# Patient Record
Sex: MALE | Race: WHITE | Employment: OTHER | URBAN - METROPOLITAN AREA
[De-identification: names, ages, dates, MRNs, and addresses within clinical notes are randomized per-mention and may not be internally consistent; named-entity substitution may affect disease eponyms.]

---

## 2017-05-12 PROCEDURE — 84153 ASSAY OF PSA TOTAL: CPT | Performed by: INTERNAL MEDICINE

## 2018-05-15 PROCEDURE — 84153 ASSAY OF PSA TOTAL: CPT | Performed by: INTERNAL MEDICINE

## 2019-10-06 ENCOUNTER — APPOINTMENT (OUTPATIENT)
Dept: GENERAL RADIOLOGY | Facility: HOSPITAL | Age: 84
DRG: 948 | End: 2019-10-06
Attending: EMERGENCY MEDICINE
Payer: MEDICARE

## 2019-10-06 ENCOUNTER — HOSPITAL ENCOUNTER (INPATIENT)
Facility: HOSPITAL | Age: 84
LOS: 3 days | Discharge: NURSING, REHAB OR LONG-TERM CARE | DRG: 948 | End: 2019-10-10
Attending: EMERGENCY MEDICINE | Admitting: INTERNAL MEDICINE
Payer: MEDICARE

## 2019-10-06 DIAGNOSIS — J18.9 COMMUNITY ACQUIRED PNEUMONIA OF LEFT LOWER LOBE OF LUNG: Primary | ICD-10-CM

## 2019-10-06 PROCEDURE — 80053 COMPREHEN METABOLIC PANEL: CPT | Performed by: EMERGENCY MEDICINE

## 2019-10-06 PROCEDURE — 81001 URINALYSIS AUTO W/SCOPE: CPT | Performed by: EMERGENCY MEDICINE

## 2019-10-06 PROCEDURE — 99285 EMERGENCY DEPT VISIT HI MDM: CPT

## 2019-10-06 PROCEDURE — 71045 X-RAY EXAM CHEST 1 VIEW: CPT | Performed by: EMERGENCY MEDICINE

## 2019-10-06 PROCEDURE — 87486 CHLMYD PNEUM DNA AMP PROBE: CPT | Performed by: EMERGENCY MEDICINE

## 2019-10-06 PROCEDURE — 84145 PROCALCITONIN (PCT): CPT | Performed by: HOSPITALIST

## 2019-10-06 PROCEDURE — 87633 RESP VIRUS 12-25 TARGETS: CPT | Performed by: EMERGENCY MEDICINE

## 2019-10-06 PROCEDURE — 36415 COLL VENOUS BLD VENIPUNCTURE: CPT

## 2019-10-06 PROCEDURE — 83605 ASSAY OF LACTIC ACID: CPT | Performed by: EMERGENCY MEDICINE

## 2019-10-06 PROCEDURE — 96361 HYDRATE IV INFUSION ADD-ON: CPT

## 2019-10-06 PROCEDURE — 93010 ELECTROCARDIOGRAM REPORT: CPT

## 2019-10-06 PROCEDURE — 85025 COMPLETE CBC W/AUTO DIFF WBC: CPT | Performed by: EMERGENCY MEDICINE

## 2019-10-06 PROCEDURE — 84450 TRANSFERASE (AST) (SGOT): CPT | Performed by: EMERGENCY MEDICINE

## 2019-10-06 PROCEDURE — 87581 M.PNEUMON DNA AMP PROBE: CPT | Performed by: EMERGENCY MEDICINE

## 2019-10-06 PROCEDURE — 87999 UNLISTED MICROBIOLOGY PX: CPT

## 2019-10-06 PROCEDURE — 93005 ELECTROCARDIOGRAM TRACING: CPT

## 2019-10-06 PROCEDURE — 84132 ASSAY OF SERUM POTASSIUM: CPT | Performed by: EMERGENCY MEDICINE

## 2019-10-06 PROCEDURE — 87798 DETECT AGENT NOS DNA AMP: CPT | Performed by: EMERGENCY MEDICINE

## 2019-10-06 PROCEDURE — 82962 GLUCOSE BLOOD TEST: CPT

## 2019-10-06 PROCEDURE — 96365 THER/PROPH/DIAG IV INF INIT: CPT

## 2019-10-06 PROCEDURE — 87040 BLOOD CULTURE FOR BACTERIA: CPT | Performed by: EMERGENCY MEDICINE

## 2019-10-06 RX ORDER — AZITHROMYCIN 250 MG/1
500 TABLET, FILM COATED ORAL ONCE
Status: COMPLETED | OUTPATIENT
Start: 2019-10-06 | End: 2019-10-07

## 2019-10-06 RX ORDER — ACETAMINOPHEN 500 MG
1000 TABLET ORAL ONCE
Status: COMPLETED | OUTPATIENT
Start: 2019-10-06 | End: 2019-10-06

## 2019-10-06 RX ORDER — CLONIDINE HYDROCHLORIDE 0.1 MG/1
0.1 TABLET ORAL ONCE
Status: COMPLETED | OUTPATIENT
Start: 2019-10-06 | End: 2019-10-06

## 2019-10-06 RX ORDER — SODIUM CHLORIDE 9 MG/ML
INJECTION, SOLUTION INTRAVENOUS CONTINUOUS
Status: DISCONTINUED | OUTPATIENT
Start: 2019-10-06 | End: 2019-10-08

## 2019-10-07 ENCOUNTER — APPOINTMENT (OUTPATIENT)
Dept: GENERAL RADIOLOGY | Facility: HOSPITAL | Age: 84
DRG: 948 | End: 2019-10-07
Attending: HOSPITALIST
Payer: MEDICARE

## 2019-10-07 PROCEDURE — 87081 CULTURE SCREEN ONLY: CPT | Performed by: HOSPITALIST

## 2019-10-07 PROCEDURE — 87430 STREP A AG IA: CPT | Performed by: HOSPITALIST

## 2019-10-07 PROCEDURE — 85025 COMPLETE CBC W/AUTO DIFF WBC: CPT | Performed by: INTERNAL MEDICINE

## 2019-10-07 PROCEDURE — 82607 VITAMIN B-12: CPT | Performed by: HOSPITALIST

## 2019-10-07 PROCEDURE — 71046 X-RAY EXAM CHEST 2 VIEWS: CPT | Performed by: HOSPITALIST

## 2019-10-07 PROCEDURE — 80048 BASIC METABOLIC PNL TOTAL CA: CPT | Performed by: INTERNAL MEDICINE

## 2019-10-07 PROCEDURE — 84145 PROCALCITONIN (PCT): CPT | Performed by: HOSPITALIST

## 2019-10-07 PROCEDURE — 84443 ASSAY THYROID STIM HORMONE: CPT | Performed by: HOSPITALIST

## 2019-10-07 PROCEDURE — S0028 INJECTION, FAMOTIDINE, 20 MG: HCPCS | Performed by: HOSPITALIST

## 2019-10-07 PROCEDURE — 84439 ASSAY OF FREE THYROXINE: CPT | Performed by: HOSPITALIST

## 2019-10-07 RX ORDER — ENOXAPARIN SODIUM 100 MG/ML
40 INJECTION SUBCUTANEOUS DAILY
Status: DISCONTINUED | OUTPATIENT
Start: 2019-10-07 | End: 2019-10-10

## 2019-10-07 RX ORDER — ACETAMINOPHEN 325 MG/1
650 TABLET ORAL EVERY 6 HOURS PRN
Status: DISCONTINUED | OUTPATIENT
Start: 2019-10-07 | End: 2019-10-10

## 2019-10-07 RX ORDER — ASPIRIN 81 MG/1
81 TABLET, CHEWABLE ORAL
Status: DISCONTINUED | OUTPATIENT
Start: 2019-10-07 | End: 2019-10-10

## 2019-10-07 RX ORDER — BENZONATATE 100 MG/1
100 CAPSULE ORAL 3 TIMES DAILY PRN
Status: DISCONTINUED | OUTPATIENT
Start: 2019-10-07 | End: 2019-10-10

## 2019-10-07 RX ORDER — MONTELUKAST SODIUM 10 MG/1
10 TABLET ORAL NIGHTLY
Status: DISCONTINUED | OUTPATIENT
Start: 2019-10-07 | End: 2019-10-10

## 2019-10-07 RX ORDER — SODIUM PHOSPHATE, DIBASIC AND SODIUM PHOSPHATE, MONOBASIC 7; 19 G/133ML; G/133ML
1 ENEMA RECTAL ONCE AS NEEDED
Status: DISCONTINUED | OUTPATIENT
Start: 2019-10-07 | End: 2019-10-10

## 2019-10-07 RX ORDER — SODIUM CHLORIDE 9 MG/ML
INJECTION, SOLUTION INTRAVENOUS CONTINUOUS
Status: DISCONTINUED | OUTPATIENT
Start: 2019-10-07 | End: 2019-10-08

## 2019-10-07 RX ORDER — DOCUSATE SODIUM 100 MG/1
100 CAPSULE, LIQUID FILLED ORAL 2 TIMES DAILY
Status: DISCONTINUED | OUTPATIENT
Start: 2019-10-07 | End: 2019-10-10

## 2019-10-07 RX ORDER — METHYLPREDNISOLONE SODIUM SUCCINATE 40 MG/ML
40 INJECTION, POWDER, LYOPHILIZED, FOR SOLUTION INTRAMUSCULAR; INTRAVENOUS ONCE
Status: COMPLETED | OUTPATIENT
Start: 2019-10-07 | End: 2019-10-07

## 2019-10-07 RX ORDER — TERAZOSIN 1 MG/1
1 CAPSULE ORAL NIGHTLY
Status: DISCONTINUED | OUTPATIENT
Start: 2019-10-07 | End: 2019-10-10

## 2019-10-07 RX ORDER — ONDANSETRON 2 MG/ML
4 INJECTION INTRAMUSCULAR; INTRAVENOUS EVERY 6 HOURS PRN
Status: DISCONTINUED | OUTPATIENT
Start: 2019-10-07 | End: 2019-10-10

## 2019-10-07 RX ORDER — HYDROCODONE BITARTRATE AND ACETAMINOPHEN 5; 325 MG/1; MG/1
1 TABLET ORAL EVERY 6 HOURS PRN
Status: DISCONTINUED | OUTPATIENT
Start: 2019-10-07 | End: 2019-10-10

## 2019-10-07 RX ORDER — FAMOTIDINE 10 MG/ML
20 INJECTION, SOLUTION INTRAVENOUS 2 TIMES DAILY
Status: DISCONTINUED | OUTPATIENT
Start: 2019-10-08 | End: 2019-10-10

## 2019-10-07 RX ORDER — BISACODYL 10 MG
10 SUPPOSITORY, RECTAL RECTAL
Status: DISCONTINUED | OUTPATIENT
Start: 2019-10-07 | End: 2019-10-10

## 2019-10-07 RX ORDER — METOCLOPRAMIDE HYDROCHLORIDE 5 MG/ML
10 INJECTION INTRAMUSCULAR; INTRAVENOUS EVERY 8 HOURS PRN
Status: DISCONTINUED | OUTPATIENT
Start: 2019-10-07 | End: 2019-10-10

## 2019-10-07 RX ORDER — FAMOTIDINE 10 MG/ML
20 INJECTION, SOLUTION INTRAVENOUS ONCE
Status: COMPLETED | OUTPATIENT
Start: 2019-10-07 | End: 2019-10-07

## 2019-10-07 RX ORDER — HYDRALAZINE HYDROCHLORIDE 20 MG/ML
10 INJECTION INTRAMUSCULAR; INTRAVENOUS EVERY 6 HOURS PRN
Status: DISCONTINUED | OUTPATIENT
Start: 2019-10-07 | End: 2019-10-10

## 2019-10-07 RX ORDER — POLYETHYLENE GLYCOL 3350 17 G/17G
17 POWDER, FOR SOLUTION ORAL DAILY PRN
Status: DISCONTINUED | OUTPATIENT
Start: 2019-10-07 | End: 2019-10-10

## 2019-10-07 RX ORDER — METHYLPREDNISOLONE SODIUM SUCCINATE 125 MG/2ML
60 INJECTION, POWDER, LYOPHILIZED, FOR SOLUTION INTRAMUSCULAR; INTRAVENOUS EVERY 8 HOURS
Status: COMPLETED | OUTPATIENT
Start: 2019-10-07 | End: 2019-10-08

## 2019-10-07 RX ORDER — CETIRIZINE HYDROCHLORIDE 10 MG/1
10 TABLET ORAL DAILY
Status: DISCONTINUED | OUTPATIENT
Start: 2019-10-07 | End: 2019-10-10

## 2019-10-07 NOTE — PLAN OF CARE
Pt alert & oriented to questioning & conversation, but per family a \"little off\"  Unsteady with ambulation. Hands are shaking, which is new per family. Pt has increasing cough throughout the day. Swollen uvula is improving slightly.  Will continue steroid

## 2019-10-07 NOTE — PROGRESS NOTES
NURSING ADMISSION NOTE      Patient admitted via Cart  Oriented to room. Safety precautions initiated. Bed in low position. Call light in reach. Pt arrived to room 417 from ED. Pt alert and oriented x3. Hypertensive and afebrile.  Denies pain or S

## 2019-10-07 NOTE — H&P
Addendum 2)  CXR PA/Lat with no evidence of consolidation. Small L pleural effusion. WBC unremarkable and no left shift. procalcitonin unremarkable. Rapid strep negative.  D/c abx for now    Reassess in AM      Addendum 1)  Called by RN that pt's janie wi HERNIA SURGERY     • OTHER SURGICAL HISTORY  7/1/11    placement of tumor markers-Dr. Kim Hernandez   • VASECTOMY          ALL:    Contrast Dye [Gadol*         Home Medications:    Outpatient Medications Marked as Taking for the 10/6/19 encounter [facility] non-tender. Bowel sounds normal. . Non distended, no overt hernias   Extremities: Extremities normal, atraumatic, no cyanosis or edema.    Skin: Skin color, texture, turgor normal. No visible rashes     Neurologic:  Psychiatric: No facial droop or dysarthri dilutional on top of AOCD, monitor. HDS    **prostate cancer, allergic rhinitis-stable, no acute issue, f/u outpt    **PPx  -scds, iain    D/w JADEN De Los Santos      previous hospital records reviewed.      Further recommendations pending patient's clinical co

## 2019-10-07 NOTE — CM/SW NOTE
MSW acknowledged order for [place] [id number] evaluation. MSW paged [facility]  P:[phone]  S:[phone] with referral.  They will meet with the patient and family at bedside to explain services, provide choice, and financial disclosure.     Vika Naylor

## 2019-10-07 NOTE — HOME CARE LIAISON
Received referral from [facility]. Met with pt at the bedside. Pt is agreeable to [facility] services at d/c. Brochure and contact info provided. Any pt questions addressed. Will follow.

## 2019-10-07 NOTE — PROGRESS NOTES
10/07/19 1505   Clinical Encounter Type   Visited With Patient and family together  (Patient and family created health care power of  document.)   Routine Visit Introduction   Continue Visiting No   Patient Spiritual Encounters   Spiritual Asses

## 2019-10-07 NOTE — ED PROVIDER NOTES
Patient Seen in: [facility] Emergency Department      History   Patient presents with:  Altered Mental Status (neurologic)    Stated Complaint: per daughter: lethargy, cough, unsteadiness, forgetfulness    HPI    49-year-old male here for cough the [10/06/19 1801]   BP (!) 189/94   Pulse 85   Resp 18   Temp (!) 101.3 °F (38.5 °C)   Temp src Temporal   SpO2 93 %   O2 Device None (Room air)       Current:/62 (BP Location: Right arm)   Pulse 75   Temp 98.7 °F (37.1 °C) (Oral)   Resp 18   Wt 75.1 k the following components:    RBC 3.50 (*)     HGB 11.4 (*)     HCT 34.4 (*)     .0 (*)     All other components within normal limits   LACTIC ACID, PLASMA - Normal   POTASSIUM - Normal   AST (SGOT) - Normal   PROCALCITONIN - Normal    Narrative: Chest Ap Portable  (cpt=71045)    Result Date: 10/6/2019  CONCLUSION:  No acute intrathoracic process is identified. Minimal pleural fluid versus pleural scarring at the left lung base laterally. Otherwise no acute process.     Dictated by: Dianelys Connor,

## 2019-10-08 PROCEDURE — 85014 HEMATOCRIT: CPT | Performed by: HOSPITALIST

## 2019-10-08 PROCEDURE — 97162 PT EVAL MOD COMPLEX 30 MIN: CPT

## 2019-10-08 PROCEDURE — S0028 INJECTION, FAMOTIDINE, 20 MG: HCPCS | Performed by: HOSPITALIST

## 2019-10-08 PROCEDURE — 82747 ASSAY OF FOLIC ACID RBC: CPT | Performed by: HOSPITALIST

## 2019-10-08 PROCEDURE — 97530 THERAPEUTIC ACTIVITIES: CPT

## 2019-10-08 RX ORDER — AMLODIPINE BESYLATE 5 MG/1
5 TABLET ORAL DAILY
Status: DISCONTINUED | OUTPATIENT
Start: 2019-10-08 | End: 2019-10-09

## 2019-10-08 RX ORDER — FLUTICASONE PROPIONATE 50 MCG
1 SPRAY, SUSPENSION (ML) NASAL DAILY
Status: DISCONTINUED | OUTPATIENT
Start: 2019-10-08 | End: 2019-10-10

## 2019-10-08 RX ORDER — PREDNISONE 20 MG/1
20 TABLET ORAL
Status: DISCONTINUED | OUTPATIENT
Start: 2019-10-09 | End: 2019-10-10

## 2019-10-08 NOTE — CM/SW NOTE
PT recommending TOMI. Met w/pt and pt's daughter regarding this. Daughter agreeable but pt still deciding on TOMI. Gave daughter TOMI list to look over. She stated she will tour facilities tomorrow.

## 2019-10-08 NOTE — PROGRESS NOTES
[facility] Hospitalist Progress Note     [facility]      SUBJECTIVE:  No CP, SOB, or N/V.   Strength and confusion improving    OBJECTIVE:  Temp:  [97.5 °F (36.4 °C)-98.7 °F (37.1 °C)] 97.5 °F (36.4 °C)  Pulse:  [75-76] 76  Resp:  [18] 18  BP: (118-170)/(48 cardiac silhouette. MEDIASTINUM:  Normal. PLEURA:  Slight elevation of the left hemidiaphragm especially laterally could represent a small amount of pleural fluid. BONES:  Moderate degenerative changes of the spine.       CONCLUSION:  Small left pleural eff infusion  Intravenous Continuous   Enoxaparin Sodium (LOVENOX) 40 MG/0.4ML injection 40 mg 40 mg Subcutaneous Daily   ondansetron HCl (ZOFRAN) injection 4 mg 4 mg Intravenous Q6H PRN   Metoclopramide HCl (REGLAN) injection 10 mg 10 mg Intravenous Q8H PRN PT/OT    Dispo: admitted for confusion/weakness, plans for TOMI. Discussed with daughter at bedside, will likely be medically ready for d/c tomorrow. Questions/concerns were discussed with patient and/or family by bedside.     [facility]  Internal Med

## 2019-10-08 NOTE — PHYSICAL THERAPY NOTE
PHYSICAL THERAPY EVALUATION - INPATIENT     Room Number: 417/417-A  Evaluation Date: 10/8/2019  Type of Evaluation: Initial  Physician Order: PT Eval and Treat    Presenting Problem: pneumonia  Reason for Therapy: Mobility Dysfunction and Discharge P PAIN ASSESSMENT  Ratin          COGNITION  · Memory:  impaired working memory  · Following Commands:  follows one step commands with repetition  · Safety Judgement:  decreased awareness of need for assistance and decreased awareness of aware of deficits. Delgado balance performed as below. Pt gait trained with RW x50ft with CGA and improved ivette and safety. Pt in agreement with using at this time. Increased time spent discussing dc recs with pt's dtr after session.       Modified Luna Renee comorbidities manifest themselves as functional limitations in independent bed mobility, transfers, and gait.   The patient is below baseline and would benefit from skilled inpatient PT to address the above deficits to assist patient in returning to prior t

## 2019-10-08 NOTE — PLAN OF CARE
Pt A/O x4. VSS. Afebrile. No c/o pain. IVF stopped. Able to wean oxygen to room air, sating at 94%. Ambulatory in halls with PT. PT recommending TOMI. Norvasc started for high BP. Flonase started for post nasal drip. Daughter at bedside.  Pt and family upda

## 2019-10-08 NOTE — PLAN OF CARE
A&O times 3, forgetful at times. Denies any pain. Family at bedside. 0.9 NS infusing. IV steroids continued.   Up with assist.      Problem: PAIN - ADULT  Goal: Verbalizes/displays adequate comfort level or patient's stated pain goal  Description  INTER rate  - No straws  - Encourage small bites of food and small sips of liquid  - Offer pills one at a time, crush or deliver with applesauce as needed  - Discontinue feeding and notify MD (or speech pathologist) if coughing or persistent throat clearing or w

## 2019-10-09 PROCEDURE — 80048 BASIC METABOLIC PNL TOTAL CA: CPT | Performed by: INTERNAL MEDICINE

## 2019-10-09 PROCEDURE — 97535 SELF CARE MNGMENT TRAINING: CPT

## 2019-10-09 PROCEDURE — S0028 INJECTION, FAMOTIDINE, 20 MG: HCPCS | Performed by: HOSPITALIST

## 2019-10-09 PROCEDURE — 97165 OT EVAL LOW COMPLEX 30 MIN: CPT

## 2019-10-09 RX ORDER — AMLODIPINE BESYLATE 5 MG/1
10 TABLET ORAL DAILY
Status: DISCONTINUED | OUTPATIENT
Start: 2019-10-09 | End: 2019-10-10

## 2019-10-09 NOTE — OCCUPATIONAL THERAPY NOTE
OCCUPATIONAL THERAPY QUICK EVALUATION - INPATIENT    Room Number: 417/417-A  Evaluation Date: 10/9/2019     Type of Evaluation: Quick Eval  Presenting Problem: Community acquired Pneumonia    Physician Order: IP Consult to Occupational Therapy  Reason for stronger    OBJECTIVE  Precautions: Bed/chair alarm  Fall Risk: High fall risk    WEIGHT BEARING RESTRICTION  Weight Bearing Restriction: None                PAIN ASSESSMENT  Ratin  Location: n/a       COGNITION  WFL    RANGE OF MOTION AND STRENGTH ASS instructed in LB dressing nino/doffing socks at MOD I and pants simulated MOD Iwhile seated and standing. Pt returned to semi-reclined position in arm bed with all needs met, call light within reach, RN aware of session.      Patient End of Session: In bed; level

## 2019-10-09 NOTE — PROGRESS NOTES
Alert,oriented. No complaint of pain. Assisted to ambulate to bathroom. Resting comfortably at this time.

## 2019-10-09 NOTE — PROGRESS NOTES
[facility] Hospitalist Progress Note     [facility]      SUBJECTIVE:  Feeling better  No acute events    OBJECTIVE:  Temp:  [98.2 °F (36.8 °C)-98.7 °F (37.1 °C)] 98.7 °F (37.1 °C)  Pulse:  [65-78] 78  Resp:  [18] 18  BP: (119-177)/(58-77) 119/58    Intake/ CARDIAC:  Normal size cardiac silhouette. MEDIASTINUM:  Normal. PLEURA:  Slight elevation of the left hemidiaphragm especially laterally could represent a small amount of pleural fluid. BONES:  Moderate degenerative changes of the spine.       CONCLUSION: hydroxide (MILK OF MAGNESIA) 400 MG/5ML suspension 30 mL 30 mL Oral Daily PRN   bisacodyl (DULCOLAX) rectal suppository 10 mg 10 mg Rectal Daily PRN   FLEET ENEMA (FLEET) 7-19 GM/118ML enema 133 mL 1 enema Rectal Once PRN   Enoxaparin Sodium (LOVENOX) 40 M lovenox  Deconditioning prevention: PT/OT    Dispo: d/c to [facility] when bed available    Questions/concerns were discussed with patient and/or family by bedside.     Josephine Roldan  Internal Medicine  [facility]

## 2019-10-09 NOTE — PLAN OF CARE
Pt a/o x4. VSS. No complaints of pain. PO steroids started today. Norvasc dose increased d/t high BP. Family chose [facility] in [place]. Possibility of d/c tomorrow. Family at bedside. Pt and family updated on POC. Will continue to monitor.

## 2019-10-10 ENCOUNTER — EXTERNAL FACILITY (OUTPATIENT)
Dept: FAMILY MEDICINE CLINIC | Facility: CLINIC | Age: 84
End: 2019-10-10

## 2019-10-10 VITALS
BODY MASS INDEX: 33 KG/M2 | RESPIRATION RATE: 18 BRPM | SYSTOLIC BLOOD PRESSURE: 144 MMHG | WEIGHT: 165.63 LBS | DIASTOLIC BLOOD PRESSURE: 74 MMHG | HEART RATE: 59 BPM | OXYGEN SATURATION: 98 % | TEMPERATURE: 98 F

## 2019-10-10 DIAGNOSIS — Z85.46 HISTORY OF PROSTATE CANCER: ICD-10-CM

## 2019-10-10 DIAGNOSIS — R53.1 GENERALIZED WEAKNESS: ICD-10-CM

## 2019-10-10 DIAGNOSIS — I10 ESSENTIAL HYPERTENSION: ICD-10-CM

## 2019-10-10 DIAGNOSIS — R22.1 SWOLLEN UVULA: ICD-10-CM

## 2019-10-10 DIAGNOSIS — D63.8 ANEMIA, CHRONIC DISEASE: ICD-10-CM

## 2019-10-10 DIAGNOSIS — J18.9 COMMUNITY ACQUIRED PNEUMONIA OF LEFT LOWER LOBE OF LUNG: Primary | ICD-10-CM

## 2019-10-10 PROCEDURE — 90471 IMMUNIZATION ADMIN: CPT

## 2019-10-10 PROCEDURE — S0028 INJECTION, FAMOTIDINE, 20 MG: HCPCS | Performed by: HOSPITALIST

## 2019-10-10 PROCEDURE — 80048 BASIC METABOLIC PNL TOTAL CA: CPT | Performed by: INTERNAL MEDICINE

## 2019-10-10 PROCEDURE — 99306 1ST NF CARE HIGH MDM 50: CPT | Performed by: FAMILY MEDICINE

## 2019-10-10 RX ORDER — HYDROCODONE BITARTRATE AND ACETAMINOPHEN 5; 325 MG/1; MG/1
1 TABLET ORAL EVERY 6 HOURS PRN
Qty: 20 TABLET | Refills: 0 | Status: SHIPPED | OUTPATIENT
Start: 2019-10-10 | End: 2020-01-28

## 2019-10-10 RX ORDER — CETIRIZINE HYDROCHLORIDE 10 MG/1
10 TABLET ORAL DAILY
Qty: 30 TABLET | Refills: 0 | Status: SHIPPED | OUTPATIENT
Start: 2019-10-11 | End: 2019-10-25

## 2019-10-10 RX ORDER — FAMOTIDINE 20 MG/1
20 TABLET ORAL 2 TIMES DAILY
Status: DISCONTINUED | OUTPATIENT
Start: 2019-10-10 | End: 2019-10-10

## 2019-10-10 RX ORDER — PREDNISONE 20 MG/1
20 TABLET ORAL
Qty: 2 TABLET | Refills: 0 | Status: SHIPPED | OUTPATIENT
Start: 2019-10-11 | End: 2019-10-13

## 2019-10-10 RX ORDER — AMLODIPINE BESYLATE 10 MG/1
10 TABLET ORAL DAILY
Qty: 30 TABLET | Refills: 0 | Status: SHIPPED | OUTPATIENT
Start: 2019-10-11 | End: 2019-10-25

## 2019-10-10 RX ORDER — FLUTICASONE PROPIONATE 50 MCG
1 SPRAY, SUSPENSION (ML) NASAL DAILY
Qty: 1 BOTTLE | Refills: 0 | Status: SHIPPED | OUTPATIENT
Start: 2019-10-11 | End: 2019-10-25 | Stop reason: ALTCHOICE

## 2019-10-10 NOTE — DISCHARGE SUMMARY
General Medicine Discharge Summary     Patient ID:  Emperatriz Salas  80year old  12/30/1931    Admit date: 10/6/2019    Discharge date and time 10/10/19  Attending Physician: Cornelius Mcqueen DO CONSULT TO SOCIAL WORK  IP CONSULT TO RESPIRATORY CARE  IP CONSULT TO SPIRITUAL CARE  IP CONSULT TO PHYSICAL THERAPY  IP CONSULT TO SOCIAL WORK  IP CONSULT TO OCCUPATIONAL THERAPY    Operative Procedures:        Patient instructions:      Current Discharge

## 2019-10-10 NOTE — PLAN OF CARE
Alert and oriented, follows command with no confusion. Denies any pain. States he is getting stronger. Ambulated to the bathroom several times today with one assist, activity was tolerated well.  Has good appetite, had a BM today, denies any pain with urina Activities of Daily Living  Goal: Achieve highest/safest level of independence in self care  Description  Interventions:  - Assess ability and encourage patient to participate in ADLs to maximize function  - Promote sitting position while performing ADLs s

## 2019-10-10 NOTE — PROGRESS NOTES
Patient was restless this shift,unable to sleep. Assisted to the bathroom several times,Norco 5 mg x1 given,able to sleep. Resting comfortably at this time.

## 2019-10-10 NOTE — PROGRESS NOTES
[facility] Pharmacy Note: Route Optimization for Famotidine (PEPCID)    Patient is currently on Famotidine (PEPCID) 20 mg IV every 12 hours.    The patient meets the criteria to convert to the oral equivalent as established by the IV to Oral conversion protocol ap

## 2019-10-10 NOTE — CM/SW NOTE
10/10/19 1300   Discharge disposition   Expected discharge disposition Skilled Nurs   Name of Facillity/Home Care/Hospice [facility]   Discharge transportation Private car   [place] aware of dc. RN also aware.  Family to transport the pt to [facility].

## 2019-10-13 NOTE — PROGRESS NOTES
Kristina Diver Author: Sondra Zapata MD     1931 MRN [id number]   [facility]  Admission 10/6/19      [facility]        CC --admitted to [facility] History    Tobacco Use      Smoking status: Former Smoker        Types: Pipe        Quit date: 1964        Years since quittin.8      Smokeless tobacco: Never Used    Alcohol use: No    Drug use: No      ALLERGIES:    Contrast Dye [Gadol*        C lesions  HEENT: atraumatic, normocephalic,  Oral cavity, mild swelling of the uvula  EYES: PERRLA, EOMI, conjunctiva are clear  NECK: supple, no adenopathy, no bruits  CHEST: no chest tenderness  LUNGS: clear to auscultation  CARDIO: RRR without murmur  GI

## 2019-10-16 ENCOUNTER — INITIAL APN SNF VISIT (OUTPATIENT)
Dept: INTERNAL MEDICINE CLINIC | Age: 84
End: 2019-10-16

## 2019-10-16 DIAGNOSIS — R52 PAIN: ICD-10-CM

## 2019-10-16 DIAGNOSIS — I10 ESSENTIAL HYPERTENSION: Primary | ICD-10-CM

## 2019-10-16 DIAGNOSIS — D64.9 ACUTE ON CHRONIC ANEMIA: ICD-10-CM

## 2019-10-16 DIAGNOSIS — H61.23 BILATERAL IMPACTED CERUMEN: ICD-10-CM

## 2019-10-16 DIAGNOSIS — R53.1 GENERALIZED WEAKNESS: ICD-10-CM

## 2019-10-16 DIAGNOSIS — C61 PROSTATE CANCER (HCC): ICD-10-CM

## 2019-10-16 DIAGNOSIS — J18.9 COMMUNITY ACQUIRED PNEUMONIA OF LEFT LOWER LOBE OF LUNG: ICD-10-CM

## 2019-10-16 PROCEDURE — 99304 1ST NF CARE SF/LOW MDM 25: CPT | Performed by: NURSE PRACTITIONER

## 2019-10-17 VITALS
TEMPERATURE: 98 F | DIASTOLIC BLOOD PRESSURE: 65 MMHG | RESPIRATION RATE: 17 BRPM | SYSTOLIC BLOOD PRESSURE: 114 MMHG | HEART RATE: 88 BPM

## 2019-10-17 RX ORDER — ACETAMINOPHEN 325 MG/1
325 TABLET ORAL EVERY 6 HOURS PRN
COMMUNITY
End: 2019-10-25

## 2019-10-18 ENCOUNTER — SNF VISIT (OUTPATIENT)
Dept: INTERNAL MEDICINE CLINIC | Age: 84
End: 2019-10-18

## 2019-10-18 DIAGNOSIS — A04.72 C. DIFFICILE DIARRHEA: ICD-10-CM

## 2019-10-18 DIAGNOSIS — J18.9 COMMUNITY ACQUIRED PNEUMONIA OF LEFT LOWER LOBE OF LUNG: ICD-10-CM

## 2019-10-18 DIAGNOSIS — R53.1 GENERALIZED WEAKNESS: ICD-10-CM

## 2019-10-18 DIAGNOSIS — H61.23 BILATERAL IMPACTED CERUMEN: ICD-10-CM

## 2019-10-18 DIAGNOSIS — R05.9 COUGH: Primary | ICD-10-CM

## 2019-10-18 PROCEDURE — 99309 SBSQ NF CARE MODERATE MDM 30: CPT | Performed by: NURSE PRACTITIONER

## 2019-10-18 NOTE — PROGRESS NOTES
Lady Barfield  : 1931  Age 80year old  male patient is admitted to Facility: [facility] [place] [facility].     [address] [place] [address] [address] date:  10/6/19  Discharge date to [facility]:  10/10/19  ELOS:  12-14 day Types: Pipe        Quit date: 1964        Years since quittin.8      Smokeless tobacco: Never Used    Alcohol use: No    Drug use: No      ALLERGIES:    Contrast Dye [Gadol*        CODE STATUS:  Full Code    ADVANCED CARE PLANNING TEAM: None rectal bleeding; no heartburn  :no dysuria, urgency or frequency; no epididymal or testicular pain; no penile discharge; no hernias; no nocturia: no hematuria  MUSCULOSKELETAL:---generalized weakness  NEURO:no sensory or motor complaint, denies seizures, w/Diff:10/15/19  WBC=6.77  RBC=3.67  Hgb=11.8  Hct=36.3  MCV=99.7  MCH=32.4  MCHC=32.5  RDW-CV=13.3  Platelet UU=492  KGW=23.8    Vitamin D=29.3    Magnesium=2.0    CMP:10/15/19  Glucose=82  BUN=17  Cr=0.83  Bilirubin=0.47  Protein=5.5  Albumin=3.1  Sodium

## 2019-10-18 NOTE — CDS QUERY
Uncertain Diagnosis  CLINICAL DOCUMENTATION CLARIFICATION FORM  Dear Doctor Fe Aguilar,   Clinical information (provided below) indicates an unknown status of a documented diagnosis.  For accurate ICD-10-CM code assignment to reflect severity of illness and

## 2019-10-19 VITALS
HEART RATE: 68 BPM | TEMPERATURE: 98 F | SYSTOLIC BLOOD PRESSURE: 135 MMHG | DIASTOLIC BLOOD PRESSURE: 69 MMHG | RESPIRATION RATE: 18 BRPM

## 2019-10-19 RX ORDER — GUAIFENESIN 100 MG/5ML
5 SOLUTION ORAL EVERY 4 HOURS PRN
COMMUNITY
End: 2019-10-25 | Stop reason: ALTCHOICE

## 2019-10-19 RX ORDER — ERGOCALCIFEROL (VITAMIN D2) 1250 MCG
50000 CAPSULE ORAL WEEKLY
COMMUNITY
End: 2019-10-21

## 2019-10-19 NOTE — PROGRESS NOTES
[facility], 12/30/1931, 80year old, male    Chief Complaint:  Patient presents with:   Follow - Up: Positive for C Diff, Cough       Subjective:  79 y/o male [place] with PMHx of HTN, Prostate Cancer, allergic rhinitis was found to have confusion and weakness.   Weakness R/T recent hospitalization/diagnoses/sequelae; will undergo therapies to rehab and improve strength, endurance and independence w/ ADLs  EXTREMITIES/VASCULAR:no cyanosis, clubbing or edema, radial pulses 2+ and dorsalis pedal pulses 2+

## 2019-10-21 ENCOUNTER — SNF DISCHARGE (OUTPATIENT)
Dept: INTERNAL MEDICINE CLINIC | Age: 84
End: 2019-10-21

## 2019-10-21 VITALS
OXYGEN SATURATION: 95 % | RESPIRATION RATE: 16 BRPM | TEMPERATURE: 99 F | SYSTOLIC BLOOD PRESSURE: 161 MMHG | HEART RATE: 87 BPM | DIASTOLIC BLOOD PRESSURE: 74 MMHG

## 2019-10-21 DIAGNOSIS — R05.9 COUGH: Primary | ICD-10-CM

## 2019-10-21 DIAGNOSIS — R53.1 GENERALIZED WEAKNESS: ICD-10-CM

## 2019-10-21 DIAGNOSIS — J18.9 COMMUNITY ACQUIRED PNEUMONIA OF LEFT LOWER LOBE OF LUNG: ICD-10-CM

## 2019-10-21 DIAGNOSIS — A04.72 C. DIFFICILE DIARRHEA: ICD-10-CM

## 2019-10-21 PROCEDURE — 99316 NF DSCHRG MGMT 30 MIN+: CPT | Performed by: NURSE PRACTITIONER

## 2019-10-21 NOTE — PROGRESS NOTES
[facility] Floor, 12/30/1931, 80year old, male is being discharged from Facility: [address]    Date of Admission:10/10/19     Date of Anticipated Discharge:  10/23/19                             Admitting Diagn bruits  BREAST: ---deferred  RESPIRATORY:clear to percussion and auscultation;+wet cough, no fevers, no chills.   CARDIOVASCULAR: S1, S2 normal, RRR; no S3, no S4; , no click, no murmur  ABDOMEN:  normal active BS+, soft, nondistended; no organomegaly, no m reflexes normal, cranial nerves intact II-XII, follows commands  PSYCHIATRIC: alert and oriented x 3; affect appropriate      Skilled Nursing Facility Course:    · Progressing well with PT/OT. · Developed a cough, CXR=negative, no fevers, no chills.   Will qd     Vitamin D Deficiency  -Vitamin D 1000 units qd     Labs  -CXR=Negative  -Per PCP     Follow Up Appointments  -Dr. Corine Clayton.  Nacho Kidney, PCP in 7 to 10 days after discharge from [facility].     Medication Reconciliation Completed:  Yes    Greater than 30 min

## 2019-10-21 NOTE — ADDENDUM NOTE
Addended by: Saqib Philippe on: 10/21/2019 04:08 PM     Modules accepted: Orders Patient presents to ER complaining of abdominal pain X2 days, reports nausea and vomiting, not able to eat, denies urinary symptoms,  no diarrhea, resp even/unlabored, lungs CTAB.

## 2020-01-04 ENCOUNTER — HOSPITAL ENCOUNTER (OUTPATIENT)
Age: 85
Discharge: HOME | End: 2020-01-04
Payer: MEDICARE

## 2020-01-04 VITALS
RESPIRATION RATE: 20 BRPM | OXYGEN SATURATION: 95 % | TEMPERATURE: 98 F | SYSTOLIC BLOOD PRESSURE: 155 MMHG | DIASTOLIC BLOOD PRESSURE: 87 MMHG | HEART RATE: 83 BPM

## 2020-01-04 DIAGNOSIS — H10.33 ACUTE BACTERIAL CONJUNCTIVITIS OF BOTH EYES: Primary | ICD-10-CM

## 2020-01-04 PROCEDURE — 99213 OFFICE O/P EST LOW 20 MIN: CPT

## 2020-01-04 RX ORDER — CIPROFLOXACIN HYDROCHLORIDE 3.5 MG/ML
2 SOLUTION/ DROPS TOPICAL
Qty: 1 BOTTLE | Refills: 1 | Status: SHIPPED | OUTPATIENT
Start: 2020-01-04 | End: 2020-01-06

## 2020-01-04 NOTE — ED PROVIDER NOTES
Patient Seen in: [facility]      History   Patient presents with:  Eye Problem    Stated Complaint: Eye Irritation     HPI    12-year-old male who comes in today with his son complaining of bilateral eye discharge and drainage t Temp 97.8 °F (36.6 °C)   Temp src Temporal   SpO2 95 %   O2 Device None (Room air)       Current:/87   Pulse 83   Temp 97.8 °F (36.6 °C) (Temporal)   Resp 20   SpO2 95%     Right Eye Chart Acuity: 20/50, Corrected  Left Eye Chart Acuity: 20/50, Cor [id number]  CAnca Brooks, [place] [address]  [phone]    Schedule an appointment as soon as possible for a visit in 3 days  If symptoms worsen or have not improved        Medications Prescribed:  Disch

## 2020-02-07 ENCOUNTER — HOSPITAL ENCOUNTER (OUTPATIENT)
Facility: HOSPITAL | Age: 85
Setting detail: OBSERVATION
LOS: 1 days | Discharge: HOME | End: 2020-02-08
Attending: EMERGENCY MEDICINE | Admitting: INTERNAL MEDICINE
Payer: MEDICARE

## 2020-02-07 ENCOUNTER — APPOINTMENT (OUTPATIENT)
Dept: MRI IMAGING | Facility: HOSPITAL | Age: 85
End: 2020-02-07
Attending: HOSPITALIST
Payer: MEDICARE

## 2020-02-07 ENCOUNTER — APPOINTMENT (OUTPATIENT)
Dept: CT IMAGING | Facility: HOSPITAL | Age: 85
End: 2020-02-07
Attending: EMERGENCY MEDICINE
Payer: MEDICARE

## 2020-02-07 DIAGNOSIS — M54.42 ACUTE LEFT-SIDED LOW BACK PAIN WITH BILATERAL SCIATICA: Primary | ICD-10-CM

## 2020-02-07 DIAGNOSIS — M54.41 ACUTE LEFT-SIDED LOW BACK PAIN WITH BILATERAL SCIATICA: Primary | ICD-10-CM

## 2020-02-07 LAB
ANION GAP SERPL CALC-SCNC: 5 MMOL/L (ref 0–18)
BASOPHILS # BLD AUTO: 0.02 X10(3) UL (ref 0–0.2)
BASOPHILS NFR BLD AUTO: 0.3 %
BILIRUB UR QL STRIP.AUTO: NEGATIVE
BUN BLD-MCNC: 34 MG/DL (ref 7–18)
BUN/CREAT SERPL: 45.9 (ref 10–20)
CALCIUM BLD-MCNC: 8.4 MG/DL (ref 8.5–10.1)
CHLORIDE SERPL-SCNC: 110 MMOL/L (ref 98–112)
CLARITY UR REFRACT.AUTO: CLEAR
CO2 SERPL-SCNC: 28 MMOL/L (ref 21–32)
COLOR UR AUTO: YELLOW
CREAT BLD-MCNC: 0.74 MG/DL (ref 0.7–1.3)
DEPRECATED RDW RBC AUTO: 47.9 FL (ref 35.1–46.3)
EOSINOPHIL # BLD AUTO: 0.09 X10(3) UL (ref 0–0.7)
EOSINOPHIL NFR BLD AUTO: 1.3 %
ERYTHROCYTE [DISTWIDTH] IN BLOOD BY AUTOMATED COUNT: 13 % (ref 11–15)
GLUCOSE BLD-MCNC: 107 MG/DL (ref 70–99)
GLUCOSE UR STRIP.AUTO-MCNC: NEGATIVE MG/DL
HCT VFR BLD AUTO: 36.7 % (ref 39–53)
HGB BLD-MCNC: 12 G/DL (ref 13–17.5)
IMM GRANULOCYTES # BLD AUTO: 0.02 X10(3) UL (ref 0–1)
IMM GRANULOCYTES NFR BLD: 0.3 %
KETONES UR STRIP.AUTO-MCNC: NEGATIVE MG/DL
LEUKOCYTE ESTERASE UR QL STRIP.AUTO: NEGATIVE
LYMPHOCYTES # BLD AUTO: 1.44 X10(3) UL (ref 1–4)
LYMPHOCYTES NFR BLD AUTO: 20.4 %
MCH RBC QN AUTO: 32.8 PG (ref 26–34)
MCHC RBC AUTO-ENTMCNC: 32.7 G/DL (ref 31–37)
MCV RBC AUTO: 100.3 FL (ref 80–100)
MONOCYTES # BLD AUTO: 0.8 X10(3) UL (ref 0.1–1)
MONOCYTES NFR BLD AUTO: 11.3 %
NEUTROPHILS # BLD AUTO: 4.69 X10 (3) UL (ref 1.5–7.7)
NEUTROPHILS # BLD AUTO: 4.69 X10(3) UL (ref 1.5–7.7)
NEUTROPHILS NFR BLD AUTO: 66.4 %
NITRITE UR QL STRIP.AUTO: NEGATIVE
OSMOLALITY SERPL CALC.SUM OF ELEC: 304 MOSM/KG (ref 275–295)
PH UR STRIP.AUTO: 6 [PH] (ref 4.5–8)
PLATELET # BLD AUTO: 155 10(3)UL (ref 150–450)
POTASSIUM SERPL-SCNC: 4 MMOL/L (ref 3.5–5.1)
PROT UR STRIP.AUTO-MCNC: NEGATIVE MG/DL
RBC # BLD AUTO: 3.66 X10(6)UL (ref 3.8–5.8)
RBC UR QL AUTO: NEGATIVE
SODIUM SERPL-SCNC: 143 MMOL/L (ref 136–145)
SP GR UR STRIP.AUTO: 1.02 (ref 1–1.03)
UROBILINOGEN UR STRIP.AUTO-MCNC: <2 MG/DL
WBC # BLD AUTO: 7.1 X10(3) UL (ref 4–11)

## 2020-02-07 PROCEDURE — 72131 CT LUMBAR SPINE W/O DYE: CPT | Performed by: EMERGENCY MEDICINE

## 2020-02-07 PROCEDURE — 99285 EMERGENCY DEPT VISIT HI MDM: CPT

## 2020-02-07 PROCEDURE — 85025 COMPLETE CBC W/AUTO DIFF WBC: CPT | Performed by: EMERGENCY MEDICINE

## 2020-02-07 PROCEDURE — 80048 BASIC METABOLIC PNL TOTAL CA: CPT | Performed by: EMERGENCY MEDICINE

## 2020-02-07 PROCEDURE — 81001 URINALYSIS AUTO W/SCOPE: CPT | Performed by: EMERGENCY MEDICINE

## 2020-02-07 PROCEDURE — 96374 THER/PROPH/DIAG INJ IV PUSH: CPT

## 2020-02-07 PROCEDURE — 96375 TX/PRO/DX INJ NEW DRUG ADDON: CPT

## 2020-02-07 PROCEDURE — 72148 MRI LUMBAR SPINE W/O DYE: CPT | Performed by: HOSPITALIST

## 2020-02-07 PROCEDURE — 96372 THER/PROPH/DIAG INJ SC/IM: CPT

## 2020-02-07 RX ORDER — METHYLPREDNISOLONE 4 MG/1
4 TABLET ORAL
Status: DISCONTINUED | OUTPATIENT
Start: 2020-02-08 | End: 2020-02-08

## 2020-02-07 RX ORDER — HYDROCODONE BITARTRATE AND ACETAMINOPHEN 5; 325 MG/1; MG/1
1 TABLET ORAL EVERY 4 HOURS PRN
Status: DISCONTINUED | OUTPATIENT
Start: 2020-02-07 | End: 2020-02-08

## 2020-02-07 RX ORDER — METHYLPREDNISOLONE 4 MG/1
4 TABLET ORAL
Status: COMPLETED | OUTPATIENT
Start: 2020-02-07 | End: 2020-02-07

## 2020-02-07 RX ORDER — ACETAMINOPHEN 325 MG/1
650 TABLET ORAL EVERY 4 HOURS PRN
Status: DISCONTINUED | OUTPATIENT
Start: 2020-02-07 | End: 2020-02-08

## 2020-02-07 RX ORDER — MONTELUKAST SODIUM 10 MG/1
10 TABLET ORAL NIGHTLY
Status: DISCONTINUED | OUTPATIENT
Start: 2020-02-07 | End: 2020-02-08

## 2020-02-07 RX ORDER — LORATADINE 10 MG/1
10 TABLET ORAL DAILY
COMMUNITY

## 2020-02-07 RX ORDER — MORPHINE SULFATE 4 MG/ML
1 INJECTION, SOLUTION INTRAMUSCULAR; INTRAVENOUS EVERY 2 HOUR PRN
Status: DISCONTINUED | OUTPATIENT
Start: 2020-02-07 | End: 2020-02-08

## 2020-02-07 RX ORDER — TERAZOSIN 10 MG/1
10 CAPSULE ORAL NIGHTLY
Status: DISCONTINUED | OUTPATIENT
Start: 2020-02-07 | End: 2020-02-08

## 2020-02-07 RX ORDER — ACETAMINOPHEN 325 MG/1
650 TABLET ORAL EVERY 6 HOURS PRN
Status: DISCONTINUED | OUTPATIENT
Start: 2020-02-07 | End: 2020-02-08

## 2020-02-07 RX ORDER — METHYLPREDNISOLONE 4 MG/1
8 TABLET ORAL
Status: DISCONTINUED | OUTPATIENT
Start: 2020-02-08 | End: 2020-02-08

## 2020-02-07 RX ORDER — MORPHINE SULFATE 4 MG/ML
2 INJECTION, SOLUTION INTRAMUSCULAR; INTRAVENOUS ONCE
Status: COMPLETED | OUTPATIENT
Start: 2020-02-07 | End: 2020-02-07

## 2020-02-07 RX ORDER — DOCUSATE SODIUM 100 MG/1
100 CAPSULE, LIQUID FILLED ORAL 2 TIMES DAILY
Status: DISCONTINUED | OUTPATIENT
Start: 2020-02-07 | End: 2020-02-08

## 2020-02-07 RX ORDER — METHYLPREDNISOLONE 4 MG/1
8 TABLET ORAL
Status: COMPLETED | OUTPATIENT
Start: 2020-02-07 | End: 2020-02-07

## 2020-02-07 RX ORDER — CETIRIZINE HYDROCHLORIDE 10 MG/1
10 TABLET ORAL DAILY
Status: DISCONTINUED | OUTPATIENT
Start: 2020-02-07 | End: 2020-02-08

## 2020-02-07 RX ORDER — ASPIRIN 81 MG/1
81 TABLET ORAL DAILY
COMMUNITY

## 2020-02-07 RX ORDER — ASPIRIN 81 MG/1
81 TABLET ORAL DAILY
Status: DISCONTINUED | OUTPATIENT
Start: 2020-02-07 | End: 2020-02-08

## 2020-02-07 RX ORDER — MORPHINE SULFATE 4 MG/ML
2 INJECTION, SOLUTION INTRAMUSCULAR; INTRAVENOUS EVERY 2 HOUR PRN
Status: DISCONTINUED | OUTPATIENT
Start: 2020-02-07 | End: 2020-02-08

## 2020-02-07 RX ORDER — METHYLPREDNISOLONE 4 MG/1
4 TABLET ORAL 2 TIMES DAILY
Status: DISCONTINUED | OUTPATIENT
Start: 2020-02-11 | End: 2020-02-08

## 2020-02-07 RX ORDER — ONDANSETRON 2 MG/ML
4 INJECTION INTRAMUSCULAR; INTRAVENOUS ONCE
Status: COMPLETED | OUTPATIENT
Start: 2020-02-07 | End: 2020-02-07

## 2020-02-07 RX ORDER — DOXAZOSIN 8 MG/1
8 TABLET ORAL DAILY
COMMUNITY
End: 2021-05-25

## 2020-02-07 RX ORDER — METHYLPREDNISOLONE 4 MG/1
4 TABLET ORAL
Status: DISCONTINUED | OUTPATIENT
Start: 2020-02-10 | End: 2020-02-08

## 2020-02-07 RX ORDER — POLYETHYLENE GLYCOL 3350 17 G/17G
17 POWDER, FOR SOLUTION ORAL DAILY PRN
Status: DISCONTINUED | OUTPATIENT
Start: 2020-02-07 | End: 2020-02-08

## 2020-02-07 RX ORDER — METHYLPREDNISOLONE 4 MG/1
4 TABLET ORAL
Status: DISCONTINUED | OUTPATIENT
Start: 2020-02-12 | End: 2020-02-08

## 2020-02-07 RX ORDER — METHYLPREDNISOLONE 4 MG/1
4 TABLET ORAL
Status: DISCONTINUED | OUTPATIENT
Start: 2020-02-07 | End: 2020-02-08

## 2020-02-07 RX ORDER — MONTELUKAST SODIUM 10 MG/1
10 TABLET ORAL NIGHTLY
COMMUNITY
End: 2021-02-22

## 2020-02-07 RX ORDER — SODIUM PHOSPHATE, DIBASIC AND SODIUM PHOSPHATE, MONOBASIC 7; 19 G/133ML; G/133ML
1 ENEMA RECTAL ONCE AS NEEDED
Status: DISCONTINUED | OUTPATIENT
Start: 2020-02-07 | End: 2020-02-08

## 2020-02-07 RX ORDER — ENOXAPARIN SODIUM 100 MG/ML
40 INJECTION SUBCUTANEOUS DAILY
Status: DISCONTINUED | OUTPATIENT
Start: 2020-02-07 | End: 2020-02-08

## 2020-02-07 RX ORDER — MORPHINE SULFATE 4 MG/ML
4 INJECTION, SOLUTION INTRAMUSCULAR; INTRAVENOUS EVERY 2 HOUR PRN
Status: DISCONTINUED | OUTPATIENT
Start: 2020-02-07 | End: 2020-02-08

## 2020-02-07 RX ORDER — HYDROCODONE BITARTRATE AND ACETAMINOPHEN 5; 325 MG/1; MG/1
2 TABLET ORAL EVERY 4 HOURS PRN
Status: DISCONTINUED | OUTPATIENT
Start: 2020-02-07 | End: 2020-02-08

## 2020-02-07 RX ORDER — METHYLPREDNISOLONE 4 MG/1
4 TABLET ORAL
Status: DISCONTINUED | OUTPATIENT
Start: 2020-02-09 | End: 2020-02-08

## 2020-02-07 RX ORDER — HYDROCODONE BITARTRATE AND ACETAMINOPHEN 5; 325 MG/1; MG/1
1 TABLET ORAL EVERY 6 HOURS PRN
Status: DISCONTINUED | OUTPATIENT
Start: 2020-02-07 | End: 2020-02-07

## 2020-02-07 RX ORDER — BISACODYL 10 MG
10 SUPPOSITORY, RECTAL RECTAL
Status: DISCONTINUED | OUTPATIENT
Start: 2020-02-07 | End: 2020-02-08

## 2020-02-07 RX ORDER — HYDROCODONE BITARTRATE AND ACETAMINOPHEN 5; 325 MG/1; MG/1
2 TABLET ORAL ONCE
Status: COMPLETED | OUTPATIENT
Start: 2020-02-07 | End: 2020-02-07

## 2020-02-07 NOTE — PROGRESS NOTES
Pt arrived to unit. Denies pain at this time, norco last given in ER 0600. Stand-pivot transfer to bed with staff. Per family and patient no falls at home, however patient has been weak on his feet and had a near miss prior to admission.  4/5 strength to BL

## 2020-02-07 NOTE — ED PROVIDER NOTES
Patient Seen in: [facility] Emergency Department      History   Patient presents with:  Back Pain    Stated Complaint: back pain/general weakness    HPI   79 yo male pmh of chronic back pain and arthritis now presents ED with complaints of low back Abdominal:      General: Bowel sounds are normal.      Palpations: Abdomen is soft.    Musculoskeletal:      Comments: No midline CTL spine tenderness palpation step-offs deformities left ischial spine tenderness palpation no deformities no ecchymosis   S 10 image 25. This suggest the possibility of acute or subacute osteophyte fracture. Thus fat infiltration could also be seen in the setting of early inflammatory arthropathy which could be infectious or noninfectious.   Can consider MRI for further assess diagnosis)    Disposition:  Admit  2/7/2020  3:51 am    Follow-up:  No follow-up provider specified.       Medications Prescribed:  Current Discharge Medication List                   Present on Admission  Date Reviewed: 1/28/2020          ICD-10-CM Noted P

## 2020-02-07 NOTE — PLAN OF CARE
Pain controlled with PO norco, continue to assess for adequate pain control throughout shift. PT/OT ordered to evaluate, will most likely see pt tomorrow am. Per MD, it is ok for PT/OT to see patient prior to MRI.   MRI ordered to evaluate spine, will be co

## 2020-02-07 NOTE — CM/SW NOTE
Patient failed inpatient criteria. Second level of review completed and supports observation. UR committee in agreement. Discussed with Dr. Dora Sosa  who approves observation status. MOON given to the patient and order written.

## 2020-02-07 NOTE — H&P
.  CC: Patient presents with:  Back Pain       PCP: Katie Aguirre MD    History of Present Illness: Patient is a 80year old male with PMH sig for h/o chronic back pain with h/o laminectomy in 1968, takes 1 norco a day but moves around independently daily., Disp: , Rfl:   Wound Dressings ([employer] HYDROCOLLOID 2\"X2\" EX), Apply topically every other day., Disp: , Rfl:   aspirin 81 MG Oral Tab EC, Take 81 mg by mouth daily. , Disp: , Rfl:   Doxazosin Mesylate 8 MG Oral Tab, Take 8 mg by mouth daily.  30 Jenny 02/07/20  0328   WBC 7.1   HGB 12.0*   .3*   .0       Recent Labs   Lab 02/07/20 0328      K 4.0      CO2 28.0   BUN 34*   CREATSERUM 0.74   *   CA 8.4*       No results for input(s): ALT, AST, ALB, AMYLASE, LIPASE, LDH in bowel/bladder incontinence, no other neuro sx  - unsure of significance of possible right L1 osteophyte fracture (unclear age)- pt was having discomfort extending towards R hip.  Otherwise does have significant degenerative changes and spinal stenosis diffu

## 2020-02-07 NOTE — CM/SW NOTE
COND 44: Patient failed Inpatient criteria. Second level of review completed and supports Observation. UR committee in agreement. Discussed with Dr Camila Dale approves.

## 2020-02-07 NOTE — PROGRESS NOTES
Patient returned to room for MRI. Results available in Epic, Dr Niyah Nixon updated to review MRI results.

## 2020-02-08 VITALS
OXYGEN SATURATION: 96 % | HEART RATE: 68 BPM | RESPIRATION RATE: 18 BRPM | BODY MASS INDEX: 33 KG/M2 | DIASTOLIC BLOOD PRESSURE: 64 MMHG | SYSTOLIC BLOOD PRESSURE: 140 MMHG | HEIGHT: 60 IN | TEMPERATURE: 98 F

## 2020-02-08 LAB
ANION GAP SERPL CALC-SCNC: 6 MMOL/L (ref 0–18)
BASOPHILS # BLD AUTO: 0.02 X10(3) UL (ref 0–0.2)
BASOPHILS NFR BLD AUTO: 0.3 %
BUN BLD-MCNC: 23 MG/DL (ref 7–18)
BUN/CREAT SERPL: 31.5 (ref 10–20)
CALCIUM BLD-MCNC: 8.1 MG/DL (ref 8.5–10.1)
CHLORIDE SERPL-SCNC: 108 MMOL/L (ref 98–112)
CO2 SERPL-SCNC: 27 MMOL/L (ref 21–32)
CREAT BLD-MCNC: 0.73 MG/DL (ref 0.7–1.3)
DEPRECATED RDW RBC AUTO: 48.4 FL (ref 35.1–46.3)
EOSINOPHIL # BLD AUTO: 0.01 X10(3) UL (ref 0–0.7)
EOSINOPHIL NFR BLD AUTO: 0.1 %
ERYTHROCYTE [DISTWIDTH] IN BLOOD BY AUTOMATED COUNT: 12.9 % (ref 11–15)
GLUCOSE BLD-MCNC: 111 MG/DL (ref 70–99)
HCT VFR BLD AUTO: 36.8 % (ref 39–53)
HGB BLD-MCNC: 11.9 G/DL (ref 13–17.5)
IMM GRANULOCYTES # BLD AUTO: 0.03 X10(3) UL (ref 0–1)
IMM GRANULOCYTES NFR BLD: 0.4 %
LYMPHOCYTES # BLD AUTO: 0.96 X10(3) UL (ref 1–4)
LYMPHOCYTES NFR BLD AUTO: 12 %
MCH RBC QN AUTO: 32.3 PG (ref 26–34)
MCHC RBC AUTO-ENTMCNC: 32.3 G/DL (ref 31–37)
MCV RBC AUTO: 100 FL (ref 80–100)
MONOCYTES # BLD AUTO: 0.57 X10(3) UL (ref 0.1–1)
MONOCYTES NFR BLD AUTO: 7.1 %
NEUTROPHILS # BLD AUTO: 6.4 X10 (3) UL (ref 1.5–7.7)
NEUTROPHILS # BLD AUTO: 6.4 X10(3) UL (ref 1.5–7.7)
NEUTROPHILS NFR BLD AUTO: 80.1 %
OSMOLALITY SERPL CALC.SUM OF ELEC: 296 MOSM/KG (ref 275–295)
PLATELET # BLD AUTO: 171 10(3)UL (ref 150–450)
POTASSIUM SERPL-SCNC: 4.2 MMOL/L (ref 3.5–5.1)
RBC # BLD AUTO: 3.68 X10(6)UL (ref 3.8–5.8)
SODIUM SERPL-SCNC: 141 MMOL/L (ref 136–145)
WBC # BLD AUTO: 8 X10(3) UL (ref 4–11)

## 2020-02-08 PROCEDURE — 97535 SELF CARE MNGMENT TRAINING: CPT

## 2020-02-08 PROCEDURE — 97161 PT EVAL LOW COMPLEX 20 MIN: CPT

## 2020-02-08 PROCEDURE — 96372 THER/PROPH/DIAG INJ SC/IM: CPT

## 2020-02-08 PROCEDURE — 96376 TX/PRO/DX INJ SAME DRUG ADON: CPT

## 2020-02-08 PROCEDURE — 85025 COMPLETE CBC W/AUTO DIFF WBC: CPT | Performed by: INTERNAL MEDICINE

## 2020-02-08 PROCEDURE — 80048 BASIC METABOLIC PNL TOTAL CA: CPT | Performed by: INTERNAL MEDICINE

## 2020-02-08 RX ORDER — METHYLPREDNISOLONE 4 MG/1
TABLET ORAL
Qty: 21 TABLET | Refills: 0 | Status: SHIPPED | OUTPATIENT
Start: 2020-02-08 | End: 2021-04-29 | Stop reason: ALTCHOICE

## 2020-02-08 NOTE — PLAN OF CARE
Discharge paperwork reviewed in detail with son in law and patient, both verbalized understanding. Patient also instructed on how to schedule outpatient physical therapy. Patient awaiting transport.

## 2020-02-08 NOTE — PHYSICAL THERAPY NOTE
PHYSICAL THERAPY QUICK EVALUATION - INPATIENT    Room Number: 1111/1744-P  Evaluation Date: 2/8/2020  Presenting Problem: back pain  Physician Order: PT Eval and Treat    Problem List  Principal Problem:    Acute left-sided low back pain with bilateral s ASSESSMENT  Ratin  Location: (back R hip at end of session)  Management Techniques: Activity promotion; Body mechanics;Breathing techniques;Relaxation;Repositioning   RANGE OF MOTION AND STRENGTH ASSESSMENT  Upper extremity ROM and strength are within f son-in-law verbalized understanding    Patient End of Session: Up in chair;Needs met;Call light within reach;RN aware of session/findings; All patient questions and concerns addressed; Family present    ASSESSMENT   Patient is a 80year old male admitted on

## 2020-02-08 NOTE — PROGRESS NOTES
[facility] Hospitalist Progress Note      Reap Patient Status:  Observation    1931 MRN [id number]   [facility] 0SW-A Attending David Simmons MD   [facility] Day # 1 PCP Nolvia Paz MD     CC: follow up    SUBJECTIVE: 2/10/2020] methylPREDNISolone  4 mg Oral Daily with breakfast   • [START ON 2/10/2020] methylPREDNISolone  4 mg Oral Daily with lunch   • [START ON 2/10/2020] methylPREDNISolone  4 mg Oral nightly   • [START ON 2/11/2020] methylPREDNISolone  4 mg Oral BID care, and/or d/w staff.      Jaki Hanks MD   [facility] IM Hospitalist  Pager: [phone]

## 2021-04-29 PROBLEM — M54.42 ACUTE LEFT-SIDED LOW BACK PAIN WITH BILATERAL SCIATICA: Status: RESOLVED | Noted: 2020-02-07 | Resolved: 2021-04-29

## 2021-04-29 PROBLEM — K13.79 OTHER LESIONS OF ORAL MUCOSA: Status: RESOLVED | Noted: 2019-10-10 | Resolved: 2021-04-29

## 2021-04-29 PROBLEM — K57.90 DIVERTICULOSIS OF INTESTINE, PART UNSPECIFIED, WITHOUT PERFORATION OR ABSCESS WITHOUT BLEEDING: Status: ACTIVE | Noted: 2019-10-10

## 2021-04-29 PROBLEM — M54.41 ACUTE LEFT-SIDED LOW BACK PAIN WITH BILATERAL SCIATICA: Status: RESOLVED | Noted: 2020-02-07 | Resolved: 2021-04-29

## 2021-04-29 PROBLEM — I34.9 NONRHEUMATIC MITRAL VALVE DISORDER, UNSPECIFIED: Status: ACTIVE | Noted: 2019-10-10

## 2021-04-29 PROBLEM — M75.82 OTHER SHOULDER LESIONS, LEFT SHOULDER: Status: ACTIVE | Noted: 2019-10-10

## 2021-04-29 PROBLEM — R48.9 UNSPECIFIED SYMBOLIC DYSFUNCTIONS: Status: RESOLVED | Noted: 2019-10-11 | Resolved: 2021-04-29

## 2021-04-29 PROBLEM — M62.58 MUSCLE WASTING AND ATROPHY, NOT ELSEWHERE CLASSIFIED, OTHER SITE: Status: ACTIVE | Noted: 2019-10-11

## 2021-04-29 PROBLEM — K13.79 OTHER LESIONS OF ORAL MUCOSA: Status: ACTIVE | Noted: 2019-10-10

## 2021-04-29 PROBLEM — R13.11 DYSPHAGIA, ORAL PHASE: Status: ACTIVE | Noted: 2019-10-11

## 2021-04-29 PROBLEM — A04.72 ENTEROCOLITIS DUE TO CLOSTRIDIUM DIFFICILE, NOT SPECIFIED AS RECURRENT: Status: ACTIVE | Noted: 2019-10-17

## 2021-04-29 PROBLEM — J18.9 COMMUNITY ACQUIRED PNEUMONIA OF LEFT LOWER LOBE OF LUNG: Status: RESOLVED | Noted: 2019-10-06 | Resolved: 2021-04-29

## 2021-04-29 PROBLEM — R26.9 UNSPECIFIED ABNORMALITIES OF GAIT AND MOBILITY: Status: ACTIVE | Noted: 2019-10-11

## 2021-04-29 PROBLEM — R48.9 UNSPECIFIED SYMBOLIC DYSFUNCTIONS: Status: ACTIVE | Noted: 2019-10-11

## 2021-04-29 PROBLEM — E66.9 OBESITY, UNSPECIFIED: Status: ACTIVE | Noted: 2019-10-10

## 2021-04-29 PROBLEM — E66.9 OBESITY, UNSPECIFIED: Status: RESOLVED | Noted: 2019-10-10 | Resolved: 2021-04-29

## 2021-09-01 NOTE — ED NOTES
RN report given by Margarette Pierre, patient is sleeping, appears calm, even bilateral non-labored breathing. Additional Notes: Patient consent was obtained to proceed with the visit and recommended plan of care after discussion of all risks and benefits, including the risks of COVID-19 exposure. Detail Level: Simple

## 2021-11-07 ENCOUNTER — HOSPITAL ENCOUNTER (EMERGENCY)
Facility: HOSPITAL | Age: 86
Discharge: HOME | End: 2021-11-07
Attending: EMERGENCY MEDICINE
Payer: MEDICARE

## 2021-11-07 ENCOUNTER — APPOINTMENT (OUTPATIENT)
Dept: GENERAL RADIOLOGY | Facility: HOSPITAL | Age: 86
End: 2021-11-07
Attending: EMERGENCY MEDICINE
Payer: MEDICARE

## 2021-11-07 ENCOUNTER — APPOINTMENT (OUTPATIENT)
Dept: CT IMAGING | Facility: HOSPITAL | Age: 86
End: 2021-11-07
Attending: EMERGENCY MEDICINE
Payer: MEDICARE

## 2021-11-07 VITALS
SYSTOLIC BLOOD PRESSURE: 150 MMHG | TEMPERATURE: 99 F | HEIGHT: 62 IN | BODY MASS INDEX: 29.81 KG/M2 | WEIGHT: 162 LBS | RESPIRATION RATE: 19 BRPM | DIASTOLIC BLOOD PRESSURE: 81 MMHG | OXYGEN SATURATION: 100 % | HEART RATE: 70 BPM

## 2021-11-07 DIAGNOSIS — J18.9 PNEUMONIA OF LEFT LOWER LOBE DUE TO INFECTIOUS ORGANISM: Primary | ICD-10-CM

## 2021-11-07 PROCEDURE — 99285 EMERGENCY DEPT VISIT HI MDM: CPT | Performed by: EMERGENCY MEDICINE

## 2021-11-07 PROCEDURE — 96360 HYDRATION IV INFUSION INIT: CPT | Performed by: EMERGENCY MEDICINE

## 2021-11-07 PROCEDURE — 96361 HYDRATE IV INFUSION ADD-ON: CPT | Performed by: EMERGENCY MEDICINE

## 2021-11-07 PROCEDURE — 71045 X-RAY EXAM CHEST 1 VIEW: CPT | Performed by: EMERGENCY MEDICINE

## 2021-11-07 PROCEDURE — 70450 CT HEAD/BRAIN W/O DYE: CPT | Performed by: EMERGENCY MEDICINE

## 2021-11-07 PROCEDURE — 71275 CT ANGIOGRAPHY CHEST: CPT | Performed by: EMERGENCY MEDICINE

## 2021-11-07 PROCEDURE — 85025 COMPLETE CBC W/AUTO DIFF WBC: CPT | Performed by: EMERGENCY MEDICINE

## 2021-11-07 PROCEDURE — 80053 COMPREHEN METABOLIC PANEL: CPT | Performed by: EMERGENCY MEDICINE

## 2021-11-07 RX ORDER — AZITHROMYCIN 250 MG/1
TABLET, FILM COATED ORAL
Qty: 6 TABLET | Refills: 0 | Status: SHIPPED | OUTPATIENT
Start: 2021-11-07 | End: 2021-11-12

## 2021-11-07 RX ORDER — SODIUM CHLORIDE 9 MG/ML
INJECTION, SOLUTION INTRAVENOUS CONTINUOUS
Status: DISCONTINUED | OUTPATIENT
Start: 2021-11-07 | End: 2021-11-07

## 2021-11-07 RX ORDER — AMOXICILLIN AND CLAVULANATE POTASSIUM 875; 125 MG/1; MG/1
1 TABLET, FILM COATED ORAL 2 TIMES DAILY
Qty: 20 TABLET | Refills: 0 | Status: SHIPPED | OUTPATIENT
Start: 2021-11-07 | End: 2021-11-17

## 2021-11-07 NOTE — ED INITIAL ASSESSMENT (HPI)
Patient presents from EMS for evaluation of several episodes of being difficult to wake up. EMS states daughter called when she tried to wake him up and she was not able to wake him up and reported shallow breathing.  Patient states he fell asleep in the re

## 2021-11-07 NOTE — ED PROVIDER NOTES
Patient Seen in: [facility] Emergency Department      History   Patient presents with:  Altered Mental Status    Stated Complaint: ams, now resolved    Subjective:   HPI    75-year-old male with a history of hypertension, chronic neck/back pain who VASECTOMY                  Social History    Tobacco Use      Smoking status: Former Smoker        Packs/day: 0.00        Types: Pipe        Quit date: 1964        Years since quittin.8      Smokeless tobacco: Never Used    Vaping Use      Vaping Narrative: The following orders were created for panel order CBC With Differential With Platelet.   Procedure                               Abnormality         Status                     ---------                               -----------         ------ 11/07/2021 at 4:54 PM       CT ANGIOGRAPHY, CHEST (CPT=71275)    Result Date: 11/7/2021  PROCEDURE:  CT ANGIOGRAPHY, CHEST (CPT=71275)  COMPARISON:  MARC[facility] CT, CHEST W+W/O CONTRAST, 12/12/2006, 10:02 AM.  INDICATIONS:  ams, now resolved  TECHNIQUE: obtained.   COMPARISON:  [facility] , XR, XR CHEST PA + LAT CHEST (CPT=71046), 10/07/2019, 12:50 PM.  INDICATIONS:  ams, now resolved  PATIENT STATED HISTORY: (As transcribed by Technologist)  Patient offered no additional history at this time    FINDINGS:  Sma days., Normal, Disp-20 tablet, R-0    azithromycin (ZITHROMAX Z-NANCY) 250 MG Oral Tab  500 mg once followed by 250 mg daily x 4 days, Normal, Disp-6 tablet, R-0

## 2022-01-10 ENCOUNTER — HOSPITAL ENCOUNTER (INPATIENT)
Facility: HOSPITAL | Age: 87
LOS: 2 days | Discharge: HOME | DRG: 177 | End: 2022-01-12
Attending: EMERGENCY MEDICINE | Admitting: INTERNAL MEDICINE
Payer: MEDICARE

## 2022-01-10 ENCOUNTER — APPOINTMENT (OUTPATIENT)
Dept: GENERAL RADIOLOGY | Facility: HOSPITAL | Age: 87
DRG: 177 | End: 2022-01-10
Payer: MEDICARE

## 2022-01-10 DIAGNOSIS — U07.1 PNEUMONIA DUE TO COVID-19 VIRUS: Primary | ICD-10-CM

## 2022-01-10 DIAGNOSIS — J12.82 PNEUMONIA DUE TO COVID-19 VIRUS: Primary | ICD-10-CM

## 2022-01-10 PROBLEM — D64.9 ANEMIA: Status: ACTIVE | Noted: 2022-01-10

## 2022-01-10 PROBLEM — R79.89 AZOTEMIA: Status: ACTIVE | Noted: 2022-01-10

## 2022-01-10 PROBLEM — D69.6 THROMBOCYTOPENIA (HCC): Status: ACTIVE | Noted: 2022-01-10

## 2022-01-10 LAB
ALBUMIN SERPL-MCNC: 2.8 G/DL (ref 3.4–5)
ALBUMIN/GLOB SERPL: 0.7 {RATIO} (ref 1–2)
ALP LIVER SERPL-CCNC: 66 U/L
ALT SERPL-CCNC: 15 U/L
ANION GAP SERPL CALC-SCNC: 5 MMOL/L (ref 0–18)
AST SERPL-CCNC: 21 U/L (ref 15–37)
BASOPHILS # BLD AUTO: 0 X10(3) UL (ref 0–0.2)
BASOPHILS NFR BLD AUTO: 0 %
BILIRUB SERPL-MCNC: 0.4 MG/DL (ref 0.1–2)
BUN BLD-MCNC: 24 MG/DL (ref 7–18)
CALCIUM BLD-MCNC: 7.9 MG/DL (ref 8.5–10.1)
CHLORIDE SERPL-SCNC: 109 MMOL/L (ref 98–112)
CK SERPL-CCNC: 199 U/L
CO2 SERPL-SCNC: 27 MMOL/L (ref 21–32)
CREAT BLD-MCNC: 0.88 MG/DL
CRP SERPL-MCNC: 8.54 MG/DL (ref ?–0.3)
D DIMER PPP FEU-MCNC: <0.27 UG/ML FEU (ref ?–0.9)
DEPRECATED HBV CORE AB SER IA-ACNC: 254 NG/ML
EOSINOPHIL # BLD AUTO: 0 X10(3) UL (ref 0–0.7)
EOSINOPHIL NFR BLD AUTO: 0 %
ERYTHROCYTE [DISTWIDTH] IN BLOOD BY AUTOMATED COUNT: 12.8 %
GLOBULIN PLAS-MCNC: 3.8 G/DL (ref 2.8–4.4)
GLUCOSE BLD-MCNC: 98 MG/DL (ref 70–99)
HCT VFR BLD AUTO: 33.8 %
HGB BLD-MCNC: 11.4 G/DL
IMM GRANULOCYTES # BLD AUTO: 0.02 X10(3) UL (ref 0–1)
IMM GRANULOCYTES NFR BLD: 0.3 %
LDH SERPL L TO P-CCNC: 240 U/L
LYMPHOCYTES # BLD AUTO: 0.62 X10(3) UL (ref 1–4)
LYMPHOCYTES NFR BLD AUTO: 10.2 %
MCH RBC QN AUTO: 33.3 PG (ref 26–34)
MCHC RBC AUTO-ENTMCNC: 33.7 G/DL (ref 31–37)
MCV RBC AUTO: 98.8 FL
MONOCYTES # BLD AUTO: 0.55 X10(3) UL (ref 0.1–1)
MONOCYTES NFR BLD AUTO: 9.1 %
NEUTROPHILS # BLD AUTO: 4.87 X10 (3) UL (ref 1.5–7.7)
NEUTROPHILS # BLD AUTO: 4.87 X10(3) UL (ref 1.5–7.7)
NEUTROPHILS NFR BLD AUTO: 80.4 %
NT-PROBNP SERPL-MCNC: 539 PG/ML (ref ?–450)
OSMOLALITY SERPL CALC.SUM OF ELEC: 296 MOSM/KG (ref 275–295)
PLATELET # BLD AUTO: 123 10(3)UL (ref 150–450)
POTASSIUM SERPL-SCNC: 3.6 MMOL/L (ref 3.5–5.1)
PROCALCITONIN SERPL-MCNC: 0.05 NG/ML (ref ?–0.16)
PROT SERPL-MCNC: 6.6 G/DL (ref 6.4–8.2)
RBC # BLD AUTO: 3.42 X10(6)UL
SODIUM SERPL-SCNC: 141 MMOL/L (ref 136–145)
TROPONIN I HIGH SENSITIVITY: 34 NG/L
TROPONIN I HIGH SENSITIVITY: 36 NG/L
TROPONIN I HIGH SENSITIVITY: 37 NG/L
WBC # BLD AUTO: 6.1 X10(3) UL (ref 4–11)

## 2022-01-10 PROCEDURE — 85025 COMPLETE CBC W/AUTO DIFF WBC: CPT | Performed by: EMERGENCY MEDICINE

## 2022-01-10 PROCEDURE — 82728 ASSAY OF FERRITIN: CPT | Performed by: EMERGENCY MEDICINE

## 2022-01-10 PROCEDURE — 87040 BLOOD CULTURE FOR BACTERIA: CPT | Performed by: EMERGENCY MEDICINE

## 2022-01-10 PROCEDURE — 71045 X-RAY EXAM CHEST 1 VIEW: CPT

## 2022-01-10 PROCEDURE — XW033E5 INTRODUCTION OF REMDESIVIR ANTI-INFECTIVE INTO PERIPHERAL VEIN, PERCUTANEOUS APPROACH, NEW TECHNOLOGY GROUP 5: ICD-10-PCS | Performed by: EMERGENCY MEDICINE

## 2022-01-10 PROCEDURE — 36415 COLL VENOUS BLD VENIPUNCTURE: CPT

## 2022-01-10 PROCEDURE — 84484 ASSAY OF TROPONIN QUANT: CPT | Performed by: EMERGENCY MEDICINE

## 2022-01-10 PROCEDURE — 84145 PROCALCITONIN (PCT): CPT | Performed by: EMERGENCY MEDICINE

## 2022-01-10 PROCEDURE — XW033F5 INTRODUCTION OF OTHER NEW TECHNOLOGY THERAPEUTIC SUBSTANCE INTO PERIPHERAL VEIN, PERCUTANEOUS APPROACH, NEW TECHNOLOGY GROUP 5: ICD-10-PCS | Performed by: EMERGENCY MEDICINE

## 2022-01-10 PROCEDURE — 96365 THER/PROPH/DIAG IV INF INIT: CPT

## 2022-01-10 PROCEDURE — 96366 THER/PROPH/DIAG IV INF ADDON: CPT

## 2022-01-10 PROCEDURE — 99285 EMERGENCY DEPT VISIT HI MDM: CPT

## 2022-01-10 PROCEDURE — 83615 LACTATE (LD) (LDH) ENZYME: CPT | Performed by: EMERGENCY MEDICINE

## 2022-01-10 PROCEDURE — 94640 AIRWAY INHALATION TREATMENT: CPT

## 2022-01-10 PROCEDURE — 80053 COMPREHEN METABOLIC PANEL: CPT | Performed by: EMERGENCY MEDICINE

## 2022-01-10 PROCEDURE — 83880 ASSAY OF NATRIURETIC PEPTIDE: CPT | Performed by: EMERGENCY MEDICINE

## 2022-01-10 PROCEDURE — 96375 TX/PRO/DX INJ NEW DRUG ADDON: CPT

## 2022-01-10 PROCEDURE — 86140 C-REACTIVE PROTEIN: CPT | Performed by: EMERGENCY MEDICINE

## 2022-01-10 PROCEDURE — 82550 ASSAY OF CK (CPK): CPT | Performed by: EMERGENCY MEDICINE

## 2022-01-10 PROCEDURE — 85379 FIBRIN DEGRADATION QUANT: CPT | Performed by: EMERGENCY MEDICINE

## 2022-01-10 RX ORDER — TERAZOSIN 10 MG/1
10 CAPSULE ORAL NIGHTLY
Status: DISCONTINUED | OUTPATIENT
Start: 2022-01-10 | End: 2022-01-12

## 2022-01-10 RX ORDER — GUAIFENESIN 600 MG
600 TABLET, EXTENDED RELEASE 12 HR ORAL 2 TIMES DAILY
Status: DISCONTINUED | OUTPATIENT
Start: 2022-01-10 | End: 2022-01-12

## 2022-01-10 RX ORDER — ALBUTEROL SULFATE 90 UG/1
8 AEROSOL, METERED RESPIRATORY (INHALATION) 4 TIMES DAILY
Status: DISCONTINUED | OUTPATIENT
Start: 2022-01-10 | End: 2022-01-12

## 2022-01-10 RX ORDER — ALBUTEROL SULFATE 90 UG/1
4 AEROSOL, METERED RESPIRATORY (INHALATION) EVERY 4 HOURS PRN
Status: DISCONTINUED | OUTPATIENT
Start: 2022-01-10 | End: 2022-01-12

## 2022-01-10 RX ORDER — LISINOPRIL 20 MG/1
20 TABLET ORAL DAILY
Status: DISCONTINUED | OUTPATIENT
Start: 2022-01-10 | End: 2022-01-12

## 2022-01-10 RX ORDER — METOPROLOL SUCCINATE 25 MG/1
25 TABLET, EXTENDED RELEASE ORAL
Status: DISCONTINUED | OUTPATIENT
Start: 2022-01-11 | End: 2022-01-12

## 2022-01-10 RX ORDER — DEXAMETHASONE SODIUM PHOSPHATE 10 MG/ML
6 INJECTION, SOLUTION INTRAMUSCULAR; INTRAVENOUS DAILY
Status: DISCONTINUED | OUTPATIENT
Start: 2022-01-11 | End: 2022-01-10

## 2022-01-10 RX ORDER — ASPIRIN 81 MG/1
81 TABLET ORAL DAILY
Status: DISCONTINUED | OUTPATIENT
Start: 2022-01-10 | End: 2022-01-12

## 2022-01-10 RX ORDER — BENZONATATE 200 MG/1
200 CAPSULE ORAL 3 TIMES DAILY PRN
Status: DISCONTINUED | OUTPATIENT
Start: 2022-01-10 | End: 2022-01-12

## 2022-01-10 RX ORDER — DEXAMETHASONE SODIUM PHOSPHATE 4 MG/ML
6 VIAL (ML) INJECTION EVERY 24 HOURS
Status: DISCONTINUED | OUTPATIENT
Start: 2022-01-11 | End: 2022-01-12

## 2022-01-10 RX ORDER — ACETAMINOPHEN 325 MG/1
650 TABLET ORAL EVERY 6 HOURS PRN
Status: DISCONTINUED | OUTPATIENT
Start: 2022-01-10 | End: 2022-01-12

## 2022-01-10 RX ORDER — HYDROCODONE BITARTRATE AND ACETAMINOPHEN 5; 325 MG/1; MG/1
1 TABLET ORAL EVERY 6 HOURS PRN
Status: DISCONTINUED | OUTPATIENT
Start: 2022-01-10 | End: 2022-01-12

## 2022-01-10 RX ORDER — MONTELUKAST SODIUM 10 MG/1
10 TABLET ORAL EVERY EVENING
Status: DISCONTINUED | OUTPATIENT
Start: 2022-01-10 | End: 2022-01-12

## 2022-01-10 RX ORDER — CHOLECALCIFEROL (VITAMIN D3) 125 MCG
2000 CAPSULE ORAL DAILY
Status: DISCONTINUED | OUTPATIENT
Start: 2022-01-10 | End: 2022-01-12

## 2022-01-10 RX ORDER — DEXAMETHASONE SODIUM PHOSPHATE 10 MG/ML
10 INJECTION, SOLUTION INTRAMUSCULAR; INTRAVENOUS ONCE
Status: COMPLETED | OUTPATIENT
Start: 2022-01-10 | End: 2022-01-10

## 2022-01-10 NOTE — ED QUICK NOTES
Pt 90% on room air at rest. MD aware. Plan now for admission. IV established and labs obtained.  Will update pt's daughter at pt request.

## 2022-01-10 NOTE — H&P
General Medicine H&P     Patient presents with:  Cough/URI  Covid       PCP: Nereida Macdonald MD    History of Present Illness: Patient is a 80year old male with PMH including but not limited to HTN, prostate ca, CAD who p/t 1404 Located within [facility] ED c cough and covid. No       Fam Hx  Family History   Problem Relation Age of Onset   • Cancer Father    • Other ([other]) Mother    • Heart Attack Brother        Review of Systems  Comprehensive ROS reviewed and negative except for what's stated above.  \    OBJECTIVE:  BP 13 INDICATIONS:  cough, congetion for about a week; no other symptoms  PATIENT STATED HISTORY: (As transcribed by Technologist)  Patient offered no additional history at this time. CONCLUSION:  Mild cardiac enlargement.   Pulmonary vascularity is

## 2022-01-10 NOTE — ED QUICK NOTES
Orders for admission, patient is aware of plan and ready to go upstairs. Any questions, please call ED RN gui at extension [phone].      Patient Covid vaccination status: Fully vaccinated     COVID Test Ordered in ED: None    COVID Suspicion at Admission: N

## 2022-01-11 LAB
ALBUMIN SERPL-MCNC: 2.7 G/DL (ref 3.4–5)
ALBUMIN/GLOB SERPL: 0.9 {RATIO} (ref 1–2)
ALP LIVER SERPL-CCNC: 64 U/L
ALT SERPL-CCNC: 15 U/L
ANION GAP SERPL CALC-SCNC: 6 MMOL/L (ref 0–18)
AST SERPL-CCNC: 19 U/L (ref 15–37)
BASOPHILS # BLD AUTO: 0 X10(3) UL (ref 0–0.2)
BASOPHILS NFR BLD AUTO: 0 %
BILIRUB SERPL-MCNC: 0.3 MG/DL (ref 0.1–2)
BUN BLD-MCNC: 28 MG/DL (ref 7–18)
CALCIUM BLD-MCNC: 8 MG/DL (ref 8.5–10.1)
CHLORIDE SERPL-SCNC: 110 MMOL/L (ref 98–112)
CO2 SERPL-SCNC: 27 MMOL/L (ref 21–32)
CREAT BLD-MCNC: 0.88 MG/DL
CRP SERPL-MCNC: 10.4 MG/DL (ref ?–0.3)
D DIMER PPP FEU-MCNC: 0.31 UG/ML FEU (ref ?–0.9)
DEPRECATED HBV CORE AB SER IA-ACNC: 326 NG/ML
EOSINOPHIL # BLD AUTO: 0 X10(3) UL (ref 0–0.7)
EOSINOPHIL NFR BLD AUTO: 0 %
ERYTHROCYTE [DISTWIDTH] IN BLOOD BY AUTOMATED COUNT: 12.7 %
GLOBULIN PLAS-MCNC: 3.1 G/DL (ref 2.8–4.4)
GLUCOSE BLD-MCNC: 127 MG/DL (ref 70–99)
HCT VFR BLD AUTO: 35.9 %
HGB BLD-MCNC: 11.1 G/DL
IMM GRANULOCYTES # BLD AUTO: 0.01 X10(3) UL (ref 0–1)
IMM GRANULOCYTES NFR BLD: 0.3 %
LDH SERPL L TO P-CCNC: 236 U/L
LYMPHOCYTES # BLD AUTO: 0.27 X10(3) UL (ref 1–4)
LYMPHOCYTES NFR BLD AUTO: 8.4 %
MCH RBC QN AUTO: 31.4 PG (ref 26–34)
MCHC RBC AUTO-ENTMCNC: 30.9 G/DL (ref 31–37)
MCV RBC AUTO: 101.7 FL
MONOCYTES # BLD AUTO: 0.14 X10(3) UL (ref 0.1–1)
MONOCYTES NFR BLD AUTO: 4.4 %
NEUTROPHILS # BLD AUTO: 2.78 X10 (3) UL (ref 1.5–7.7)
NEUTROPHILS # BLD AUTO: 2.78 X10(3) UL (ref 1.5–7.7)
NEUTROPHILS NFR BLD AUTO: 86.9 %
OSMOLALITY SERPL CALC.SUM OF ELEC: 303 MOSM/KG (ref 275–295)
PLATELET # BLD AUTO: 139 10(3)UL (ref 150–450)
POTASSIUM SERPL-SCNC: 4.2 MMOL/L (ref 3.5–5.1)
PROT SERPL-MCNC: 5.8 G/DL (ref 6.4–8.2)
RBC # BLD AUTO: 3.53 X10(6)UL
SODIUM SERPL-SCNC: 143 MMOL/L (ref 136–145)
WBC # BLD AUTO: 3.2 X10(3) UL (ref 4–11)

## 2022-01-11 PROCEDURE — 83615 LACTATE (LD) (LDH) ENZYME: CPT | Performed by: INTERNAL MEDICINE

## 2022-01-11 PROCEDURE — 85025 COMPLETE CBC W/AUTO DIFF WBC: CPT | Performed by: INTERNAL MEDICINE

## 2022-01-11 PROCEDURE — 86140 C-REACTIVE PROTEIN: CPT | Performed by: INTERNAL MEDICINE

## 2022-01-11 PROCEDURE — 80053 COMPREHEN METABOLIC PANEL: CPT | Performed by: INTERNAL MEDICINE

## 2022-01-11 PROCEDURE — 85379 FIBRIN DEGRADATION QUANT: CPT | Performed by: INTERNAL MEDICINE

## 2022-01-11 PROCEDURE — 82728 ASSAY OF FERRITIN: CPT | Performed by: INTERNAL MEDICINE

## 2022-01-11 NOTE — PLAN OF CARE
Problem: Patient/Family Goals  Goal: Patient/Family Long Term Goal  Description: Patient's Long Term Goal: discharge    Interventions:  - remdesevir and decadron  - wean O2  - PT/ OT consult  - See additional Care Plan goals for specific interventions  Out other facility with appropriate resources  Description: INTERVENTIONS:  - Identify barriers to discharge w/pt and caregiver  - Include patient/family/discharge partner in discharge planning  - Arrange for needed discharge resources and transportation as ap

## 2022-01-11 NOTE — PROGRESS NOTES
INFECTIOUS DISEASE TELEMEDICINE PROGRESS NOTE    Ganesh Christian Patient Status:  Emergency    1931 MRN [id number]   Location [address] [facility] [address] Attending No att. providers found   Hosp Day # 1 PCP Andrey Renee MD     Dec Systems:  Completed. See pertinent positives and negatives in the the HPI. Physical Exam:  Vital signs: Blood pressure (!) 173/85, pulse 60, temperature 98.2 °F (36.8 °C), temperature source Oral, resp.  rate 16, height 5' (1.524 m), weight 152 lb 1.9 oz INDICATIONS:  cough, congetion for about a week; no other symptoms  PATIENT STATED HISTORY: (As transcribed by Technologist)  Patient offered no additional history at this time. CONCLUSION:  Mild cardiac enlargement.   Pulmonary vascularity is

## 2022-01-11 NOTE — ED PROVIDER NOTES
Patient Seen in: [facility] 3nw-a      History   Patient presents with:  Cough/URI  Covid    Stated Complaint: cough, congetion for about a week; no other symptoms    Subjective:   HPI    51-year-old male who is vaccinated and known COVID-positive p Smokeless tobacco: Never Used    Vaping Use      Vaping Use: Never used    Alcohol use: No    Drug use: No             Review of Systems   All other systems reviewed and are negative.       Positive for stated complaint: cough, congetion for about a week; n Abnormal; Notable for the following components:       Result Value    Pro-Beta Natriuretic Peptide 539 (*)     All other components within normal limits   COMP METABOLIC PANEL (14) - Abnormal; Notable for the following components:    BUN 24 (*)     Calcium DIFFERENTIAL WITH PLATELET    Narrative: The following orders were created for panel order CBC With Differential With Platelet.   Procedure                               Abnormality         Status                     --------- obtained. We obtain the COVID-19 blood work. He had a chest x-ray that was consistent with COVID-pneumonia as well.   The case was discussed with the [facility] hospitalist as well as with infectious disease and medications were initiated for his COVID-19 infect

## 2022-01-11 NOTE — CONSULTS
INFECTIOUS DISEASE CONSULT NOTE    Tristen Avelar Patient Status:  Emergency    1931 MRN [id number]   Location [facility] [facility] Attending No att. providers found   Hosp Day # 0 PCP Leana Smith MD       Reason for Cons pipe. He smoked 0.00 packs per day. He has never used smokeless tobacco. He reports that he does not drink alcohol and does not use drugs.     Allergies:    Contrast Dye [Gadol*      No Known Allergies      UNKNOWN    Medications:    Current Facility-Admini MCV 98.8   MCH 33.3   MCHC 33.7   RDW 12.8   NEPRELIM 4.87   WBC 6.1   .0*     Recent Labs   Lab 01/10/22  1508   GLU 98   BUN 24*   CREATSERUM 0.88   GFRAA 87   GFRNAA 76   CA 7.9*   ALB 2.8*      K 3.6      CO2 27.0   ALKPHO 66   AST Toni Solis MD on 1/10/2022 at 1:43 PM     Finalized by (CST): Toni Solis MD on 1/10/2022 at 1:44 PM          ASSESSMENT/ PLAN:    1. Covid 19 infection with pna  - vaccinated.  No booster yet. symptoms for about 5 days  - doubt superimposed bacteria

## 2022-01-11 NOTE — PROGRESS NOTES
A&O x4, forgetful at times. Denies any CP, MILLY, or calf pain at present. Lungs clear and diminished bilaterally. Saturating well on 1L O2, will attempt to wean if able. NSR on tele. Abdomen soft, nontender, nondistended.  Bowel sounds active, pt denies any

## 2022-01-11 NOTE — PROGRESS NOTES
COVID-19 Daily Discharge Readiness-Nursing    O2 Sat at Rest:   95  %   O2 Sat with Exertion:    % on  2  liters   Temperature max from last 24 hrs: Temp (24hrs), Av.3 °F (37.4 °C), Min:98.9 °F (37.2 °C), Max:99.6 °F (37.6 °C)    Inflammatory Markers:

## 2022-01-11 NOTE — PROGRESS NOTES
[facility] Hospitalist Progress Note     PCP: Silva Cordoba MD    Chief Complaint: follow-up    Overnight/Interim Events:      SUBJECTIVE:  Pt laying in bed, feeling ok, cough is main sx. Just placed on RA.       OBJECTIVE:  Temp:  [98 °F (36.7 °C)-98.9 °F last 168 hours.       Meds:     • albuterol  8 puff Inhalation QID   • remdesivir  100 mg Intravenous Q24H   • aspirin  81 mg Oral Daily   • terazosin  10 mg Oral Nightly   • apixaban  2.5 mg Oral BID   • lisinopril  20 mg Oral Daily   • metoprolol succinat

## 2022-01-12 VITALS
HEIGHT: 60 IN | WEIGHT: 152.13 LBS | BODY MASS INDEX: 29.86 KG/M2 | TEMPERATURE: 98 F | OXYGEN SATURATION: 93 % | RESPIRATION RATE: 18 BRPM | HEART RATE: 82 BPM | DIASTOLIC BLOOD PRESSURE: 95 MMHG | SYSTOLIC BLOOD PRESSURE: 131 MMHG

## 2022-01-12 LAB
ALBUMIN SERPL-MCNC: 2.7 G/DL (ref 3.4–5)
ALBUMIN/GLOB SERPL: 0.9 {RATIO} (ref 1–2)
ALP LIVER SERPL-CCNC: 59 U/L
ALT SERPL-CCNC: 16 U/L
ANION GAP SERPL CALC-SCNC: 5 MMOL/L (ref 0–18)
AST SERPL-CCNC: 16 U/L (ref 15–37)
BASOPHILS # BLD AUTO: 0 X10(3) UL (ref 0–0.2)
BASOPHILS NFR BLD AUTO: 0 %
BILIRUB SERPL-MCNC: 0.3 MG/DL (ref 0.1–2)
BUN BLD-MCNC: 41 MG/DL (ref 7–18)
CALCIUM BLD-MCNC: 7.9 MG/DL (ref 8.5–10.1)
CHLORIDE SERPL-SCNC: 111 MMOL/L (ref 98–112)
CO2 SERPL-SCNC: 27 MMOL/L (ref 21–32)
CREAT BLD-MCNC: 0.94 MG/DL
CRP SERPL-MCNC: 6.6 MG/DL (ref ?–0.3)
D DIMER PPP FEU-MCNC: 0.31 UG/ML FEU (ref ?–0.9)
DEPRECATED HBV CORE AB SER IA-ACNC: 431.2 NG/ML
EOSINOPHIL # BLD AUTO: 0 X10(3) UL (ref 0–0.7)
EOSINOPHIL NFR BLD AUTO: 0 %
ERYTHROCYTE [DISTWIDTH] IN BLOOD BY AUTOMATED COUNT: 12.8 %
GLOBULIN PLAS-MCNC: 3.1 G/DL (ref 2.8–4.4)
GLUCOSE BLD-MCNC: 117 MG/DL (ref 70–99)
HCT VFR BLD AUTO: 32.9 %
HGB BLD-MCNC: 11.1 G/DL
IMM GRANULOCYTES # BLD AUTO: 0.05 X10(3) UL (ref 0–1)
IMM GRANULOCYTES NFR BLD: 1 %
LDH SERPL L TO P-CCNC: 222 U/L
LYMPHOCYTES # BLD AUTO: 0.39 X10(3) UL (ref 1–4)
LYMPHOCYTES NFR BLD AUTO: 7.8 %
MCH RBC QN AUTO: 33.2 PG (ref 26–34)
MCHC RBC AUTO-ENTMCNC: 33.7 G/DL (ref 31–37)
MCV RBC AUTO: 98.5 FL
MONOCYTES # BLD AUTO: 0.5 X10(3) UL (ref 0.1–1)
MONOCYTES NFR BLD AUTO: 10 %
NEUTROPHILS # BLD AUTO: 4.08 X10 (3) UL (ref 1.5–7.7)
NEUTROPHILS # BLD AUTO: 4.08 X10(3) UL (ref 1.5–7.7)
NEUTROPHILS NFR BLD AUTO: 81.2 %
OSMOLALITY SERPL CALC.SUM OF ELEC: 307 MOSM/KG (ref 275–295)
PLATELET # BLD AUTO: 153 10(3)UL (ref 150–450)
POTASSIUM SERPL-SCNC: 4 MMOL/L (ref 3.5–5.1)
PROT SERPL-MCNC: 5.8 G/DL (ref 6.4–8.2)
RBC # BLD AUTO: 3.34 X10(6)UL
SODIUM SERPL-SCNC: 143 MMOL/L (ref 136–145)
WBC # BLD AUTO: 5 X10(3) UL (ref 4–11)

## 2022-01-12 PROCEDURE — 83615 LACTATE (LD) (LDH) ENZYME: CPT | Performed by: INTERNAL MEDICINE

## 2022-01-12 PROCEDURE — 97116 GAIT TRAINING THERAPY: CPT

## 2022-01-12 PROCEDURE — 85025 COMPLETE CBC W/AUTO DIFF WBC: CPT | Performed by: INTERNAL MEDICINE

## 2022-01-12 PROCEDURE — 82728 ASSAY OF FERRITIN: CPT | Performed by: INTERNAL MEDICINE

## 2022-01-12 PROCEDURE — 85379 FIBRIN DEGRADATION QUANT: CPT | Performed by: INTERNAL MEDICINE

## 2022-01-12 PROCEDURE — 80053 COMPREHEN METABOLIC PANEL: CPT | Performed by: INTERNAL MEDICINE

## 2022-01-12 PROCEDURE — 86140 C-REACTIVE PROTEIN: CPT | Performed by: INTERNAL MEDICINE

## 2022-01-12 PROCEDURE — 97161 PT EVAL LOW COMPLEX 20 MIN: CPT

## 2022-01-12 RX ORDER — BENZONATATE 200 MG/1
200 CAPSULE ORAL 3 TIMES DAILY PRN
Qty: 20 CAPSULE | Refills: 0 | Status: SHIPPED | OUTPATIENT
Start: 2022-01-12

## 2022-01-12 NOTE — PROGRESS NOTES
NURSING DISCHARGE NOTE    Discharged Home via Wheelchair. Accompanied by Family member  Belongings Taken by patient/family. Patient discharged to [facility] via wheelchair. Son-in-law to pick patient up and take home.  Discharge teaching given to ceyc

## 2022-01-12 NOTE — PHYSICAL THERAPY NOTE
PHYSICAL THERAPY EVALUATION - INPATIENT     Room Number: 333/333-A  Evaluation Date: 1/12/2022  Type of Evaluation: Initial  Physician Order: PT Eval and Treat    Presenting Problem: COVID19  Co-Morbidities : HTN, CAD, prostate CA  Reason for Therapy alarm       WEIGHT BEARING RESTRICTION  Weight Bearing Restriction: None                PAIN ASSESSMENT  Ratin          COGNITION  · Overall Cognitive Status:  WFL - within functional limits    RANGE OF MOTION AND STRENGTH ASSESSMENT  Upper extremity R flat (slow ivette)    Skilled Therapy Provided   Pt performs bed mobility without assistance. Pt ambulating with RW and performing sit-stand transfer from bed and toilet with supervision. Pt performed ADL at sink with supervision.  Pt educated on pacing an

## 2022-01-12 NOTE — DISCHARGE SUMMARY
General Medicine Discharge Summary     Patient ID:  Awais Jean  80year old  [id number]  12/30/1931    Admit date: 1/10/2022    Discharge date and time: 1/12/22    Attending Physician: Savage Barakat, *     Primary Care Physician: Trixie Garcia obtained.   COMPARISON:  [facility] , XR, XR CHEST AP PORTABLE  (CPT=71045), 11/07/2021, 2:27 PM.  INDICATIONS:  cough, congetion for about a week; no other symptoms  PATIENT STATED HISTORY: (As transcribed by Technologist)  Patient offered no additional histor hours as needed for pain    VITAMIN D OR  Take 2,000 Units by mouth daily. STOP taking these medications    amoxicillin clavulanate 875-125 MG Oral Tab          Home Medication Changes:      Activity: as directed  Diet: as directed  Wound Care: prn  Co numbers and our commitment to keep our patients, their families, and our staff safe, we have temporarily changed our visitor policy.      All patients and care staff must wear a mask covering their mouth and nose and will be asked screening questions regard

## 2022-01-12 NOTE — PLAN OF CARE
Problem: Patient/Family Goals  Goal: Patient/Family Long Term Goal  Description: Patient's Long Term Goal: discharge    Interventions:  - remdesevir and decadron  - wean O2  - PT/ OT consult  - See additional Care Plan goals for specific interventions  O other facility with appropriate resources  Description: INTERVENTIONS:  - Identify barriers to discharge w/pt and caregiver  - Include patient/family/discharge partner in discharge planning  - Arrange for needed discharge resources and transportation as ap

## 2022-01-12 NOTE — PROGRESS NOTES
[facility] Hospitalist Progress Note     PCP: Yoel Brooke MD    Chief Complaint: follow-up    Overnight/Interim Events:      SUBJECTIVE:  Sitting up in bed, on RA. Breathing ok. +cough still.  No diarrhea    OBJECTIVE:  Temp:  [97.6 °F (36.4 °C)-98.2 ° Oral Daily   • metoprolol succinate  25 mg Oral Daily Beta Blocker   • montelukast  10 mg Oral QPM   • guaiFENesin ER  600 mg Oral BID   • cholecalciferol  2,000 Units Oral Daily   • dexamethasone Sodium Phosphate  6 mg Intravenous Q24H       HYDROcodone-a

## 2022-01-12 NOTE — PROGRESS NOTES
INFECTIOUS DISEASE TELEMEDICINE PROGRESS NOTE    Abhay Forte Patient Status:  Emergency    1931 MRN [id number]   Location [facility] [facility] Attending No att. providers found   Hosp Day # 2 PCP Nereida Macdonald MD     Dec Systems:  Completed. See pertinent positives and negatives in the the HPI. Physical Exam:  Vital signs: Blood pressure (!) 165/78, pulse 62, temperature 97.7 °F (36.5 °C), temperature source Oral, resp.  rate 18, height 5' (1.524 m), weight 152 lb 1.9 oz (CPT=71045)    Result Date: 1/10/2022  PROCEDURE:  XR CHEST AP PORTABLE  (CPT=71045)  TECHNIQUE:  AP chest radiograph was obtained.   COMPARISON:  [facility] , XR, XR CHEST AP PORTABLE  (CPT=71045), 11/07/2021, 2:27 PM.  INDICATIONS:  cough, congetion for about

## 2022-01-12 NOTE — COVID NURSING ASSESSMENT
COVID-19 Daily Discharge Readiness-Nursing    O2 Sat at Rest:     96% on 2L o2  Temperature max from last 24 hrs: Temp (24hrs), Av °F (36.7 °C), Min:97.6 °F (36.4 °C), Max:98.2 °F (36.8 °C)    Inflammatory Markers: Recent Labs   Lab 01/10/22  1508

## 2022-01-12 NOTE — PLAN OF CARE
CARE PLAN ONLY:   Problem: Patient/Family Goals  Goal: Patient/Family Long Term Goal  Description: Patient's Long Term Goal: discharge    Interventions:  - remdesevir and decadron  - wean O2  - PT/ OT consult  - See additional Care Plan goals for specific Discharge to home or other facility with appropriate resources  Description: INTERVENTIONS:  - Identify barriers to discharge w/pt and caregiver  - Include patient/family/discharge partner in discharge planning  - Arrange for needed discharge resources and

## 2022-02-17 ENCOUNTER — HOSPITAL ENCOUNTER (OUTPATIENT)
Facility: HOSPITAL | Age: 87
Setting detail: OBSERVATION
Discharge: HOME | End: 2022-02-19
Attending: EMERGENCY MEDICINE | Admitting: HOSPITALIST
Payer: MEDICARE

## 2022-02-17 ENCOUNTER — APPOINTMENT (OUTPATIENT)
Dept: GENERAL RADIOLOGY | Facility: HOSPITAL | Age: 87
End: 2022-02-17
Attending: EMERGENCY MEDICINE
Payer: MEDICARE

## 2022-02-17 DIAGNOSIS — R19.7 DIARRHEA, UNSPECIFIED TYPE: Primary | ICD-10-CM

## 2022-02-17 DIAGNOSIS — R79.89 ELEVATED BRAIN NATRIURETIC PEPTIDE (BNP) LEVEL: ICD-10-CM

## 2022-02-17 DIAGNOSIS — J90 PLEURAL EFFUSION: ICD-10-CM

## 2022-02-17 DIAGNOSIS — R55 SYNCOPE, NEAR: ICD-10-CM

## 2022-02-17 DIAGNOSIS — I95.9 HYPOTENSION, UNSPECIFIED HYPOTENSION TYPE: ICD-10-CM

## 2022-02-17 PROBLEM — R73.9 HYPERGLYCEMIA: Status: ACTIVE | Noted: 2022-02-17

## 2022-02-17 LAB
ALBUMIN SERPL-MCNC: 3 G/DL (ref 3.4–5)
ALBUMIN/GLOB SERPL: 0.9 {RATIO} (ref 1–2)
ALP LIVER SERPL-CCNC: 74 U/L
ALT SERPL-CCNC: 22 U/L
ANION GAP SERPL CALC-SCNC: 4 MMOL/L (ref 0–18)
AST SERPL-CCNC: 49 U/L (ref 15–37)
BASOPHILS # BLD AUTO: 0.01 X10(3) UL (ref 0–0.2)
BILIRUB SERPL-MCNC: 0.5 MG/DL (ref 0.1–2)
BILIRUB UR QL STRIP.AUTO: NEGATIVE
BUN BLD-MCNC: 27 MG/DL (ref 7–18)
CALCIUM BLD-MCNC: 8.6 MG/DL (ref 8.5–10.1)
CHLORIDE SERPL-SCNC: 111 MMOL/L (ref 98–112)
CO2 SERPL-SCNC: 27 MMOL/L (ref 21–32)
COLOR UR AUTO: YELLOW
CREAT BLD-MCNC: 1.2 MG/DL
EOSINOPHIL # BLD AUTO: 0.01 X10(3) UL (ref 0–0.7)
EOSINOPHIL NFR BLD AUTO: 0.3 %
ERYTHROCYTE [DISTWIDTH] IN BLOOD BY AUTOMATED COUNT: 14.1 %
GLOBULIN PLAS-MCNC: 3.4 G/DL (ref 2.8–4.4)
GLUCOSE BLD-MCNC: 123 MG/DL (ref 70–99)
GLUCOSE UR STRIP.AUTO-MCNC: NEGATIVE MG/DL
HCT VFR BLD AUTO: 33.4 %
HGB BLD-MCNC: 10.9 G/DL
HYALINE CASTS #/AREA URNS AUTO: PRESENT /LPF
IMM GRANULOCYTES # BLD AUTO: 0.01 X10(3) UL (ref 0–1)
IMM GRANULOCYTES NFR BLD: 0.3 %
LEUKOCYTE ESTERASE UR QL STRIP.AUTO: NEGATIVE
LYMPHOCYTES # BLD AUTO: 0.92 X10(3) UL (ref 1–4)
MCHC RBC AUTO-ENTMCNC: 32.6 G/DL (ref 31–37)
MCV RBC AUTO: 99.4 FL
MONOCYTES # BLD AUTO: 0.56 X10(3) UL (ref 0.1–1)
MONOCYTES NFR BLD AUTO: 14.2 %
NEUTROPHILS # BLD AUTO: 2.44 X10 (3) UL (ref 1.5–7.7)
NEUTROPHILS # BLD AUTO: 2.44 X10(3) UL (ref 1.5–7.7)
NEUTROPHILS NFR BLD AUTO: 61.6 %
NITRITE UR QL STRIP.AUTO: NEGATIVE
NT-PROBNP SERPL-MCNC: 998 PG/ML (ref ?–450)
PH UR STRIP.AUTO: 5 [PH] (ref 5–8)
PLATELET # BLD AUTO: 149 10(3)UL (ref 150–450)
POTASSIUM SERPL-SCNC: 4.3 MMOL/L (ref 3.5–5.1)
PROCALCITONIN SERPL-MCNC: <0.05 NG/ML (ref ?–0.16)
PROT SERPL-MCNC: 6.4 G/DL (ref 6.4–8.2)
PROT UR STRIP.AUTO-MCNC: NEGATIVE MG/DL
RBC # BLD AUTO: 3.36 X10(6)UL
RBC UR QL AUTO: NEGATIVE
SARS-COV-2 RNA RESP QL NAA+PROBE: NOT DETECTED
SP GR UR STRIP.AUTO: 1.02 (ref 1–1.03)
TROPONIN I HIGH SENSITIVITY: 44 NG/L
UROBILINOGEN UR STRIP.AUTO-MCNC: <2 MG/DL
WBC # BLD AUTO: 4 X10(3) UL (ref 4–11)

## 2022-02-17 PROCEDURE — 93005 ELECTROCARDIOGRAM TRACING: CPT

## 2022-02-17 PROCEDURE — 81001 URINALYSIS AUTO W/SCOPE: CPT | Performed by: EMERGENCY MEDICINE

## 2022-02-17 PROCEDURE — 99285 EMERGENCY DEPT VISIT HI MDM: CPT

## 2022-02-17 PROCEDURE — 96360 HYDRATION IV INFUSION INIT: CPT

## 2022-02-17 PROCEDURE — 96361 HYDRATE IV INFUSION ADD-ON: CPT

## 2022-02-17 PROCEDURE — 71045 X-RAY EXAM CHEST 1 VIEW: CPT | Performed by: EMERGENCY MEDICINE

## 2022-02-17 PROCEDURE — 85025 COMPLETE CBC W/AUTO DIFF WBC: CPT | Performed by: EMERGENCY MEDICINE

## 2022-02-17 PROCEDURE — 83880 ASSAY OF NATRIURETIC PEPTIDE: CPT | Performed by: EMERGENCY MEDICINE

## 2022-02-17 PROCEDURE — 84484 ASSAY OF TROPONIN QUANT: CPT | Performed by: EMERGENCY MEDICINE

## 2022-02-17 PROCEDURE — 80053 COMPREHEN METABOLIC PANEL: CPT | Performed by: EMERGENCY MEDICINE

## 2022-02-17 PROCEDURE — 84145 PROCALCITONIN (PCT): CPT | Performed by: EMERGENCY MEDICINE

## 2022-02-17 PROCEDURE — 93010 ELECTROCARDIOGRAM REPORT: CPT

## 2022-02-17 RX ORDER — ACETAMINOPHEN 325 MG/1
650 TABLET ORAL EVERY 6 HOURS PRN
Status: DISCONTINUED | OUTPATIENT
Start: 2022-02-17 | End: 2022-02-19

## 2022-02-17 RX ORDER — METOPROLOL SUCCINATE 25 MG/1
25 TABLET, EXTENDED RELEASE ORAL
Status: DISCONTINUED | OUTPATIENT
Start: 2022-02-17 | End: 2022-02-19

## 2022-02-17 RX ORDER — ASPIRIN 81 MG/1
81 TABLET ORAL DAILY
Status: DISCONTINUED | OUTPATIENT
Start: 2022-02-17 | End: 2022-02-19

## 2022-02-17 RX ORDER — ONDANSETRON 2 MG/ML
4 INJECTION INTRAMUSCULAR; INTRAVENOUS EVERY 6 HOURS PRN
Status: DISCONTINUED | OUTPATIENT
Start: 2022-02-17 | End: 2022-02-19

## 2022-02-17 RX ORDER — BENZONATATE 200 MG/1
200 CAPSULE ORAL 3 TIMES DAILY PRN
Status: DISCONTINUED | OUTPATIENT
Start: 2022-02-17 | End: 2022-02-19

## 2022-02-17 RX ORDER — METOCLOPRAMIDE HYDROCHLORIDE 5 MG/ML
5 INJECTION INTRAMUSCULAR; INTRAVENOUS EVERY 8 HOURS PRN
Status: DISCONTINUED | OUTPATIENT
Start: 2022-02-17 | End: 2022-02-19

## 2022-02-17 RX ORDER — SODIUM CHLORIDE 9 MG/ML
INJECTION, SOLUTION INTRAVENOUS ONCE
Status: COMPLETED | OUTPATIENT
Start: 2022-02-17 | End: 2022-02-17

## 2022-02-17 NOTE — ED QUICK NOTES
Orders for admission, patient is aware of plan and ready to go upstairs. Any questions, please call ED RN Froilan Valentin at extension [phone].      Patient Covid vaccination status: Fully vaccinated within the last two weeks; possibly not fully immune     COVID Test Ordered in ED: Rapid SARS-CoV-2 by PCR    COVID Suspicion at Admission: N/A    Running Infusions:  None    Mental Status/LOC at time of transport: A/Ox3    Other pertinent information:   CIWA score: N/A   NIH score:  N/A

## 2022-02-17 NOTE — PLAN OF CARE
Problem: CARDIOVASCULAR - ADULT  Goal: Maintains optimal cardiac output and hemodynamic stability  Description: INTERVENTIONS:  - Monitor vital signs, rhythm, and trends  - Monitor for bleeding, hypotension and signs of decreased cardiac output  - Evaluate effectiveness of vasoactive medications to optimize hemodynamic stability  - Monitor arterial and/or venous puncture sites for bleeding and/or hematoma  - Assess quality of pulses, skin color and temperature  - Assess for signs of decreased coronary artery perfusion - ex. Angina  - Evaluate fluid balance, assess for edema, trend weights  Outcome: Progressing  Goal: Absence of cardiac arrhythmias or at baseline  Description: INTERVENTIONS:  - Continuous cardiac monitoring, monitor vital signs, obtain 12 lead EKG if indicated  - Evaluate effectiveness of antiarrhythmic and heart rate control medications as ordered  - Initiate emergency measures for life threatening arrhythmias  - Monitor electrolytes and administer replacement therapy as ordered  Outcome: Progressing  Received from ER. Support person at bedside. Pt A&Ox4, denies any CP, SOB or dizziness at this time. Pt oriented to room and use of call light. Fall precautions initiated. Will continue to monitor. Monitor BP. Labs and meds as ordered. Fall precautions.

## 2022-02-17 NOTE — ED INITIAL ASSESSMENT (HPI)
Patient to ED via EMS. Reports the patient has been having increased diarrhea the past few days. Today felt dizzy while on the toilet, + near syncope per EMS. BP 90/60  100cc 0.9NS given enroute   Patient recently dx with afib, currently has an outpatient heart monitor on.

## 2022-02-18 LAB
ALBUMIN SERPL-MCNC: 2.8 G/DL (ref 3.4–5)
ALBUMIN/GLOB SERPL: 1 {RATIO} (ref 1–2)
ALT SERPL-CCNC: 18 U/L
ANION GAP SERPL CALC-SCNC: 3 MMOL/L (ref 0–18)
AST SERPL-CCNC: 23 U/L (ref 15–37)
ATRIAL RATE: 53 BPM
BASOPHILS # BLD AUTO: 0.02 X10(3) UL (ref 0–0.2)
BASOPHILS NFR BLD AUTO: 0.5 %
BILIRUB SERPL-MCNC: 0.4 MG/DL (ref 0.1–2)
BUN BLD-MCNC: 27 MG/DL (ref 7–18)
C DIFF TOX B STL QL: POSITIVE
CALCIUM BLD-MCNC: 8 MG/DL (ref 8.5–10.1)
CHLORIDE SERPL-SCNC: 114 MMOL/L (ref 98–112)
CREAT BLD-MCNC: 0.94 MG/DL
EOSINOPHIL # BLD AUTO: 0.04 X10(3) UL (ref 0–0.7)
EOSINOPHIL NFR BLD AUTO: 1.1 %
ERYTHROCYTE [DISTWIDTH] IN BLOOD BY AUTOMATED COUNT: 14.4 %
GLOBULIN PLAS-MCNC: 2.7 G/DL (ref 2.8–4.4)
GLUCOSE BLD-MCNC: 86 MG/DL (ref 70–99)
HCT VFR BLD AUTO: 30.9 %
HGB BLD-MCNC: 10.1 G/DL
IMM GRANULOCYTES # BLD AUTO: 0.01 X10(3) UL (ref 0–1)
IMM GRANULOCYTES NFR BLD: 0.3 %
LYMPHOCYTES # BLD AUTO: 1.17 X10(3) UL (ref 1–4)
LYMPHOCYTES NFR BLD AUTO: 31.3 %
MAGNESIUM SERPL-MCNC: 2 MG/DL (ref 1.6–2.6)
MCH RBC QN AUTO: 32.7 PG (ref 26–34)
MCHC RBC AUTO-ENTMCNC: 32.7 G/DL (ref 31–37)
MCV RBC AUTO: 100 FL
MONOCYTES # BLD AUTO: 0.45 X10(3) UL (ref 0.1–1)
MONOCYTES NFR BLD AUTO: 12 %
NEUTROPHILS # BLD AUTO: 2.05 X10 (3) UL (ref 1.5–7.7)
NEUTROPHILS # BLD AUTO: 2.05 X10(3) UL (ref 1.5–7.7)
NEUTROPHILS NFR BLD AUTO: 54.8 %
OSMOLALITY SERPL CALC.SUM OF ELEC: 302 MOSM/KG (ref 275–295)
PLATELET # BLD AUTO: 140 10(3)UL (ref 150–450)
PROT SERPL-MCNC: 5.5 G/DL (ref 6.4–8.2)
Q-T INTERVAL: 388 MS
QRS DURATION: 82 MS
QTC CALCULATION (BEZET): 421 MS
R AXIS: 1 DEGREES
RBC # BLD AUTO: 3.09 X10(6)UL
SODIUM SERPL-SCNC: 144 MMOL/L (ref 136–145)
T AXIS: 29 DEGREES
VENTRICULAR RATE: 71 BPM
WBC # BLD AUTO: 3.7 X10(3) UL (ref 4–11)

## 2022-02-18 PROCEDURE — 80053 COMPREHEN METABOLIC PANEL: CPT | Performed by: HOSPITALIST

## 2022-02-18 PROCEDURE — 83735 ASSAY OF MAGNESIUM: CPT | Performed by: HOSPITALIST

## 2022-02-18 PROCEDURE — 97535 SELF CARE MNGMENT TRAINING: CPT

## 2022-02-18 PROCEDURE — 87493 C DIFF AMPLIFIED PROBE: CPT | Performed by: EMERGENCY MEDICINE

## 2022-02-18 PROCEDURE — 97530 THERAPEUTIC ACTIVITIES: CPT

## 2022-02-18 PROCEDURE — 97165 OT EVAL LOW COMPLEX 30 MIN: CPT

## 2022-02-18 PROCEDURE — 85025 COMPLETE CBC W/AUTO DIFF WBC: CPT | Performed by: HOSPITALIST

## 2022-02-18 PROCEDURE — 97162 PT EVAL MOD COMPLEX 30 MIN: CPT

## 2022-02-18 RX ORDER — LISINOPRIL 20 MG/1
20 TABLET ORAL DAILY
Status: DISCONTINUED | OUTPATIENT
Start: 2022-02-18 | End: 2022-02-19

## 2022-02-18 RX ORDER — POTASSIUM CHLORIDE 20 MEQ/1
40 TABLET, EXTENDED RELEASE ORAL ONCE
Status: COMPLETED | OUTPATIENT
Start: 2022-02-18 | End: 2022-02-18

## 2022-02-18 RX ORDER — VANCOMYCIN HYDROCHLORIDE 125 MG/1
125 CAPSULE ORAL EVERY 6 HOURS
Status: DISCONTINUED | OUTPATIENT
Start: 2022-02-18 | End: 2022-02-19

## 2022-02-18 NOTE — PLAN OF CARE
Assumed pt care at 2330. Pt resting in bed in no apparent distress. Denies any cp,lightheadedness or dizziness. NSR on tele. BP elevated this am. Will give Toprol as scheduled. Afebrile. O2 sats > 92% on RA. Pt up w/ sba x 1. Voids per urinal at the bedside. All safety precautions in place. Will cont to monitor. Problem: CARDIOVASCULAR - ADULT  Goal: Maintains optimal cardiac output and hemodynamic stability  Description: INTERVENTIONS:  - Monitor vital signs, rhythm, and trends  - Monitor for bleeding, hypotension and signs of decreased cardiac output  - Evaluate effectiveness of vasoactive medications to optimize hemodynamic stability  - Monitor arterial and/or venous puncture sites for bleeding and/or hematoma  - Assess quality of pulses, skin color and temperature  - Assess for signs of decreased coronary artery perfusion - ex.  Angina  - Evaluate fluid balance, assess for edema, trend weights  2/18/2022 0438 by Leon Damon RN  Outcome: Progressing  2/18/2022 0438 by Leon Damon RN  Outcome: Progressing  Goal: Absence of cardiac arrhythmias or at baseline  Description: INTERVENTIONS:  - Continuous cardiac monitoring, monitor vital signs, obtain 12 lead EKG if indicated  - Evaluate effectiveness of antiarrhythmic and heart rate control medications as ordered  - Initiate emergency measures for life threatening arrhythmias  - Monitor electrolytes and administer replacement therapy as ordered  2/18/2022 0438 by Leon Damon RN  Outcome: Progressing  2/18/2022 0438 by Leon Damon RN  Outcome: Progressing     Problem: MUSCULOSKELETAL - ADULT  Goal: Return mobility to safest level of function  Description: INTERVENTIONS:  - Assess patient stability and activity tolerance for standing, transferring and ambulating w/ or w/o assistive devices  - Assist with transfers and ambulation using safe patient handling equipment as needed  - Ensure adequate protection for wounds/incisions during mobilization  - Obtain PT/OT consults as needed  - Advance activity as appropriate  - Communicate ordered activity level and limitations with patient/family  Outcome: Progressing

## 2022-02-18 NOTE — PLAN OF CARE
Patient alert and oriented x4. UP with standby assist. On RA. Continent of bowel and bladder. No complaints of pain, shortness of breath, or chest pain/discomfort. Fall precautions in place. Call light within reach. Problem: CARDIOVASCULAR - ADULT  Goal: Maintains optimal cardiac output and hemodynamic stability  Description: INTERVENTIONS:  - Monitor vital signs, rhythm, and trends  - Monitor for bleeding, hypotension and signs of decreased cardiac output  - Evaluate effectiveness of vasoactive medications to optimize hemodynamic stability  - Monitor arterial and/or venous puncture sites for bleeding and/or hematoma  - Assess quality of pulses, skin color and temperature  - Assess for signs of decreased coronary artery perfusion - ex.  Angina  - Evaluate fluid balance, assess for edema, trend weights  2/18/2022 0857 by Jane Mehta RN  Outcome: Progressing  2/18/2022 0856 by Jane Mehta RN  Outcome: Progressing  Goal: Absence of cardiac arrhythmias or at baseline  Description: INTERVENTIONS:  - Continuous cardiac monitoring, monitor vital signs, obtain 12 lead EKG if indicated  - Evaluate effectiveness of antiarrhythmic and heart rate control medications as ordered  - Initiate emergency measures for life threatening arrhythmias  - Monitor electrolytes and administer replacement therapy as ordered  2/18/2022 0857 by Jane Mehta RN  Outcome: Progressing  2/18/2022 0856 by Jane Mehta RN  Outcome: Progressing     Problem: HEMATOLOGIC - ADULT  Goal: Maintains hematologic stability  Description: INTERVENTIONS  - Assess for signs and symptoms of bleeding or hemorrhage  - Monitor labs and vital signs for trends  - Administer supportive blood products/factors, fluids and medications as ordered and appropriate  - Administer supportive blood products/factors as ordered and appropriate  2/18/2022 0857 by Jane Mehta RN  Outcome: Progressing  2/18/2022 0856 by Jane Mehta RN  Outcome: Progressing Problem: MUSCULOSKELETAL - ADULT  Goal: Return mobility to safest level of function  Description: INTERVENTIONS:  - Assess patient stability and activity tolerance for standing, transferring and ambulating w/ or w/o assistive devices  - Assist with transfers and ambulation using safe patient handling equipment as needed  - Ensure adequate protection for wounds/incisions during mobilization  - Obtain PT/OT consults as needed  - Advance activity as appropriate  - Communicate ordered activity level and limitations with patient/family  2/18/2022 0857 by Rufina Street RN  Outcome: Progressing  2/18/2022 0856 by Rufina Street RN  Outcome: Progressing  Goal: Maintain proper alignment of affected body part  Description: INTERVENTIONS:  - Support and protect limb and body alignment per provider's orders  - Instruct and reinforce with patient and family use of appropriate assistive device and precautions (e.g. spinal or hip dislocation precautions)  2/18/2022 0857 by Rufina Street RN  Outcome: Progressing  2/18/2022 0856 by Rufina Street RN  Outcome: Progressing     Problem: Patient/Family Goals  Goal: Patient/Family Long Term Goal  Description: Patient's Long Term Goal: to go home     Interventions:  - PT/OT  - See additional Care Plan goals for specific interventions  2/18/2022 0857 by Rufina Street RN  Outcome: Progressing  2/18/2022 0856 by Rufina Street RN  Outcome: Progressing  Goal: Patient/Family Short Term Goal  Description: Patient's Short Term Goal: blood pressure management     Interventions:   - cardiology consult  - medication adjustments  - See additional Care Plan goals for specific interventions  2/18/2022 0857 by Rufina Street RN  Outcome: Progressing  2/18/2022 0856 by Rufina Street RN  Outcome: Progressing

## 2022-02-19 VITALS
HEART RATE: 77 BPM | DIASTOLIC BLOOD PRESSURE: 66 MMHG | RESPIRATION RATE: 18 BRPM | SYSTOLIC BLOOD PRESSURE: 144 MMHG | HEIGHT: 65 IN | OXYGEN SATURATION: 97 % | BODY MASS INDEX: 23.32 KG/M2 | TEMPERATURE: 98 F | WEIGHT: 140 LBS

## 2022-02-19 LAB
ANION GAP SERPL CALC-SCNC: 5 MMOL/L (ref 0–18)
ATRIAL RATE: 60 BPM
BUN BLD-MCNC: 26 MG/DL (ref 7–18)
CALCIUM BLD-MCNC: 8.1 MG/DL (ref 8.5–10.1)
CHLORIDE SERPL-SCNC: 111 MMOL/L (ref 98–112)
CO2 SERPL-SCNC: 26 MMOL/L (ref 21–32)
CREAT BLD-MCNC: 0.79 MG/DL
GLUCOSE BLD-MCNC: 90 MG/DL (ref 70–99)
MAGNESIUM SERPL-MCNC: 1.9 MG/DL (ref 1.6–2.6)
OSMOLALITY SERPL CALC.SUM OF ELEC: 298 MOSM/KG (ref 275–295)
P AXIS: 63 DEGREES
P-R INTERVAL: 180 MS
POTASSIUM SERPL-SCNC: 3.9 MMOL/L (ref 3.5–5.1)
POTASSIUM SERPL-SCNC: 3.9 MMOL/L (ref 3.5–5.1)
Q-T INTERVAL: 436 MS
QRS DURATION: 90 MS
QTC CALCULATION (BEZET): 436 MS
R AXIS: 18 DEGREES
SODIUM SERPL-SCNC: 142 MMOL/L (ref 136–145)
T AXIS: 31 DEGREES
TROPONIN I HIGH SENSITIVITY: 46 NG/L
VENTRICULAR RATE: 60 BPM

## 2022-02-19 PROCEDURE — 84132 ASSAY OF SERUM POTASSIUM: CPT | Performed by: HOSPITALIST

## 2022-02-19 PROCEDURE — 96374 THER/PROPH/DIAG INJ IV PUSH: CPT

## 2022-02-19 PROCEDURE — 80048 BASIC METABOLIC PNL TOTAL CA: CPT | Performed by: INTERNAL MEDICINE

## 2022-02-19 PROCEDURE — 93010 ELECTROCARDIOGRAM REPORT: CPT | Performed by: INTERNAL MEDICINE

## 2022-02-19 PROCEDURE — 83735 ASSAY OF MAGNESIUM: CPT | Performed by: INTERNAL MEDICINE

## 2022-02-19 PROCEDURE — 93005 ELECTROCARDIOGRAM TRACING: CPT

## 2022-02-19 PROCEDURE — 84484 ASSAY OF TROPONIN QUANT: CPT | Performed by: INTERNAL MEDICINE

## 2022-02-19 RX ORDER — VANCOMYCIN HYDROCHLORIDE 125 MG/1
125 CAPSULE ORAL EVERY 6 HOURS
Qty: 40 CAPSULE | Refills: 0 | Status: SHIPPED | OUTPATIENT
Start: 2022-02-19 | End: 2022-03-01

## 2022-02-19 RX ORDER — AMLODIPINE BESYLATE 5 MG/1
5 TABLET ORAL DAILY
Status: DISCONTINUED | OUTPATIENT
Start: 2022-02-19 | End: 2022-02-19

## 2022-02-19 RX ORDER — HYDRALAZINE HYDROCHLORIDE 25 MG/1
25 TABLET, FILM COATED ORAL EVERY 6 HOURS PRN
Status: DISCONTINUED | OUTPATIENT
Start: 2022-02-19 | End: 2022-02-19

## 2022-02-19 RX ORDER — LISINOPRIL 40 MG/1
40 TABLET ORAL DAILY
Status: DISCONTINUED | OUTPATIENT
Start: 2022-02-19 | End: 2022-02-19

## 2022-02-19 RX ORDER — LISINOPRIL 40 MG/1
40 TABLET ORAL DAILY
Qty: 30 TABLET | Refills: 1 | Status: SHIPPED | OUTPATIENT
Start: 2022-02-20 | End: 2022-03-15

## 2022-02-19 RX ORDER — HYDRALAZINE HYDROCHLORIDE 20 MG/ML
10 INJECTION INTRAMUSCULAR; INTRAVENOUS EVERY 6 HOURS PRN
Status: DISCONTINUED | OUTPATIENT
Start: 2022-02-19 | End: 2022-02-19

## 2022-02-19 RX ORDER — AMLODIPINE BESYLATE 5 MG/1
5 TABLET ORAL DAILY
Qty: 30 TABLET | Refills: 1 | Status: SHIPPED | OUTPATIENT
Start: 2022-02-20 | End: 2022-03-15

## 2022-02-19 NOTE — PLAN OF CARE
Assumed care this evening at 1930. Pt is alert and oriented x 4. RA, maintaining SPO2 above 93%. Sinus Rhythm w/ multifocal PVC's on tele.  this AM - will give scheduled cardiac meds and reassess, will notify cards. Pt denies any chest pain/ pressure, shortness of breath or lightheadedness/dizziness at this time. Stool sample (+) C. Diff on 2/18; however, pt has not had BM since 3AM 2/17. Pt denies taking any home medications. BS active, abd round, nontender. Pt is resting in bed, bed alarm and call light within reach. Will continue to monitor. Notified cardiology of what appears to be changes in pt's T wave - Repeat EKG, BMP, Mag, and Trops ordered. PRN antihypertensive medication ordered. Pt denies chest pain/pressure or SOB,  at this time. Will continue to monitor. Problem: CARDIOVASCULAR - ADULT  Goal: Maintains optimal cardiac output and hemodynamic stability  Description: INTERVENTIONS:  - Monitor vital signs, rhythm, and trends  - Monitor for bleeding, hypotension and signs of decreased cardiac output  - Evaluate effectiveness of vasoactive medications to optimize hemodynamic stability  - Monitor arterial and/or venous puncture sites for bleeding and/or hematoma  - Assess quality of pulses, skin color and temperature  - Assess for signs of decreased coronary artery perfusion - ex.  Angina  - Evaluate fluid balance, assess for edema, trend weights  Outcome: Progressing  Goal: Absence of cardiac arrhythmias or at baseline  Description: INTERVENTIONS:  - Continuous cardiac monitoring, monitor vital signs, obtain 12 lead EKG if indicated  - Evaluate effectiveness of antiarrhythmic and heart rate control medications as ordered  - Initiate emergency measures for life threatening arrhythmias  - Monitor electrolytes and administer replacement therapy as ordered  Outcome: Progressing     Problem: Patient/Family Goals  Goal: Patient/Family Long Term Goal  Description: Patient's Long Term Goal: to go home Interventions:  - PT/OT  - See additional Care Plan goals for specific interventions  Outcome: Progressing  Goal: Patient/Family Short Term Goal  Description: Patient's Short Term Goal: blood pressure management     Interventions:   - cardiology consult  - medication adjustments  - See additional Care Plan goals for specific interventions  Outcome: Progressing     Problem: HEMATOLOGIC - ADULT  Goal: Maintains hematologic stability  Description: INTERVENTIONS  - Assess for signs and symptoms of bleeding or hemorrhage  - Monitor labs and vital signs for trends  - Administer supportive blood products/factors, fluids and medications as ordered and appropriate  - Administer supportive blood products/factors as ordered and appropriate  Outcome: Progressing     Problem: MUSCULOSKELETAL - ADULT  Goal: Return mobility to safest level of function  Description: INTERVENTIONS:  - Assess patient stability and activity tolerance for standing, transferring and ambulating w/ or w/o assistive devices  - Assist with transfers and ambulation using safe patient handling equipment as needed  - Ensure adequate protection for wounds/incisions during mobilization  - Obtain PT/OT consults as needed  - Advance activity as appropriate  - Communicate ordered activity level and limitations with patient/family  Outcome: Progressing  Goal: Maintain proper alignment of affected body part  Description: INTERVENTIONS:  - Support and protect limb and body alignment per provider's orders  - Instruct and reinforce with patient and family use of appropriate assistive device and precautions (e.g. spinal or hip dislocation precautions)  Outcome: Progressing

## 2022-02-19 NOTE — PROGRESS NOTES
02/19/22 0953 02/19/22 0955 02/19/22 0957   Vital Signs   Pulse 68 71 77   /61 134/69 134/77   MAP (mmHg) 83 84 95   BP Location Right arm Right arm Right arm   BP Method Automatic Automatic Automatic   Patient Position Lying Sitting Standing

## 2022-02-19 NOTE — PLAN OF CARE
Problem: CARDIOVASCULAR - ADULT  Vital signs are stable  Denies chest pain , palpitations, headache or dizziness. Goal: Maintains optimal cardiac output and hemodynamic stability  Description: INTERVENTIONS:  - Monitor vital signs, rhythm, and trends  - Monitor for bleeding, hypotension and signs of decreased cardiac output  - Evaluate effectiveness of vasoactive medications to optimize hemodynamic stability  - Monitor arterial and/or venous puncture sites for bleeding and/or hematoma  - Assess quality of pulses, skin color and temperature  - Assess for signs of decreased coronary artery perfusion - ex.  Angina  - Evaluate fluid balance, assess for edema, trend weights  Outcome: Progressing  Goal: Absence of cardiac arrhythmias or at baseline  Intermittent bigeminy  Event monitor place on patient before discharge  Description: INTERVENTIONS:  - Continuous cardiac monitoring, monitor vital signs, obtain 12 lead EKG if indicated  - Evaluate effectiveness of antiarrhythmic and heart rate control medications as ordered  - Initiate emergency measures for life threatening arrhythmias  - Monitor electrolytes and administer replacement therapy as ordered  Outcome: Progressing     Problem: HEMATOLOGIC - ADULT  Stable ,  Brown , loose stools x 3  Goal: Maintains hematologic stability  Description: INTERVENTIONS  - Assess for signs and symptoms of bleeding or hemorrhage  - Monitor labs and vital signs for trends  - Administer supportive blood products/factors, fluids and medications as ordered and appropriate  - Administer supportive blood products/factors as ordered and appropriate  Outcome: Progressing     Problem: MUSCULOSKELETAL - ADULT  Using walker to ambulate in room  Goal: Return mobility to safest level of function  Description: INTERVENTIONS:  - Assess patient stability and activity tolerance for standing, transferring and ambulating w/ or w/o assistive devices  - Assist with transfers and ambulation using safe patient handling equipment as needed  - Ensure adequate protection for wounds/incisions during mobilization  - Obtain PT/OT consults as needed  - Advance activity as appropriate  - Communicate ordered activity level and limitations with patient/family  Outcome: Progressing  Goal: Maintain proper alignment of affected body part  Description: INTERVENTIONS:  - Support and protect limb and body alignment per provider's orders  - Instruct and reinforce with patient and family use of appropriate assistive device and precautions (e.g. spinal or hip dislocation precautions)  Outcome: Progressing     Problem: Patient/Family Goals  Discharge home today with family  Discharge instructions reviewed with patient and son-n-law  Goal: Patient/Family Long Term Goal  Description: Patient's Long Term Goal: to go home     Interventions:  - PT/OT  - See additional Care Plan goals for specific interventions  Outcome: Progressing  Goal: Patient/Family Short Term Goal  Blood Pressure medication management in progress  BP elevated 188/ 186, asymptomatic  Hydralazine 10 mg IVP given  BP decrease to 071 systolic  then 279/48. Dr. Moshe Valdez notified of BP elevations and treatment  Plan to monitor as an outpatient, ok for discharge.   Description: Patient's Short Term Goal: blood pressure management     Interventions:   - cardiology consult  - medication adjustments  - See additional Care Plan goals for specific interventions  Outcome: Progressing

## 2022-09-24 ENCOUNTER — HOSPITAL ENCOUNTER (EMERGENCY)
Facility: HOSPITAL | Age: 87
Discharge: HOME | End: 2022-09-24
Attending: EMERGENCY MEDICINE

## 2022-09-24 ENCOUNTER — APPOINTMENT (OUTPATIENT)
Dept: CT IMAGING | Facility: HOSPITAL | Age: 87
End: 2022-09-24
Attending: EMERGENCY MEDICINE

## 2022-09-24 ENCOUNTER — APPOINTMENT (OUTPATIENT)
Dept: GENERAL RADIOLOGY | Facility: HOSPITAL | Age: 87
End: 2022-09-24
Attending: EMERGENCY MEDICINE

## 2022-09-24 VITALS
HEIGHT: 60 IN | RESPIRATION RATE: 21 BRPM | BODY MASS INDEX: 28.47 KG/M2 | DIASTOLIC BLOOD PRESSURE: 66 MMHG | HEART RATE: 68 BPM | WEIGHT: 145 LBS | OXYGEN SATURATION: 95 % | TEMPERATURE: 98 F | SYSTOLIC BLOOD PRESSURE: 124 MMHG

## 2022-09-24 DIAGNOSIS — R53.1 WEAKNESS GENERALIZED: Primary | ICD-10-CM

## 2022-09-24 LAB
ALBUMIN SERPL-MCNC: 3.1 G/DL (ref 3.4–5)
ALBUMIN/GLOB SERPL: 1 {RATIO} (ref 1–2)
ALP LIVER SERPL-CCNC: 80 U/L
ALT SERPL-CCNC: 19 U/L
ANION GAP SERPL CALC-SCNC: 5 MMOL/L (ref 0–18)
AST SERPL-CCNC: 21 U/L (ref 15–37)
BASOPHILS # BLD AUTO: 0.03 X10(3) UL (ref 0–0.2)
BASOPHILS NFR BLD AUTO: 0.5 %
BILIRUB SERPL-MCNC: 0.4 MG/DL (ref 0.1–2)
BILIRUB UR QL STRIP.AUTO: NEGATIVE
BUN BLD-MCNC: 28 MG/DL (ref 7–18)
CALCIUM BLD-MCNC: 8.4 MG/DL (ref 8.5–10.1)
CHLORIDE SERPL-SCNC: 109 MMOL/L (ref 98–112)
CLARITY UR REFRACT.AUTO: CLEAR
CO2 SERPL-SCNC: 27 MMOL/L (ref 21–32)
COLOR UR AUTO: YELLOW
CREAT BLD-MCNC: 1.09 MG/DL
EOSINOPHIL # BLD AUTO: 0.07 X10(3) UL (ref 0–0.7)
EOSINOPHIL NFR BLD AUTO: 1.2 %
ERYTHROCYTE [DISTWIDTH] IN BLOOD BY AUTOMATED COUNT: 13.3 %
GFR SERPLBLD BASED ON 1.73 SQ M-ARVRAT: 64 ML/MIN/1.73M2 (ref 60–?)
GLOBULIN PLAS-MCNC: 3 G/DL (ref 2.8–4.4)
GLUCOSE BLD-MCNC: 103 MG/DL (ref 70–99)
GLUCOSE UR STRIP.AUTO-MCNC: NEGATIVE MG/DL
HCT VFR BLD AUTO: 35.3 %
HGB BLD-MCNC: 11.3 G/DL
IMM GRANULOCYTES # BLD AUTO: 0.01 X10(3) UL (ref 0–1)
IMM GRANULOCYTES NFR BLD: 0.2 %
KETONES UR STRIP.AUTO-MCNC: NEGATIVE MG/DL
LACTATE SERPL-SCNC: 1.7 MMOL/L (ref 0.4–2)
LEUKOCYTE ESTERASE UR QL STRIP.AUTO: NEGATIVE
LYMPHOCYTES # BLD AUTO: 0.99 X10(3) UL (ref 1–4)
LYMPHOCYTES NFR BLD AUTO: 17.4 %
MCH RBC QN AUTO: 32.3 PG (ref 26–34)
MCHC RBC AUTO-ENTMCNC: 32 G/DL (ref 31–37)
MCV RBC AUTO: 100.9 FL
MONOCYTES # BLD AUTO: 0.7 X10(3) UL (ref 0.1–1)
MONOCYTES NFR BLD AUTO: 12.3 %
NEUTROPHILS # BLD AUTO: 3.9 X10 (3) UL (ref 1.5–7.7)
NEUTROPHILS # BLD AUTO: 3.9 X10(3) UL (ref 1.5–7.7)
NEUTROPHILS NFR BLD AUTO: 68.4 %
NITRITE UR QL STRIP.AUTO: NEGATIVE
OSMOLALITY SERPL CALC.SUM OF ELEC: 298 MOSM/KG (ref 275–295)
PH UR STRIP.AUTO: 5.5 [PH] (ref 5–8)
PLATELET # BLD AUTO: 171 10(3)UL (ref 150–450)
POTASSIUM SERPL-SCNC: 4.2 MMOL/L (ref 3.5–5.1)
PROT SERPL-MCNC: 6.1 G/DL (ref 6.4–8.2)
PROT UR STRIP.AUTO-MCNC: NEGATIVE MG/DL
RBC # BLD AUTO: 3.5 X10(6)UL
RBC UR QL AUTO: NEGATIVE
SARS-COV-2 RNA RESP QL NAA+PROBE: NOT DETECTED
SODIUM SERPL-SCNC: 141 MMOL/L (ref 136–145)
SP GR UR STRIP.AUTO: 1.01 (ref 1–1.03)
TROPONIN I HIGH SENSITIVITY: 27 NG/L
UROBILINOGEN UR STRIP.AUTO-MCNC: 0.2 MG/DL
WBC # BLD AUTO: 5.7 X10(3) UL (ref 4–11)

## 2022-09-24 PROCEDURE — 84484 ASSAY OF TROPONIN QUANT: CPT | Performed by: EMERGENCY MEDICINE

## 2022-09-24 PROCEDURE — 81003 URINALYSIS AUTO W/O SCOPE: CPT | Performed by: EMERGENCY MEDICINE

## 2022-09-24 PROCEDURE — 83605 ASSAY OF LACTIC ACID: CPT | Performed by: EMERGENCY MEDICINE

## 2022-09-24 PROCEDURE — 93005 ELECTROCARDIOGRAM TRACING: CPT

## 2022-09-24 PROCEDURE — 93010 ELECTROCARDIOGRAM REPORT: CPT

## 2022-09-24 PROCEDURE — 85025 COMPLETE CBC W/AUTO DIFF WBC: CPT | Performed by: EMERGENCY MEDICINE

## 2022-09-24 PROCEDURE — 70450 CT HEAD/BRAIN W/O DYE: CPT | Performed by: EMERGENCY MEDICINE

## 2022-09-24 PROCEDURE — 99285 EMERGENCY DEPT VISIT HI MDM: CPT

## 2022-09-24 PROCEDURE — 71045 X-RAY EXAM CHEST 1 VIEW: CPT | Performed by: EMERGENCY MEDICINE

## 2022-09-24 PROCEDURE — 36415 COLL VENOUS BLD VENIPUNCTURE: CPT

## 2022-09-24 PROCEDURE — 96360 HYDRATION IV INFUSION INIT: CPT

## 2022-09-24 PROCEDURE — 87040 BLOOD CULTURE FOR BACTERIA: CPT | Performed by: EMERGENCY MEDICINE

## 2022-09-24 PROCEDURE — 80053 COMPREHEN METABOLIC PANEL: CPT | Performed by: EMERGENCY MEDICINE

## 2022-09-24 RX ORDER — HYDROCODONE BITARTRATE AND ACETAMINOPHEN 5; 325 MG/1; MG/1
1 TABLET ORAL 2 TIMES DAILY
COMMUNITY

## 2022-09-24 NOTE — ED INITIAL ASSESSMENT (HPI)
Per pt's daughter, about 1 hour ago pt was speaking and not making sense, also was wheezing per daughter. Pt also began sliding out of the chair and came right to the hospital. Pt's symptoms have resolved at this time, pt has no complaints at this time. This has happened in the past and pt was diagnosed with dehydration/pneumonia. Family also thinks pt has been incontinent lately.

## 2022-09-25 LAB
ATRIAL RATE: 67 BPM
P AXIS: 64 DEGREES
P-R INTERVAL: 178 MS
Q-T INTERVAL: 396 MS
QRS DURATION: 84 MS
QTC CALCULATION (BEZET): 418 MS
R AXIS: 3 DEGREES
T AXIS: 17 DEGREES
VENTRICULAR RATE: 67 BPM

## 2023-01-23 DIAGNOSIS — C4A.71 MERKEL CELL CARCINOMA OF RIGHT LOWER EXTREMITY (HCC): Primary | ICD-10-CM

## 2023-01-25 ENCOUNTER — OFFICE VISIT (OUTPATIENT)
Dept: SURGERY | Facility: CLINIC | Age: 88
End: 2023-01-25
Payer: MEDICARE

## 2023-01-25 ENCOUNTER — LAB ENCOUNTER (OUTPATIENT)
Dept: LAB | Facility: HOSPITAL | Age: 88
End: 2023-01-25
Attending: SURGERY
Payer: MEDICARE

## 2023-01-25 VITALS
HEART RATE: 82 BPM | TEMPERATURE: 99 F | RESPIRATION RATE: 16 BRPM | SYSTOLIC BLOOD PRESSURE: 99 MMHG | DIASTOLIC BLOOD PRESSURE: 60 MMHG

## 2023-01-25 DIAGNOSIS — C4A.61 MERKEL CELL CARCINOMA OF RIGHT UPPER EXTREMITY (HCC): Primary | ICD-10-CM

## 2023-01-25 DIAGNOSIS — Z01.818 PRE-OP TESTING: ICD-10-CM

## 2023-01-25 DIAGNOSIS — C4A.9 MERKEL CELL CARCINOMA (HCC): Primary | ICD-10-CM

## 2023-01-25 DIAGNOSIS — C4A.61 MERKEL CELL CARCINOMA OF UPPER EXTREMITY INCLUDING SHOULDER, RIGHT (HCC): ICD-10-CM

## 2023-01-25 PROCEDURE — 99203 OFFICE O/P NEW LOW 30 MIN: CPT | Performed by: SURGERY

## 2023-01-25 PROCEDURE — 99205 OFFICE O/P NEW HI 60 MIN: CPT | Performed by: SURGERY

## 2023-01-25 NOTE — PATIENT INSTRUCTIONS
Surgeon:              Dr. Mahamed Bedolla. Sayra Prince, PhD                                          Tel:         [phone]                                  Fax:        [phone]    Surgery/Procedure: Right forearm reconstruction with local tissue rearrangement, possible integra, possible skin graft, 1.25hrs, anesthesia per Michell, joint with Salti, outpatient                               2nd stage: 3 weeks later: Right forearm reconstruction with skin graft, 1.5 hours, general anesthesia, outpatient                 Hospital:  [facility]: [address] [place] [address], [place], [address]           [phone]  [facility]: [address], [facility]               [phone]    1. Someone will need to drive you to and from the hospital if your procedure is outpatient. 2.Do not drink alcohol or smoke 24 hours prior to your procedure. 3. Bring a picture ID and your insurance card. 4. You will be contacted by the hospital the day before to confirm the procedure time and location. 5. The hospital will also contact you approximately one week before surgery to schedule your COVID test.     6. Do not take any herbal supplements or blood thinners at least one week before your procedure/surgery. This includes NSAID's (aspirin, baby aspirin, Motrin, Ibuprofen, Aleve, Advil, Naproxen, etc), Plavix, fish oil, vitamin E, turmeric, CoQ10, or green tea supplements, etc. *TYLENOL or acetaminophen is ok to take*    7. PRE-OPERATIVE TESTING: If scheduled under general anesthesia:  History and physical with medical clearance is REQUIRED within 30 days of the surgery date and is mandatory per Dr. Sayra Prince. *If this is not done, your surgery will be postponed*  [address] [place] [address]   CBC  CMP  EKG    8. Please inform us if you develop any Covid-19 like symptoms, test positive or have been exposed for Covid- 19 prior to surgery.      Consent obtained 01/25/2023  Photos taken on 01/25/2023

## 2023-01-25 NOTE — PATIENT INSTRUCTIONS
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 Use the provided Chlorhexadine surgical soap(instructions attached) to shower the night before and morning of your procedure. Do not apply powders, creams, lotions or deodorant after showering. Do not apply any kind of makeup and make sure to remove nail polish prior to your surgery. For faster recovery from anesthesia and surgery please follow the instructions below regarding your pre-op diet:  12 hours prior to your surgery time you are to drink one 10oz bottle of Ensure Pre-Surgery Drink. You are to have NO solid food or water after 11pm the night before your surgery EXCEPT one additional 10oz bottle of Ensure Pre-Surgery Drink. You need to finish drinking this 4 hours prior to surgery time. Take Tylenol 1000mg by mouth at the time of your second Ensure Pre-Surgery drink(4hrs prior to surgery time), unless instructed otherwise by your surgeon. Bowel Prep: No   If you take Insulin contact your primary care physician for specific instructions regarding dosing around your surgery. Do not drink alcohol or smoke tobacco products 24 hours prior to procedure. Bring your picture ID and insurance card with you. Wear comfortable clothing that can easily be removed. Preferably, something that zips, snaps, or buttons up the front. You will be contacted by the hospital  for pre-admission COVID-19 testing and the day prior to your surgery to confirm details and give you specific instructions about when and where to arrive the day of your procedure.    If you are taking blood thinners including: Plavix, Eliquis, Xarelto, Coumadin, full strength Aspirin you will need to contact the prescribing provider for specific instructions on holding these medications for your procedure. Inform your primary care physician of your surgery and ask if him/her will need to see you prior to surgery. If you develop symptoms of another illness prior to surgery please contact our office immediately. If this is an inpatient surgery, attending the Surgical Oncology Pre-operative Education Class is strongly encouraged. Email [email or web address] to RSVP or for more class information. Pre-Operative Testing  x CBC x CMP  BMP    PT, PTT, INR  UA x EKG    Chest X-Ray x Cardiac Clearance with anticoagulation hold  [facility] P Medical Clearance     HIPEC:     Research Consent  CEA, ,          [facility] ANNA MARIE Atkins MD, [facility]  Chief of Surgical Oncology  Co-Medical Director, Estela Granger  Professor of Surgery    Dr. Joe Stovall nurse line: [phone]   Monday through Friday 8:00am to 4:30pm.     For Dr. Joe Stovall office: [phone]/ Fax: [phone]  After hours you will reach the answering service    Pre-Admission Testing:  Letty Alcala 187 [phone]  Medical Records:   [phone]

## 2023-01-26 PROCEDURE — 88342 IMHCHEM/IMCYTCHM 1ST ANTB: CPT

## 2023-01-26 PROCEDURE — 88341 IMHCHEM/IMCYTCHM EA ADD ANTB: CPT

## 2023-01-26 PROCEDURE — 88321 CONSLTJ&REPRT SLD PREP ELSWR: CPT

## 2023-02-06 ENCOUNTER — TELEPHONE (OUTPATIENT)
Dept: SURGERY | Facility: CLINIC | Age: 88
End: 2023-02-06

## 2023-02-06 DIAGNOSIS — C4A.61 MERKEL CELL CARCINOMA OF RIGHT UPPER EXTREMITY (HCC): Primary | ICD-10-CM

## 2023-02-06 NOTE — TELEPHONE ENCOUNTER
Calling to discuss surgery scheduling for patient, Vale oMntenegro Spoke with Bryan Olguinjohnnymillie, patient's daughter, who agreed to 03/21/2023 at the [facility] with Dr. Valdemar Hoang and Dr. Queenie Lee. Bryan Dominguez inquired as to whether any additional pre-operative testing or appointments are needed, I let her know I would speak with Dr. Mora Standing nurse, and someone would reach out if anything is missing. Thanked, nothing further needed.

## 2023-03-14 ENCOUNTER — NURSE ONLY (OUTPATIENT)
Dept: LAB | Age: 88
End: 2023-03-14
Attending: SURGERY
Payer: MEDICARE

## 2023-03-14 ENCOUNTER — LABORATORY ENCOUNTER (OUTPATIENT)
Dept: LAB | Age: 88
End: 2023-03-14
Attending: SURGERY
Payer: MEDICARE

## 2023-03-14 DIAGNOSIS — Z01.818 PRE-OP TESTING: ICD-10-CM

## 2023-03-14 LAB
ALBUMIN SERPL-MCNC: 3.5 G/DL (ref 3.4–5)
ALBUMIN/GLOB SERPL: 1.1 {RATIO} (ref 1–2)
ALP LIVER SERPL-CCNC: 84 U/L
ALT SERPL-CCNC: 18 U/L
ANION GAP SERPL CALC-SCNC: 6 MMOL/L (ref 0–18)
AST SERPL-CCNC: 17 U/L (ref 15–37)
BASOPHILS # BLD AUTO: 0.02 X10(3) UL (ref 0–0.2)
BASOPHILS NFR BLD AUTO: 0.3 %
BILIRUB SERPL-MCNC: 0.4 MG/DL (ref 0.1–2)
BUN BLD-MCNC: 23 MG/DL (ref 7–18)
CALCIUM BLD-MCNC: 8.8 MG/DL (ref 8.5–10.1)
CHLORIDE SERPL-SCNC: 109 MMOL/L (ref 98–112)
CO2 SERPL-SCNC: 29 MMOL/L (ref 21–32)
CREAT BLD-MCNC: 1.12 MG/DL
EOSINOPHIL # BLD AUTO: 0.06 X10(3) UL (ref 0–0.7)
EOSINOPHIL NFR BLD AUTO: 1 %
ERYTHROCYTE [DISTWIDTH] IN BLOOD BY AUTOMATED COUNT: 13.1 %
FASTING STATUS PATIENT QL REPORTED: NO
GFR SERPLBLD BASED ON 1.73 SQ M-ARVRAT: 62 ML/MIN/1.73M2 (ref 60–?)
GLOBULIN PLAS-MCNC: 3.1 G/DL (ref 2.8–4.4)
GLUCOSE BLD-MCNC: 117 MG/DL (ref 70–99)
HCT VFR BLD AUTO: 37.1 %
HGB BLD-MCNC: 12 G/DL
IMM GRANULOCYTES # BLD AUTO: 0.02 X10(3) UL (ref 0–1)
IMM GRANULOCYTES NFR BLD: 0.3 %
LYMPHOCYTES # BLD AUTO: 1.31 X10(3) UL (ref 1–4)
LYMPHOCYTES NFR BLD AUTO: 22.5 %
MCH RBC QN AUTO: 32.2 PG (ref 26–34)
MCHC RBC AUTO-ENTMCNC: 32.3 G/DL (ref 31–37)
MCV RBC AUTO: 99.5 FL
MONOCYTES # BLD AUTO: 0.63 X10(3) UL (ref 0.1–1)
MONOCYTES NFR BLD AUTO: 10.8 %
NEUTROPHILS # BLD AUTO: 3.79 X10 (3) UL (ref 1.5–7.7)
NEUTROPHILS # BLD AUTO: 3.79 X10(3) UL (ref 1.5–7.7)
NEUTROPHILS NFR BLD AUTO: 65.1 %
OSMOLALITY SERPL CALC.SUM OF ELEC: 303 MOSM/KG (ref 275–295)
PLATELET # BLD AUTO: 188 10(3)UL (ref 150–450)
POTASSIUM SERPL-SCNC: 4 MMOL/L (ref 3.5–5.1)
PROT SERPL-MCNC: 6.6 G/DL (ref 6.4–8.2)
RBC # BLD AUTO: 3.73 X10(6)UL
SODIUM SERPL-SCNC: 144 MMOL/L (ref 136–145)
WBC # BLD AUTO: 5.8 X10(3) UL (ref 4–11)

## 2023-03-14 PROCEDURE — 36415 COLL VENOUS BLD VENIPUNCTURE: CPT

## 2023-03-14 PROCEDURE — 80053 COMPREHEN METABOLIC PANEL: CPT

## 2023-03-14 PROCEDURE — 85025 COMPLETE CBC W/AUTO DIFF WBC: CPT

## 2023-03-18 ENCOUNTER — LAB ENCOUNTER (OUTPATIENT)
Dept: LAB | Age: 88
End: 2023-03-18
Attending: SURGERY
Payer: MEDICARE

## 2023-03-18 DIAGNOSIS — Z01.818 PRE-OP TESTING: ICD-10-CM

## 2023-03-19 LAB — SARS-COV-2 RNA RESP QL NAA+PROBE: NOT DETECTED

## 2023-03-20 ENCOUNTER — ANESTHESIA EVENT (OUTPATIENT)
Dept: SURGERY | Facility: HOSPITAL | Age: 88
End: 2023-03-20
Payer: MEDICARE

## 2023-03-21 ENCOUNTER — HOSPITAL ENCOUNTER (OUTPATIENT)
Dept: NUCLEAR MEDICINE | Facility: HOSPITAL | Age: 88
Discharge: HOME | End: 2023-03-21
Attending: SURGERY
Payer: MEDICARE

## 2023-03-21 ENCOUNTER — HOSPITAL ENCOUNTER (OUTPATIENT)
Facility: HOSPITAL | Age: 88
Setting detail: HOSPITAL OUTPATIENT SURGERY
Discharge: HOME | End: 2023-03-21
Attending: SURGERY | Admitting: SURGERY
Payer: MEDICARE

## 2023-03-21 ENCOUNTER — ANESTHESIA (OUTPATIENT)
Dept: SURGERY | Facility: HOSPITAL | Age: 88
End: 2023-03-21
Payer: MEDICARE

## 2023-03-21 VITALS
TEMPERATURE: 98 F | WEIGHT: 149 LBS | RESPIRATION RATE: 16 BRPM | HEIGHT: 60 IN | BODY MASS INDEX: 29.25 KG/M2 | DIASTOLIC BLOOD PRESSURE: 85 MMHG | HEART RATE: 60 BPM | OXYGEN SATURATION: 92 % | SYSTOLIC BLOOD PRESSURE: 142 MMHG

## 2023-03-21 DIAGNOSIS — C4A.61 MERKEL CELL CARCINOMA OF RIGHT UPPER EXTREMITY (HCC): ICD-10-CM

## 2023-03-21 DIAGNOSIS — Z01.818 PRE-OP TESTING: Primary | ICD-10-CM

## 2023-03-21 DIAGNOSIS — C4A.61 MERKEL CELL CARCINOMA OF UPPER EXTREMITY INCLUDING SHOULDER, RIGHT (HCC): ICD-10-CM

## 2023-03-21 PROCEDURE — 07B50ZX EXCISION OF RIGHT AXILLARY LYMPHATIC, OPEN APPROACH, DIAGNOSTIC: ICD-10-PCS | Performed by: SURGERY

## 2023-03-21 PROCEDURE — 0JBG0ZX EXCISION OF RIGHT LOWER ARM SUBCUTANEOUS TISSUE AND FASCIA, OPEN APPROACH, DIAGNOSTIC: ICD-10-PCS | Performed by: SURGERY

## 2023-03-21 PROCEDURE — 88307 TISSUE EXAM BY PATHOLOGIST: CPT | Performed by: SURGERY

## 2023-03-21 PROCEDURE — 0JQG0ZZ REPAIR RIGHT LOWER ARM SUBCUTANEOUS TISSUE AND FASCIA, OPEN APPROACH: ICD-10-PCS | Performed by: SURGERY

## 2023-03-21 PROCEDURE — 0JBG0ZZ EXCISION OF RIGHT LOWER ARM SUBCUTANEOUS TISSUE AND FASCIA, OPEN APPROACH: ICD-10-PCS | Performed by: SURGERY

## 2023-03-21 PROCEDURE — 0JBD0ZZ EXCISION OF RIGHT UPPER ARM SUBCUTANEOUS TISSUE AND FASCIA, OPEN APPROACH: ICD-10-PCS | Performed by: SURGERY

## 2023-03-21 PROCEDURE — 78195 LYMPH SYSTEM IMAGING: CPT | Performed by: SURGERY

## 2023-03-21 PROCEDURE — 88342 IMHCHEM/IMCYTCHM 1ST ANTB: CPT | Performed by: SURGERY

## 2023-03-21 PROCEDURE — 0JQD0ZZ REPAIR RIGHT UPPER ARM SUBCUTANEOUS TISSUE AND FASCIA, OPEN APPROACH: ICD-10-PCS | Performed by: SURGERY

## 2023-03-21 PROCEDURE — 88305 TISSUE EXAM BY PATHOLOGIST: CPT | Performed by: SURGERY

## 2023-03-21 PROCEDURE — 0JRG07Z REPLACEMENT OF RIGHT LOWER ARM SUBCUTANEOUS TISSUE AND FASCIA WITH AUTOLOGOUS TISSUE SUBSTITUTE, OPEN APPROACH: ICD-10-PCS | Performed by: SURGERY

## 2023-03-21 PROCEDURE — 88304 TISSUE EXAM BY PATHOLOGIST: CPT | Performed by: SURGERY

## 2023-03-21 RX ORDER — BUPIVACAINE HYDROCHLORIDE 2.5 MG/ML
INJECTION, SOLUTION EPIDURAL; INFILTRATION; INTRACAUDAL AS NEEDED
Status: DISCONTINUED | OUTPATIENT
Start: 2023-03-21 | End: 2023-03-21 | Stop reason: HOSPADM

## 2023-03-21 RX ORDER — PHENYLEPHRINE HCL 10 MG/ML
VIAL (ML) INJECTION AS NEEDED
Status: DISCONTINUED | OUTPATIENT
Start: 2023-03-21 | End: 2023-03-21 | Stop reason: SURG

## 2023-03-21 RX ORDER — ACETAMINOPHEN 500 MG
1000 TABLET ORAL ONCE
Status: DISCONTINUED | OUTPATIENT
Start: 2023-03-21 | End: 2023-03-21 | Stop reason: HOSPADM

## 2023-03-21 RX ORDER — LIDOCAINE AND PRILOCAINE 25; 25 MG/G; MG/G
CREAM TOPICAL ONCE
Status: COMPLETED | OUTPATIENT
Start: 2023-03-21 | End: 2023-03-21

## 2023-03-21 RX ORDER — CEFAZOLIN SODIUM/WATER 2 G/20 ML
2 SYRINGE (ML) INTRAVENOUS ONCE
Status: COMPLETED | OUTPATIENT
Start: 2023-03-21 | End: 2023-03-21

## 2023-03-21 RX ORDER — SODIUM CHLORIDE, SODIUM LACTATE, POTASSIUM CHLORIDE, CALCIUM CHLORIDE 600; 310; 30; 20 MG/100ML; MG/100ML; MG/100ML; MG/100ML
INJECTION, SOLUTION INTRAVENOUS CONTINUOUS
Status: DISCONTINUED | OUTPATIENT
Start: 2023-03-21 | End: 2023-03-21

## 2023-03-21 RX ORDER — TRAMADOL HYDROCHLORIDE 50 MG/1
50 TABLET ORAL EVERY 6 HOURS PRN
Qty: 15 TABLET | Refills: 0 | Status: SHIPPED | OUTPATIENT
Start: 2023-03-21

## 2023-03-21 RX ORDER — ACETAMINOPHEN 500 MG
1000 TABLET ORAL ONCE AS NEEDED
Status: DISCONTINUED | OUTPATIENT
Start: 2023-03-21 | End: 2023-03-21

## 2023-03-21 RX ORDER — LIDOCAINE HYDROCHLORIDE 10 MG/ML
INJECTION, SOLUTION EPIDURAL; INFILTRATION; INTRACAUDAL; PERINEURAL AS NEEDED
Status: DISCONTINUED | OUTPATIENT
Start: 2023-03-21 | End: 2023-03-21 | Stop reason: SURG

## 2023-03-21 RX ORDER — ONDANSETRON 2 MG/ML
4 INJECTION INTRAMUSCULAR; INTRAVENOUS EVERY 6 HOURS PRN
Status: DISCONTINUED | OUTPATIENT
Start: 2023-03-21 | End: 2023-03-21

## 2023-03-21 RX ORDER — DIAZEPAM 5 MG/1
5 TABLET ORAL AS NEEDED
Status: DISCONTINUED | OUTPATIENT
Start: 2023-03-21 | End: 2023-03-21 | Stop reason: HOSPADM

## 2023-03-21 RX ORDER — HYDROMORPHONE HYDROCHLORIDE 1 MG/ML
0.4 INJECTION, SOLUTION INTRAMUSCULAR; INTRAVENOUS; SUBCUTANEOUS EVERY 5 MIN PRN
Status: DISCONTINUED | OUTPATIENT
Start: 2023-03-21 | End: 2023-03-21

## 2023-03-21 RX ORDER — EPHEDRINE SULFATE 50 MG/ML
INJECTION INTRAVENOUS AS NEEDED
Status: DISCONTINUED | OUTPATIENT
Start: 2023-03-21 | End: 2023-03-21 | Stop reason: SURG

## 2023-03-21 RX ORDER — NALOXONE HYDROCHLORIDE 0.4 MG/ML
80 INJECTION, SOLUTION INTRAMUSCULAR; INTRAVENOUS; SUBCUTANEOUS AS NEEDED
Status: DISCONTINUED | OUTPATIENT
Start: 2023-03-21 | End: 2023-03-21

## 2023-03-21 RX ORDER — HYDROMORPHONE HYDROCHLORIDE 1 MG/ML
0.2 INJECTION, SOLUTION INTRAMUSCULAR; INTRAVENOUS; SUBCUTANEOUS EVERY 5 MIN PRN
Status: DISCONTINUED | OUTPATIENT
Start: 2023-03-21 | End: 2023-03-21

## 2023-03-21 RX ADMIN — EPHEDRINE SULFATE 15 MG: 50 INJECTION INTRAVENOUS at 11:39:00

## 2023-03-21 RX ADMIN — SODIUM CHLORIDE, SODIUM LACTATE, POTASSIUM CHLORIDE, CALCIUM CHLORIDE: 600; 310; 30; 20 INJECTION, SOLUTION INTRAVENOUS at 11:17:00

## 2023-03-21 RX ADMIN — EPHEDRINE SULFATE 15 MG: 50 INJECTION INTRAVENOUS at 11:32:00

## 2023-03-21 RX ADMIN — PHENYLEPHRINE HCL 100 MCG: 10 MG/ML VIAL (ML) INJECTION at 11:49:00

## 2023-03-21 RX ADMIN — SODIUM CHLORIDE, SODIUM LACTATE, POTASSIUM CHLORIDE, CALCIUM CHLORIDE: 600; 310; 30; 20 INJECTION, SOLUTION INTRAVENOUS at 13:14:00

## 2023-03-21 RX ADMIN — LIDOCAINE HYDROCHLORIDE 50 MG: 10 INJECTION, SOLUTION EPIDURAL; INFILTRATION; INTRACAUDAL; PERINEURAL at 11:22:00

## 2023-03-21 RX ADMIN — EPHEDRINE SULFATE 10 MG: 50 INJECTION INTRAVENOUS at 11:49:00

## 2023-03-21 RX ADMIN — CEFAZOLIN SODIUM/WATER 2 G: 2 G/20 ML SYRINGE (ML) INTRAVENOUS at 11:28:00

## 2023-03-21 NOTE — BRIEF OP NOTE
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

## 2023-03-21 NOTE — BRIEF OP NOTE
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

## 2023-03-21 NOTE — ANESTHESIA PROCEDURE NOTES
Airway  Date/Time: 3/21/2023 11:24 AM  Urgency: elective      General Information and Staff    Patient location during procedure: OR  Anesthesiologist: Jose Villar MD  Resident/CRNA: Bill Gleason CRNA  Performed: CRNA   Performed by: Bill Gleason CRNA  Authorized by: Jose Villar MD      Indications and Patient Condition  Indications for airway management: anesthesia  Sedation level: deep  Preoxygenated: yes  Patient position: sniffing  Mask difficulty assessment: 1 - vent by mask    Final Airway Details  Final airway type: supraglottic airway      Successful airway: classic  Size 4       Number of attempts at approach: 1

## 2023-03-21 NOTE — ANESTHESIA POSTPROCEDURE EVALUATION
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 Josh Hamilton) Right forearm reconstruction with local tissue rearrangement, possible integra, possible skin graft (Dr. 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 Josh Hamilton) Right forearm reconstruction with local tissue rearrangement, possible integra, possible skin graft (Dr. Mel Avelar) (Right)      . 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 Vitals Value Taken Time   /59 03/21/23 1335   Temp 98.2 03/21/23 1335   Pulse 76 03/21/23 1335   Resp 20 03/21/23 1335   SpO2 95% 03/21/23 1335       Patient Location: PACU    Anesthesia Type: general    Airway Patency: patent    Postop Pain Control: adequate    Mental Status: mildly sedated but able to meaningfully participate in the post-anesthesia evaluation    Nausea/Vomiting: none    Cardiopulmonary/Hydration status: stable euvolemic    Complications: no apparent anesthesia related complications    Postop vital signs: stable    Dental Exam: Unchanged from Preop    Patient to be discharged from PACU when criteria met. check mouth frequently for oral residue/pocketing/small sips/bites/crush medication (when feasible)/maintain upright posture during/after eating for 30 mins small sips/bites/check mouth frequently for oral residue/pocketing/crush medication (when feasible)/maintain upright posture during/after eating for 30 mins

## 2023-03-21 NOTE — DISCHARGE INSTRUCTIONS
Plastic Surgery Home Care Instructions      We hope you were pleased with your care at [facility].  We wish you the best outcome and overall experience with your operation. These instructions will help to minimize pain, optimize healing, and improve the likelihood of a successful result. What To Expect  There may be some spotting of the incision lines for the next few days. Mild bruising, mild swelling and discomfort in the surgical area is typical.   Temporary areas of numbness are typical in the early weeks following your procedure. Normalization of sensation can typically take up to several months following the operation. Bandages (Dressing)  Keep dressings clean and dry  Wear ace wrap at all times. You can adjust this as needed. Leave steri-strips on armpit in place. If these fall off on their own, apply antibiotic ointment daily (neosporin, bacitracin etc)    Bathing/Showers  DO NOT get surgical areas wet  No baths, swimming, or hot tubs until you receive medical permission    Pain Medication: Tramadol  See instructions on prescription  Do not take narcotics, if you do not have pain. Keep stools soft. Take a stool softener (Metamucil, Milk of Magnesia, Colace). Eating fruit will also help to prevent constipation    Over-The-Counter Medication  You can take Tylenol after surgery for pain relief as needed  Take as directed on the bottle    Home Medication  Resume your home medications as instructed  Do not resume herbal medications for two weeks  Ok to restart your Eliquis at your next dose    Diet  Resume your normal diet    Activity  No strenuous activity   No lifting with right arm  Elevate arm above heart level as much as possible  You cannot return to physical exercise, sports, or gym workouts until you are allowed to participate in strenuous activity. Driving  Do not drive, if you are taking pain medication.     Return to Work or [employer] can return to work when you are not taking pain medication, if your work does not involve strenuous activity. Contact the office, if you need a medical note. Follow-up Appointment with Bear York PA-C on 3/27/2023 and with Dr. Clovis Harris on 4/5/2023  Our office will call you to set up a time    Questions or Concerns  Call Dr. Candido Mccarty office if you experience severe pain not controlled by pain medication, swelling, numbness, tingling, bleeding, fever, or other concerns. If she is not available, another physician will be able to address your concerns. Matt Padilla   Thank you for coming to [facility] for your operation. The nurses and the anesthesiologist try very hard to make sure you receive the best care possible. Your trust in them is greatly appreciated.     Thanks so much,  Dr. Nayla Steward PA-C

## 2023-03-22 ENCOUNTER — TELEPHONE (OUTPATIENT)
Dept: SURGERY | Facility: CLINIC | Age: 88
End: 2023-03-22

## 2023-03-22 NOTE — OPERATIVE REPORT
[facility]    PATIENT'S NAME: Katerina [facility]   ATTENDING PHYSICIAN: Giselle Bar MD   OPERATING PHYSICIAN: July Prince MD   PATIENT ACCOUNT#:   [id number]    LOCATION:  [facility] PRE [address] [place] [address] 10  MEDICAL RECORD #:   [id number]       YOB: 1931  ADMISSION DATE:       03/21/2023      OPERATION DATE:  03/21/2023    OPERATIVE REPORT    PREOPERATIVE DIAGNOSIS:  Merkel cell cancer, right forearm; open wound, right forearm and right axilla. POSTOPERATIVE DIAGNOSIS:  Merkel cell cancer, right forearm; open wound, right forearm and right axilla. PROCEDURE:  1. Right forearm reconstruction with full-thickness skin graft from right axilla, 30 sq cm. 2.   Partial complex layered wound closure, forearm, 4 cm. 3.   Complex layered closure, right axilla, 9 cm. ASSISTANT:  Jaki Pena PA-C. ANESTHESIA:  General endotracheal anesthesia. COMPLICATIONS:  None. BLOOD LOSS:  Minimal.    INDICATIONS:  This is a 80-year-old gentleman with a Merkel cell cancer on his forearm who was seen by Dr. Saul Bar to discuss wide local excision and sentinel lymph node biopsy. He saw me to discuss reconstructive options. We discussed options for local tissue rearrangement, complex closure, partial complex closure, and skin graft. Potential risks, complications, benefits and alternatives were discussed. Risks and complications include, but are not limited to, infection, bleeding, scarring, damage to surrounding structures, hematoma, seroma, graft failure, flap failure, need for further surgery. The patient understands and wishes to proceed. Questions were answered. OPERATIVE TECHNIQUE:  Patient was identified in the preoperative area by Dr. Saul Bar and brought to the operating room. I was called into the room after he had completed his portion. At that time, there was an open wound on his right forearm measuring 10 x 7 cm. There was also an open wound on the right axilla.   I evaluated the tissue laxity, and the patient had some tissue laxity on the forearm. Therefore, a partial complex layered closure was performed on the superior aspect, and this was done with 3-0 interrupted Vicryl sutures for the deep dermal layer and 5-0 Prolene baseball sutures. The area was 4 cm partially closed. Then, the full-thickness skin graft was harvested from the right axilla. An area of 30 sq cm was harvested, tie clasp drainage wounds were placed, and then this was secured to the wound bed with 4-0 chromic sutures circumferentially and at the base. Then, Xeroform bolster was applied and secured with 2-0 silk sutures. Then, the axilla was closed with a complex layered closure with 3-0 interrupted Vicryl sutures for the deep dermal layer and 4-0 Monocryl subcuticular sutures for the skin. Dermabond was applied, and Steri-Strips were applied. Then, Kerlix was applied and Ace bandages were applied to the forearm and arm. The patient tolerated the procedure well and awoke from anesthesia without difficulty. All sponge, instrument, and needle counts were correct at the end of the case. Dictated By Fabio Kidd MD  d: 03/21/2023 17:25:31  t: 03/21/2023 22:13:58  [facility] [id number]  [facility]/

## 2023-03-22 NOTE — OPERATIVE REPORT
[address]    PATIENT'S NAME: Myrtle Dutta   ATTENDING PHYSICIAN: Lele Quintero. Valdemar Hoang MD   OPERATING PHYSICIAN: Lele Quintero. Valdemar Hoang MD   PATIENT ACCOUNT#:   [id number]    LOCATION:  [facility] [address] 10  MEDICAL RECORD #:   [id number]       YOB: 1931  ADMISSION DATE:       03/21/2023      OPERATION DATE:  03/21/2023    OPERATIVE REPORT      PREOPERATIVE DIAGNOSIS:  Merkel cell carcinoma, right forearm. POSTOPERATIVE DIAGNOSIS:    1.   Merkel cell carcinoma, right forearm. 2.   Pending final pathology report. PROCEDURE:    1. Wide excision of Merkel cell carcinoma, right forearm (7 cm). 2.   [place] lymph node biopsy, right axilla. ASSISTANT:  Devika Iqbal PA-C     ANESTHESIA:  General.    INDICATIONS:  The patient is a 80-year-old man who was diagnosed with Merkel cell carcinoma of the right forearm. He presents today for wide excision and sentinel lymph node biopsy. On this date, patient presented to the nuclear medicine department where he underwent injection of radiolabeled colloid, followed by lymphoscintigraphy showing uptake within the right axilla. OPERATIVE TECHNIQUE:  The patient was brought to the operating room and was placed in supine position. He was given general anesthesia by the anesthesiology service. Sequential compression boots were placed. Patient received preoperative intravenous antibiotic prophylaxis. He was prepped and draped in normal sterile fashion. A right axilla incision overlying hot transcutaneous site was made and deepened. I encountered a slightly unremarkable and enlarged lymph node to about 1.5 cm. It had increased uptake by the gamma probe to with an in vivo count of 1118/10 seconds. It was dissected free of surrounding tissue and had an ex vivo count of 1886/10 seconds. It was sent to Pathology for permanent section evaluation. Background count was 5.   There was no evidence for any other enlarged lymph nodes or any other nodes with increased uptake by the gamma probe. Next, a 2 cm margin was marked around the biopsy site in the right forearm. An elliptical incision measuring 7 x 8 cm was made and deepened to underlying muscle fascia. Underlying vein was preserved with its tributaries. The specimen was excised, oriented, and sent to Pathology. Hemostasis was achieved and maintained. At this point, Dr. Amy Stuart from plastic surgery presented for reconstruction, and her portion of the dictation should be noted elsewhere. The patient tolerated my portion of the procedure well without immediate complications. Dictated By Keshia Perez MD  d: 03/21/2023 16:28:39  t: 03/21/2023 21:03:12  [employer] [id number]  [facility]/

## 2023-03-22 NOTE — TELEPHONE ENCOUNTER
Called to check on patient post operatively. Pt daughter stated he was having some minimal pain that was relieved with the tramadol. Pt surgical sites intact and pt has no fevers or nausea. Confirmed post op appointment for Monday.

## 2023-03-27 ENCOUNTER — OFFICE VISIT (OUTPATIENT)
Dept: SURGERY | Facility: CLINIC | Age: 88
End: 2023-03-27
Payer: MEDICARE

## 2023-03-27 DIAGNOSIS — C4A.61 MERKEL CELL CARCINOMA OF UPPER EXTREMITY INCLUDING SHOULDER, RIGHT (HCC): Primary | ICD-10-CM

## 2023-03-27 DIAGNOSIS — C7B.1 METASTATIC MERKEL CELL CARCINOMA TO LYMPH NODE (HCC): ICD-10-CM

## 2023-03-27 PROCEDURE — 99024 POSTOP FOLLOW-UP VISIT: CPT | Performed by: PHYSICIAN ASSISTANT

## 2023-03-27 NOTE — PROGRESS NOTES
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 Ayleen Rodríguez) and right forearm reconstruction with full-thickness skin graft from right axilla, partial complex layered wound closure forearm, complex layered closure right axilla (Dr. Cheryl Callaway) on 3/21/2023. He denies fever and chills. He denies nausea, vomiting, diarrhea or constipation. His pain is controlled. Physical Exam     Right forearm Xeroform bolster removed. Skin graft adherent. No wound drainage or surrounding erythema. Partial complex layered wound closure site with Prolene sutures in place. Incision clean, dry and intact without wound drainage or wound dehiscence. He does have some swelling of the hand and forearm but no erythema. He does have some skin irritation at the suture sites. Right axillary incision clean, dry and intact without wound drainage or wound dehiscence. Minimal irritation underneath the Steri-Strips. No erythema. There were no vitals filed for this visit. 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 Ayleen Rodríguez) and right forearm reconstruction with full-thickness skin graft from right axilla, partial complex layered wound closure forearm, complex layered closure right axilla (Dr. Cheryl Callaway) on 3/21/2023. Xeroform bolster was removed today. A new dressing of Neosporin, Xeroform, Telfa, Kerlix, Ace wrap was placed from the hand to the axilla. Neosporin ointment was placed over the axillary incision. He will change his dressing daily. He should still not get the surgical site wet. I stressed the importance of elevation and continued activity restrictions with the right arm. He was instructed to call with any questions, concerns, worsening symptoms.   He will follow-up with Dr. Cheryl Callaway in 1 week.    Questions were answered. Patient understands.      Andrew Skaggs  3/27/2023  12:32 PM

## 2023-03-28 ENCOUNTER — TELEPHONE (OUTPATIENT)
Dept: SURGERY | Facility: CLINIC | Age: 88
End: 2023-03-28

## 2023-03-28 PROBLEM — C7B.1: Status: ACTIVE | Noted: 2023-03-28

## 2023-03-28 NOTE — TELEPHONE ENCOUNTER
Informed by PAT that patients daughter is concerned that surgery is still scheduled on 4/11/23 for skin graft. LVM to discuss this with patient.

## 2023-03-29 ENCOUNTER — TELEPHONE (OUTPATIENT)
Dept: SURGERY | Facility: CLINIC | Age: 88
End: 2023-03-29

## 2023-03-29 DIAGNOSIS — C4A.61 MERKEL CELL CARCINOMA OF UPPER EXTREMITY INCLUDING SHOULDER, RIGHT (HCC): Primary | ICD-10-CM

## 2023-03-29 DIAGNOSIS — C7B.1 METASTATIC MERKEL CELL CARCINOMA TO LYMPH NODE (HCC): ICD-10-CM

## 2023-03-29 NOTE — TELEPHONE ENCOUNTER
Spoke to patient's daughter Bhavik Ray to discuss tumor board recommendations. Recommend PET/CT and consult with radiation oncology. Family will call to schedule.

## 2023-04-03 ENCOUNTER — HOSPITAL ENCOUNTER (OUTPATIENT)
Dept: NUCLEAR MEDICINE | Facility: HOSPITAL | Age: 88
Discharge: HOME | End: 2023-04-03
Attending: PHYSICIAN ASSISTANT
Payer: MEDICARE

## 2023-04-03 DIAGNOSIS — C7B.1 METASTATIC MERKEL CELL CARCINOMA TO LYMPH NODE (HCC): ICD-10-CM

## 2023-04-03 DIAGNOSIS — C4A.61 MERKEL CELL CARCINOMA OF UPPER EXTREMITY INCLUDING SHOULDER, RIGHT (HCC): ICD-10-CM

## 2023-04-03 LAB — GLUCOSE BLD-MCNC: 103 MG/DL (ref 70–99)

## 2023-04-03 PROCEDURE — 82962 GLUCOSE BLOOD TEST: CPT

## 2023-04-03 PROCEDURE — 78815 PET IMAGE W/CT SKULL-THIGH: CPT | Performed by: PHYSICIAN ASSISTANT

## 2023-04-04 ENCOUNTER — HOSPITAL ENCOUNTER (OUTPATIENT)
Dept: RADIATION ONCOLOGY | Facility: HOSPITAL | Age: 88
Discharge: HOME | End: 2023-04-04
Attending: INTERNAL MEDICINE
Payer: MEDICARE

## 2023-04-04 VITALS
WEIGHT: 152 LBS | TEMPERATURE: 98 F | OXYGEN SATURATION: 94 % | RESPIRATION RATE: 18 BRPM | HEART RATE: 73 BPM | DIASTOLIC BLOOD PRESSURE: 70 MMHG | BODY MASS INDEX: 30 KG/M2 | SYSTOLIC BLOOD PRESSURE: 118 MMHG

## 2023-04-04 DIAGNOSIS — C4A.61 MERKEL CELL CARCINOMA OF UPPER EXTREMITY INCLUDING SHOULDER, RIGHT (HCC): ICD-10-CM

## 2023-04-04 PROCEDURE — 99214 OFFICE O/P EST MOD 30 MIN: CPT

## 2023-04-05 ENCOUNTER — OFFICE VISIT (OUTPATIENT)
Dept: SURGERY | Facility: CLINIC | Age: 88
End: 2023-04-05
Payer: MEDICARE

## 2023-04-05 DIAGNOSIS — C4A.61 MERKEL CELL CARCINOMA OF UPPER EXTREMITY INCLUDING SHOULDER, RIGHT (HCC): Primary | ICD-10-CM

## 2023-04-05 PROCEDURE — 99024 POSTOP FOLLOW-UP VISIT: CPT | Performed by: SURGERY

## 2023-04-05 NOTE — PROGRESS NOTES
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 Sarah Nascimento) and right forearm reconstruction with full-thickness skin graft from right axilla, partial complex layered wound closure forearm, complex layered closure right axilla (Dr. Jenni Bacon) on 3/21/2023. He denies fever and chills. He denies nausea, vomiting, diarrhea or constipation. His pain is controlled. Physical Exam     Right forearm skin graft with 2 x 1 cm dehisced skin graft with exposed muscle and granulation tissue at the base. The remainder of the skin graft is adherent. The area of skin graft dehiscence was cleaned and gently debrided. Partial complex layered wound closure site clean, dry and intact with Prolene sutures in place. Right axillary incision clean, dry and intact. No erythema or wound drainage. There were no vitals filed for this visit. 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 Sarah Nascimento) and right forearm reconstruction with full-thickness skin graft from right axilla, partial complex layered wound closure forearm, complex layered closure right axilla (Dr. Jenni Bacon) on 3/21/2023    The area of skin graft dehiscence was gently cleaned and debrided today. A dressing of Neosporin, Xeroform, Telfa, Ace wrap was applied. He will change this daily with mupirocin ointment. Prolene sutures were removed today. He should continue to limit elbow range of motion, continue with activity restrictions, compression and elevation. He will follow up next week with our nurse and in 2 weeks with me for recheck. Questions were answered. Patient understands.

## 2023-04-06 ENCOUNTER — TUMOR BOARD CONFERENCE (OUTPATIENT)
Dept: SURGERY | Facility: CLINIC | Age: 88
End: 2023-04-06

## 2023-04-06 DIAGNOSIS — M89.9 BONE LESION: Primary | ICD-10-CM

## 2023-04-06 DIAGNOSIS — R94.8 ABNORMAL POSITRON EMISSION TOMOGRAPHY (PET) SCAN: ICD-10-CM

## 2023-04-06 DIAGNOSIS — Z85.46 HISTORY OF PROSTATE CANCER: Primary | ICD-10-CM

## 2023-04-06 NOTE — TELEPHONE ENCOUNTER
Called patient to review TB recommendations regarding PET/CT. Plan for PSA. LVM with call back number. Paulina Chavarria returned call. She will have her dad obtain his PSA. Follow-up pending labs.

## 2023-04-08 ENCOUNTER — LAB ENCOUNTER (OUTPATIENT)
Dept: LAB | Age: 88
End: 2023-04-08
Attending: PHYSICIAN ASSISTANT
Payer: MEDICARE

## 2023-04-08 DIAGNOSIS — Z85.46 HISTORY OF PROSTATE CANCER: ICD-10-CM

## 2023-04-08 DIAGNOSIS — R94.8 ABNORMAL POSITRON EMISSION TOMOGRAPHY (PET) SCAN: ICD-10-CM

## 2023-04-08 LAB — PSA SERPL-MCNC: 0.08 NG/ML (ref ?–4)

## 2023-04-08 PROCEDURE — 36415 COLL VENOUS BLD VENIPUNCTURE: CPT

## 2023-04-08 PROCEDURE — 84153 ASSAY OF PSA TOTAL: CPT

## 2023-04-10 ENCOUNTER — TELEPHONE (OUTPATIENT)
Dept: SURGERY | Facility: CLINIC | Age: 88
End: 2023-04-10

## 2023-04-10 NOTE — TELEPHONE ENCOUNTER
Spoke to patient's daughter let her know PSA was normal.   He has a bone scanned schedule. They know to follow-up with Dr. Johnny Anderson one month after completion of radiation.

## 2023-04-12 ENCOUNTER — HOSPITAL ENCOUNTER (OUTPATIENT)
Dept: NUCLEAR MEDICINE | Facility: HOSPITAL | Age: 88
Discharge: HOME | End: 2023-04-12
Attending: INTERNAL MEDICINE
Payer: MEDICARE

## 2023-04-12 DIAGNOSIS — M89.9 BONE LESION: ICD-10-CM

## 2023-04-12 PROCEDURE — 78306 BONE IMAGING WHOLE BODY: CPT | Performed by: INTERNAL MEDICINE

## 2023-04-12 PROCEDURE — 78832 RP LOCLZJ TUM SPECT W/CT 2: CPT | Performed by: INTERNAL MEDICINE

## 2023-04-13 ENCOUNTER — NURSE ONLY (OUTPATIENT)
Dept: SURGERY | Facility: CLINIC | Age: 88
End: 2023-04-13
Payer: MEDICARE

## 2023-04-13 DIAGNOSIS — C4A.61 MERKEL CELL CARCINOMA OF UPPER EXTREMITY INCLUDING SHOULDER, RIGHT (HCC): Primary | ICD-10-CM

## 2023-04-13 PROCEDURE — 99024 POSTOP FOLLOW-UP VISIT: CPT | Performed by: SURGERY

## 2023-04-16 ENCOUNTER — HOSPITAL ENCOUNTER (EMERGENCY)
Facility: HOSPITAL | Age: 88
Discharge: HOME | End: 2023-04-16
Attending: EMERGENCY MEDICINE
Payer: MEDICARE

## 2023-04-16 VITALS
TEMPERATURE: 97 F | SYSTOLIC BLOOD PRESSURE: 137 MMHG | RESPIRATION RATE: 18 BRPM | HEART RATE: 58 BPM | DIASTOLIC BLOOD PRESSURE: 74 MMHG | BODY MASS INDEX: 30 KG/M2 | OXYGEN SATURATION: 97 % | WEIGHT: 152 LBS

## 2023-04-16 DIAGNOSIS — T14.8XXA BLEEDING FROM WOUND: Primary | ICD-10-CM

## 2023-04-16 PROCEDURE — 99283 EMERGENCY DEPT VISIT LOW MDM: CPT

## 2023-04-16 PROCEDURE — 99284 EMERGENCY DEPT VISIT MOD MDM: CPT

## 2023-04-16 RX ORDER — CEPHALEXIN 500 MG/1
500 CAPSULE ORAL 4 TIMES DAILY
Qty: 28 CAPSULE | Refills: 0 | Status: SHIPPED | OUTPATIENT
Start: 2023-04-16 | End: 2023-04-23

## 2023-04-16 NOTE — ED INITIAL ASSESSMENT (HPI)
89YM c/c of post op bleeding Pt state that he had previous skin graft that started bleeding again tonight Pt is blood thinner

## 2023-04-19 ENCOUNTER — OFFICE VISIT (OUTPATIENT)
Dept: SURGERY | Facility: CLINIC | Age: 88
End: 2023-04-19
Payer: MEDICARE

## 2023-04-19 DIAGNOSIS — C4A.61 MERKEL CELL CARCINOMA OF UPPER EXTREMITY INCLUDING SHOULDER, RIGHT (HCC): Primary | ICD-10-CM

## 2023-04-19 PROCEDURE — 99024 POSTOP FOLLOW-UP VISIT: CPT | Performed by: SURGERY

## 2023-04-20 ENCOUNTER — HOSPITAL ENCOUNTER (OUTPATIENT)
Dept: RADIATION ONCOLOGY | Facility: HOSPITAL | Age: 88
End: 2023-04-20
Attending: INTERNAL MEDICINE
Payer: MEDICARE

## 2023-04-20 ENCOUNTER — OFFICE VISIT (OUTPATIENT)
Dept: WOUND CARE | Facility: HOSPITAL | Age: 88
End: 2023-04-20
Attending: FAMILY MEDICINE
Payer: MEDICARE

## 2023-04-20 VITALS
RESPIRATION RATE: 17 BRPM | WEIGHT: 152 LBS | BODY MASS INDEX: 29.84 KG/M2 | HEART RATE: 64 BPM | DIASTOLIC BLOOD PRESSURE: 77 MMHG | HEIGHT: 60 IN | TEMPERATURE: 98 F | SYSTOLIC BLOOD PRESSURE: 158 MMHG

## 2023-04-20 DIAGNOSIS — C4A.61 MERKEL CELL CARCINOMA OF UPPER EXTREMITY INCLUDING SHOULDER, RIGHT (HCC): Primary | ICD-10-CM

## 2023-04-20 DIAGNOSIS — L92.9 EXCESSIVE GRANULATION TISSUE: ICD-10-CM

## 2023-04-20 DIAGNOSIS — T81.31XD DISRUPTION OF EXTERNAL OPERATION (SURGICAL) WOUND, NOT ELSEWHERE CLASSIFIED, SUBSEQUENT ENCOUNTER: ICD-10-CM

## 2023-04-20 PROCEDURE — 11042 DBRDMT SUBQ TIS 1ST 20SQCM/<: CPT | Performed by: NURSE PRACTITIONER

## 2023-04-20 NOTE — PATIENT INSTRUCTIONS
Please return: Wednesday with Estelle      Patient discharge and wound care instructions  Janice Schwab  4/20/2023     You may shower and cleanse area with mild soap and water, rinse wound with saline or wound cleanser, dab dry with gauze and apply your dressings as directed. Changing your dressing:            every other-every 3rd day  Wash your hands with soap and water. Remove old dressing, discard into plastic bag and place into trash. Cleanse the wound with Normal Saline or specified wound cleanser prior to applying a clean dressing using gauze sponges, not tissues or cotton balls. Pat dry using gauze sponges, not tissue or cotton balls. Bleeding is ok. APPLY Dressing:    honey gel to the \"yellow\" areas, cover all 3 areas with the blue ready dressing and secure    Continue the spandagrip sleeve    Nutrition and blood sugar control:  Focus on the following:  Protein: Meats, beans, eggs, milk and yogurt particularly [place] yogurt), tofu, soy nuts, soy protein products (Follow the protein handout in your welcome folder)  Vitamin C: Citrus fruits and juices, strawberries, tomatoes, tomato juice, peppers, baked potatoes, spinach, broccoli, cauliflower, [place] sprouts, cabbage  Vitamin A: Dark green, leafy vegetables, orange or yellow vegetables, cantaloupe, fortified dairy products, liver, fortified cereals  Zinc: Fortified cereals, red meats, seafood  Consider supplementing with Jah by [employer] (These are essential branch chain amino acids that help with tissue building and wound healing) and take 2 packets/day. you can order online at [employer] or [facility]  When your blood sugar is consistently elevated greater than 180 your body can't heal or fight infection.      Concerns:  Signs of infection may include the following:  Increase in redness  Red \"streaks\" from wound  Increase in swelling  Fever  Unusual odor  Change in the amount of wound drainage     Should you experience any significant changes in your wound(s) or have any questions regarding your home care instructions please contact the wound center [facility] @ [phone] If after regular business hours, please call your family doctor or local emergency room. The treatment plan has been discussed at length between you and your provider. Follow all instructions carefully, it is very important. If you do not follow all instructions you are at risk of your wound not healing, infection, possible loss of limb and even loss of life.

## 2023-04-20 NOTE — PROGRESS NOTES
Patient ID: Armandina Schwab is a 80year old male. Debridement   Wound 04/20/23 #1 Right Forearm Old surgical Arm Lower;Right    Consent obtained? verbal  Consent given by: patient  Risks discussed?  procedural risks discussed  Performed by: provider  Debridement type: surgical  Level of debridement: subcutaneous tissue    Pre-debridement measurements  Length (cm): 6.1  Width (cm): 1.8  Surface Area (cm^2): 10.98      Post-debridement measurements  Length (cm): 6.1  Width (cm): 1.8  Depth (cm): 0.1  Percent debrided: 50%  Surface Area (cm^2): 10.98  Area debrided (cm^2): 5.49  Volume (cm^3): 1.1  Tissue and other material debrided: hypergranulation  Devitalized tissue debrided: biofilm and slough  Instrument(s) utilized: scissors  Bleeding: medium  Hemostasis obtained with: silver nitrate and pressure  Procedural pain (0-10): 0  Post-procedural pain: 0   Response to treatment: procedure was tolerated well

## 2023-04-20 NOTE — PROGRESS NOTES
.Weekly Wound Education Note    Teaching Provided To: Patient; Family  Training Topics: Discharge instructions;Dressing;Cleasing and general instructions  Training Method: Explain/Verbal;Written  Training Response: Patient responds and understands        Notes: Inital visit for Right arm wound. Start honey gel to sloughy areas, cover whole wound with hydrofera ready, secured with sample silicone tape. Spandagrip E applied to arm. Extra dressing sent with patient.

## 2023-04-26 ENCOUNTER — OFFICE VISIT (OUTPATIENT)
Dept: WOUND CARE | Facility: HOSPITAL | Age: 88
End: 2023-04-26
Attending: NURSE PRACTITIONER
Payer: MEDICARE

## 2023-04-26 VITALS
SYSTOLIC BLOOD PRESSURE: 106 MMHG | DIASTOLIC BLOOD PRESSURE: 60 MMHG | HEART RATE: 84 BPM | TEMPERATURE: 99 F | RESPIRATION RATE: 16 BRPM

## 2023-04-26 DIAGNOSIS — T81.31XD DISRUPTION OF EXTERNAL OPERATION (SURGICAL) WOUND, NOT ELSEWHERE CLASSIFIED, SUBSEQUENT ENCOUNTER: ICD-10-CM

## 2023-04-26 DIAGNOSIS — C4A.61 MERKEL CELL CARCINOMA OF UPPER EXTREMITY INCLUDING SHOULDER, RIGHT (HCC): Primary | ICD-10-CM

## 2023-04-26 PROCEDURE — 97597 DBRDMT OPN WND 1ST 20 CM/<: CPT | Performed by: NURSE PRACTITIONER

## 2023-04-26 NOTE — PROGRESS NOTES
.Weekly Wound Education Note    Teaching Provided To: Patient; Family  Training Topics: Discharge instructions;Dressing;Cleasing and general instructions; Compression;Edema control  Training Method: Explain/Verbal;Written  Training Response: Patient responds and understands; Reinforcement needed           Honey gel to most inferior portion of wound, cover both with hydrofera ready, silicone tape. Spanda  size D to arm.

## 2023-04-26 NOTE — PROGRESS NOTES
Patient ID: Edward Nielson is a 80year old male.     Debridement   Wound 04/20/23 #1 Right Forearm Old surgical Arm Lower;Right    Consent obtained? verbal  Consent given by: patient    Performed by: provider  Debridement type: selective  Pain control: lidocaine 4%  Pre-debridement measurements  Length (cm): 3.6  Width (cm): 1.6  Depth (cm): 0.1  Surface Area (cm^2): 5.76  Volume (cm^3): 0.58    Post-debridement measurements  Length (cm): 3.6  Width (cm): 1.6  Depth (cm): 0.1  Percent debrided: 30%  Surface Area (cm^2): 5.76  Area debrided (cm^2): 1.73  Volume (cm^3): 0.58  Devitalized tissue debrided: biofilm and slough  Instrument(s) utilized: blade and forceps  Bleeding: small  Hemostasis obtained with: not applicable  Procedural pain (0-10): 0  Post-procedural pain: 0   Response to treatment: procedure was tolerated well

## 2023-04-26 NOTE — PATIENT INSTRUCTIONS
Please return: Thursday or Friday with [facility]      Patient discharge and wound care instructions  Amie Christianson  4/26/2023      You may shower and cleanse area with mild soap and water, rinse wound with saline or wound cleanser, dab dry with gauze and apply your dressings as directed. Changing your dressing:            every other-every 3rd day  Wash your hands with soap and water. Remove old dressing, discard into plastic bag and place into trash. Cleanse the wound with Normal Saline or specified wound cleanser prior to applying a clean dressing using gauze sponges, not tissues or cotton balls. Pat dry using gauze sponges, not tissue or cotton balls. Bleeding is ok. APPLY Dressing:    honey gel to the \"yellow\" areas, cover all  areas with the blue ready dressing and secure    Continue the spandagrip sleeve    Nutrition and blood sugar control:  Focus on the following:  Protein: Meats, beans, eggs, milk and yogurt particularly [place] yogurt), tofu, soy nuts, soy protein products (Follow the protein handout in your welcome folder)  Vitamin C: Citrus fruits and juices, strawberries, tomatoes, tomato juice, peppers, baked potatoes, spinach, broccoli, cauliflower, [place] sprouts, cabbage  Vitamin A: Dark green, leafy vegetables, orange or yellow vegetables, cantaloupe, fortified dairy products, liver, fortified cereals  Zinc: Fortified cereals, red meats, seafood  Consider supplementing with Jah by [employer] (These are essential branch chain amino acids that help with tissue building and wound healing) and take 2 packets/day. you can order online at [employer] or [facility]  When your blood sugar is consistently elevated greater than 180 your body can't heal or fight infection.      Concerns:  Signs of infection may include the following:  Increase in redness  Red \"streaks\" from wound  Increase in swelling  Fever  Unusual odor  Change in the amount of wound drainage     Should you experience any significant changes in your wound(s) or have any questions regarding your home care instructions please contact the wound center [facility] @ [phone] If after regular business hours, please call your family doctor or local emergency room. The treatment plan has been discussed at length between you and your provider. Follow all instructions carefully, it is very important. If you do not follow all instructions you are at risk of your wound not healing, infection, possible loss of limb and even loss of life.

## 2023-05-04 ENCOUNTER — OFFICE VISIT (OUTPATIENT)
Dept: WOUND CARE | Facility: HOSPITAL | Age: 88
End: 2023-05-04
Attending: NURSE PRACTITIONER
Payer: MEDICARE

## 2023-05-04 VITALS
HEART RATE: 65 BPM | RESPIRATION RATE: 16 BRPM | TEMPERATURE: 99 F | DIASTOLIC BLOOD PRESSURE: 66 MMHG | SYSTOLIC BLOOD PRESSURE: 139 MMHG

## 2023-05-04 DIAGNOSIS — C4A.61 MERKEL CELL CARCINOMA OF UPPER EXTREMITY INCLUDING SHOULDER, RIGHT (HCC): ICD-10-CM

## 2023-05-04 DIAGNOSIS — T81.31XD DISRUPTION OF EXTERNAL OPERATION (SURGICAL) WOUND, NOT ELSEWHERE CLASSIFIED, SUBSEQUENT ENCOUNTER: Primary | ICD-10-CM

## 2023-05-04 PROCEDURE — 99213 OFFICE O/P EST LOW 20 MIN: CPT | Performed by: NURSE PRACTITIONER

## 2023-05-04 NOTE — PATIENT INSTRUCTIONS
Please return:  1 week      Patient discharge and wound care instructions  Darreld Severin  5/4/2023        You may shower and cleanse area with mild soap and water, rinse wound with saline or wound cleanser, dab dry with gauze and apply your dressings as directed. Changing your dressing:            every other-every 3rd day  Wash your hands with soap and water. Remove old dressing, discard into plastic bag and place into trash. Cleanse the wound with Normal Saline or specified wound cleanser prior to applying a clean dressing using gauze sponges, not tissues or cotton balls. Pat dry using gauze sponges, not tissue or cotton balls. Bleeding is ok. APPLY Dressing:    cover all  areas with the blue ready dressing and secure    Continue the spandagrip sleeve    Nutrition and blood sugar control:  Focus on the following:  Protein: Meats, beans, eggs, milk and yogurt particularly [place] yogurt), tofu, soy nuts, soy protein products (Follow the protein handout in your welcome folder)  Vitamin C: Citrus fruits and juices, strawberries, tomatoes, tomato juice, peppers, baked potatoes, spinach, broccoli, cauliflower, [place] sprouts, cabbage  Vitamin A: Dark green, leafy vegetables, orange or yellow vegetables, cantaloupe, fortified dairy products, liver, fortified cereals  Zinc: Fortified cereals, red meats, seafood  Consider supplementing with Jah by [employer] (These are essential branch chain amino acids that help with tissue building and wound healing) and take 2 packets/day. you can order online at [employer] or [facility]  When your blood sugar is consistently elevated greater than 180 your body can't heal or fight infection.      Concerns:  Signs of infection may include the following:  Increase in redness  Red \"streaks\" from wound  Increase in swelling  Fever  Unusual odor  Change in the amount of wound drainage     Should you experience any significant changes in your wound(s) or have any questions regarding your home care instructions please contact the wound center [facility] @ [phone] If after regular business hours, please call your family doctor or local emergency room. The treatment plan has been discussed at length between you and your provider. Follow all instructions carefully, it is very important. If you do not follow all instructions you are at risk of your wound not healing, infection, possible loss of limb and even loss of life.

## 2023-05-04 NOTE — PROGRESS NOTES
.Weekly Wound Education Note    Teaching Provided To: Patient; Family  Training Topics: Discharge instructions;Dressing;Cleasing and general instructions  Training Method: Explain/Verbal;Written  Training Response: Patient responds and understands        Notes: Wound improving. Denia and hydrofera ready and silicone tape applied to wound. Spandagrip D over arm. no, its @ home

## 2023-05-10 ENCOUNTER — OFFICE VISIT (OUTPATIENT)
Dept: SURGERY | Facility: CLINIC | Age: 88
End: 2023-05-10
Payer: MEDICARE

## 2023-05-10 DIAGNOSIS — C4A.61 MERKEL CELL CARCINOMA OF UPPER EXTREMITY INCLUDING SHOULDER, RIGHT (HCC): Primary | ICD-10-CM

## 2023-05-10 PROCEDURE — 1160F RVW MEDS BY RX/DR IN RCRD: CPT | Performed by: SURGERY

## 2023-05-10 PROCEDURE — 99024 POSTOP FOLLOW-UP VISIT: CPT | Performed by: SURGERY

## 2023-05-10 PROCEDURE — 1159F MED LIST DOCD IN RCRD: CPT | Performed by: SURGERY

## 2023-05-11 ENCOUNTER — OFFICE VISIT (OUTPATIENT)
Dept: WOUND CARE | Facility: HOSPITAL | Age: 88
End: 2023-05-11
Attending: NURSE PRACTITIONER
Payer: MEDICARE

## 2023-05-11 VITALS
DIASTOLIC BLOOD PRESSURE: 65 MMHG | SYSTOLIC BLOOD PRESSURE: 106 MMHG | HEART RATE: 60 BPM | TEMPERATURE: 97 F | RESPIRATION RATE: 16 BRPM

## 2023-05-11 DIAGNOSIS — T81.31XD DISRUPTION OF EXTERNAL OPERATION (SURGICAL) WOUND, NOT ELSEWHERE CLASSIFIED, SUBSEQUENT ENCOUNTER: Primary | ICD-10-CM

## 2023-05-11 DIAGNOSIS — C4A.61 MERKEL CELL CARCINOMA OF UPPER EXTREMITY INCLUDING SHOULDER, RIGHT (HCC): ICD-10-CM

## 2023-05-11 PROCEDURE — 99212 OFFICE O/P EST SF 10 MIN: CPT | Performed by: NURSE PRACTITIONER

## 2023-05-11 NOTE — PROGRESS NOTES
.Weekly Wound Education Note    Teaching Provided To: Patient; Family  Training Topics: Discharge instructions  Training Method: Explain/Verbal;Written  Training Response: Patient responds and understands        Notes: Per provider, wound is healed. Pt discharged from clinic.

## 2023-05-11 NOTE — PATIENT INSTRUCTIONS
Patient discharge and wound care instructions  Shankar Salazar  5/11/2023     Discharge from clinic    Moisturize area with aquaphor, cetaphil, vanicream    Return as needed.

## 2023-05-16 ENCOUNTER — HOSPITAL ENCOUNTER (OUTPATIENT)
Dept: RADIATION ONCOLOGY | Facility: HOSPITAL | Age: 88
Discharge: HOME | End: 2023-05-16
Attending: INTERNAL MEDICINE
Payer: MEDICARE

## 2023-05-16 PROCEDURE — 77334 RADIATION TREATMENT AID(S): CPT | Performed by: INTERNAL MEDICINE

## 2023-05-16 PROCEDURE — 77290 THER RAD SIMULAJ FIELD CPLX: CPT | Performed by: INTERNAL MEDICINE

## 2023-05-17 PROCEDURE — 77470 SPECIAL RADIATION TREATMENT: CPT | Performed by: INTERNAL MEDICINE

## 2023-05-24 PROCEDURE — 77295 3-D RADIOTHERAPY PLAN: CPT | Performed by: INTERNAL MEDICINE

## 2023-05-24 PROCEDURE — 77334 RADIATION TREATMENT AID(S): CPT | Performed by: INTERNAL MEDICINE

## 2023-05-24 PROCEDURE — 77300 RADIATION THERAPY DOSE PLAN: CPT | Performed by: INTERNAL MEDICINE

## 2023-06-01 ENCOUNTER — TELEPHONE (OUTPATIENT)
Dept: HEMATOLOGY/ONCOLOGY | Facility: HOSPITAL | Age: 88
End: 2023-06-01

## 2023-06-06 ENCOUNTER — HOSPITAL ENCOUNTER (OUTPATIENT)
Dept: RADIATION ONCOLOGY | Facility: HOSPITAL | Age: 88
Discharge: HOME | End: 2023-06-06
Attending: INTERNAL MEDICINE
Payer: MEDICARE

## 2023-06-06 PROCEDURE — 77280 THER RAD SIMULAJ FIELD SMPL: CPT | Performed by: INTERNAL MEDICINE

## 2023-06-06 PROCEDURE — 77412 RADIATION TX DELIVERY LVL 3: CPT | Performed by: INTERNAL MEDICINE

## 2023-06-08 PROCEDURE — 77412 RADIATION TX DELIVERY LVL 3: CPT | Performed by: INTERNAL MEDICINE

## 2023-06-08 PROCEDURE — 77331 SPECIAL RADIATION DOSIMETRY: CPT | Performed by: INTERNAL MEDICINE

## 2023-06-08 PROCEDURE — 77387 GUIDANCE FOR RADJ TX DLVR: CPT | Performed by: INTERNAL MEDICINE

## 2023-06-09 ENCOUNTER — HOSPITAL ENCOUNTER (OUTPATIENT)
Dept: RADIATION ONCOLOGY | Facility: HOSPITAL | Age: 88
Discharge: HOME | End: 2023-06-09
Attending: INTERNAL MEDICINE
Payer: MEDICARE

## 2023-06-09 VITALS
DIASTOLIC BLOOD PRESSURE: 66 MMHG | OXYGEN SATURATION: 96 % | TEMPERATURE: 99 F | HEART RATE: 65 BPM | RESPIRATION RATE: 18 BRPM | SYSTOLIC BLOOD PRESSURE: 116 MMHG

## 2023-06-09 DIAGNOSIS — C4A.61 MERKEL CELL CARCINOMA OF UPPER EXTREMITY INCLUDING SHOULDER, RIGHT (HCC): Primary | ICD-10-CM

## 2023-06-09 PROCEDURE — 77336 RADIATION PHYSICS CONSULT: CPT | Performed by: INTERNAL MEDICINE

## 2023-06-09 PROCEDURE — 77412 RADIATION TX DELIVERY LVL 3: CPT | Performed by: INTERNAL MEDICINE

## 2023-06-09 PROCEDURE — 77387 GUIDANCE FOR RADJ TX DLVR: CPT | Performed by: INTERNAL MEDICINE

## 2023-06-13 PROCEDURE — 77387 GUIDANCE FOR RADJ TX DLVR: CPT | Performed by: INTERNAL MEDICINE

## 2023-06-13 PROCEDURE — 77412 RADIATION TX DELIVERY LVL 3: CPT | Performed by: INTERNAL MEDICINE

## 2023-06-14 PROCEDURE — 77412 RADIATION TX DELIVERY LVL 3: CPT | Performed by: INTERNAL MEDICINE

## 2023-06-14 PROCEDURE — 77387 GUIDANCE FOR RADJ TX DLVR: CPT | Performed by: INTERNAL MEDICINE

## 2023-06-15 PROCEDURE — 77387 GUIDANCE FOR RADJ TX DLVR: CPT | Performed by: INTERNAL MEDICINE

## 2023-06-15 PROCEDURE — 77412 RADIATION TX DELIVERY LVL 3: CPT | Performed by: INTERNAL MEDICINE

## 2023-06-16 ENCOUNTER — HOSPITAL ENCOUNTER (OUTPATIENT)
Dept: RADIATION ONCOLOGY | Facility: HOSPITAL | Age: 88
Discharge: HOME | End: 2023-06-16
Attending: INTERNAL MEDICINE
Payer: MEDICARE

## 2023-06-16 VITALS
SYSTOLIC BLOOD PRESSURE: 100 MMHG | RESPIRATION RATE: 18 BRPM | OXYGEN SATURATION: 95 % | DIASTOLIC BLOOD PRESSURE: 54 MMHG | TEMPERATURE: 98 F | HEART RATE: 59 BPM

## 2023-06-16 DIAGNOSIS — C4A.61 MERKEL CELL CARCINOMA OF UPPER EXTREMITY INCLUDING SHOULDER, RIGHT (HCC): Primary | ICD-10-CM

## 2023-06-16 PROCEDURE — 77387 GUIDANCE FOR RADJ TX DLVR: CPT | Performed by: INTERNAL MEDICINE

## 2023-06-16 PROCEDURE — 77336 RADIATION PHYSICS CONSULT: CPT | Performed by: INTERNAL MEDICINE

## 2023-06-16 PROCEDURE — 77412 RADIATION TX DELIVERY LVL 3: CPT | Performed by: INTERNAL MEDICINE

## 2023-06-19 PROCEDURE — 77387 GUIDANCE FOR RADJ TX DLVR: CPT | Performed by: INTERNAL MEDICINE

## 2023-06-19 PROCEDURE — 77412 RADIATION TX DELIVERY LVL 3: CPT | Performed by: INTERNAL MEDICINE

## 2023-06-20 PROCEDURE — 77412 RADIATION TX DELIVERY LVL 3: CPT | Performed by: INTERNAL MEDICINE

## 2023-06-20 PROCEDURE — 77387 GUIDANCE FOR RADJ TX DLVR: CPT | Performed by: INTERNAL MEDICINE

## 2023-06-21 PROCEDURE — 77412 RADIATION TX DELIVERY LVL 3: CPT | Performed by: INTERNAL MEDICINE

## 2023-06-21 PROCEDURE — 77387 GUIDANCE FOR RADJ TX DLVR: CPT | Performed by: INTERNAL MEDICINE

## 2023-06-22 PROCEDURE — 77412 RADIATION TX DELIVERY LVL 3: CPT | Performed by: INTERNAL MEDICINE

## 2023-06-22 PROCEDURE — 77387 GUIDANCE FOR RADJ TX DLVR: CPT | Performed by: INTERNAL MEDICINE

## 2023-06-23 ENCOUNTER — HOSPITAL ENCOUNTER (OUTPATIENT)
Dept: RADIATION ONCOLOGY | Facility: HOSPITAL | Age: 88
Discharge: HOME | End: 2023-06-23
Attending: INTERNAL MEDICINE
Payer: MEDICARE

## 2023-06-23 ENCOUNTER — APPOINTMENT (OUTPATIENT)
Dept: RADIATION ONCOLOGY | Facility: HOSPITAL | Age: 88
End: 2023-06-23
Attending: INTERNAL MEDICINE
Payer: MEDICARE

## 2023-06-23 VITALS
HEART RATE: 60 BPM | DIASTOLIC BLOOD PRESSURE: 61 MMHG | RESPIRATION RATE: 18 BRPM | OXYGEN SATURATION: 95 % | TEMPERATURE: 99 F | SYSTOLIC BLOOD PRESSURE: 101 MMHG

## 2023-06-23 DIAGNOSIS — C4A.61 MERKEL CELL CARCINOMA OF UPPER EXTREMITY INCLUDING SHOULDER, RIGHT (HCC): Primary | ICD-10-CM

## 2023-06-23 PROCEDURE — 77387 GUIDANCE FOR RADJ TX DLVR: CPT | Performed by: INTERNAL MEDICINE

## 2023-06-23 PROCEDURE — 77412 RADIATION TX DELIVERY LVL 3: CPT | Performed by: INTERNAL MEDICINE

## 2023-06-23 PROCEDURE — 77336 RADIATION PHYSICS CONSULT: CPT | Performed by: INTERNAL MEDICINE

## 2023-06-26 PROCEDURE — 77412 RADIATION TX DELIVERY LVL 3: CPT | Performed by: INTERNAL MEDICINE

## 2023-06-26 PROCEDURE — 77387 GUIDANCE FOR RADJ TX DLVR: CPT | Performed by: INTERNAL MEDICINE

## 2023-06-27 PROCEDURE — 77387 GUIDANCE FOR RADJ TX DLVR: CPT | Performed by: INTERNAL MEDICINE

## 2023-06-27 PROCEDURE — 77412 RADIATION TX DELIVERY LVL 3: CPT | Performed by: INTERNAL MEDICINE

## 2023-06-28 PROCEDURE — 77387 GUIDANCE FOR RADJ TX DLVR: CPT | Performed by: INTERNAL MEDICINE

## 2023-06-28 PROCEDURE — 77412 RADIATION TX DELIVERY LVL 3: CPT | Performed by: INTERNAL MEDICINE

## 2023-06-29 PROCEDURE — 77387 GUIDANCE FOR RADJ TX DLVR: CPT | Performed by: INTERNAL MEDICINE

## 2023-06-29 PROCEDURE — 77412 RADIATION TX DELIVERY LVL 3: CPT | Performed by: INTERNAL MEDICINE

## 2023-06-30 ENCOUNTER — APPOINTMENT (OUTPATIENT)
Dept: RADIATION ONCOLOGY | Facility: HOSPITAL | Age: 88
End: 2023-06-30
Attending: INTERNAL MEDICINE
Payer: MEDICARE

## 2023-06-30 ENCOUNTER — HOSPITAL ENCOUNTER (OUTPATIENT)
Dept: RADIATION ONCOLOGY | Facility: HOSPITAL | Age: 88
Discharge: HOME | End: 2023-06-30
Attending: INTERNAL MEDICINE
Payer: MEDICARE

## 2023-06-30 VITALS
SYSTOLIC BLOOD PRESSURE: 108 MMHG | TEMPERATURE: 98 F | DIASTOLIC BLOOD PRESSURE: 60 MMHG | HEART RATE: 52 BPM | OXYGEN SATURATION: 94 % | RESPIRATION RATE: 18 BRPM

## 2023-06-30 PROCEDURE — 77387 GUIDANCE FOR RADJ TX DLVR: CPT | Performed by: INTERNAL MEDICINE

## 2023-06-30 PROCEDURE — 77412 RADIATION TX DELIVERY LVL 3: CPT | Performed by: INTERNAL MEDICINE

## 2023-06-30 PROCEDURE — 77336 RADIATION PHYSICS CONSULT: CPT | Performed by: INTERNAL MEDICINE

## 2023-06-30 RX ORDER — MOMETASONE FUROATE 1 MG/G
CREAM TOPICAL
Qty: 50 G | Refills: 0 | Status: SHIPPED | OUTPATIENT
Start: 2023-06-30

## 2023-07-01 ENCOUNTER — HOSPITAL ENCOUNTER (OUTPATIENT)
Dept: RADIATION ONCOLOGY | Facility: HOSPITAL | Age: 88
Discharge: HOME | End: 2023-07-01
Attending: INTERNAL MEDICINE
Payer: MEDICARE

## 2023-07-03 PROCEDURE — 77412 RADIATION TX DELIVERY LVL 3: CPT | Performed by: INTERNAL MEDICINE

## 2023-07-03 PROCEDURE — 77387 GUIDANCE FOR RADJ TX DLVR: CPT | Performed by: INTERNAL MEDICINE

## 2023-07-05 PROCEDURE — 77412 RADIATION TX DELIVERY LVL 3: CPT | Performed by: INTERNAL MEDICINE

## 2023-07-05 PROCEDURE — 77387 GUIDANCE FOR RADJ TX DLVR: CPT | Performed by: INTERNAL MEDICINE

## 2023-07-06 PROCEDURE — 77412 RADIATION TX DELIVERY LVL 3: CPT | Performed by: INTERNAL MEDICINE

## 2023-07-06 PROCEDURE — 77387 GUIDANCE FOR RADJ TX DLVR: CPT | Performed by: INTERNAL MEDICINE

## 2023-07-07 ENCOUNTER — HOSPITAL ENCOUNTER (OUTPATIENT)
Dept: RADIATION ONCOLOGY | Facility: HOSPITAL | Age: 88
Discharge: HOME | End: 2023-07-07
Attending: INTERNAL MEDICINE
Payer: MEDICARE

## 2023-07-07 VITALS
TEMPERATURE: 98 F | OXYGEN SATURATION: 95 % | SYSTOLIC BLOOD PRESSURE: 95 MMHG | RESPIRATION RATE: 20 BRPM | DIASTOLIC BLOOD PRESSURE: 57 MMHG | HEART RATE: 73 BPM

## 2023-07-07 DIAGNOSIS — C4A.61 MERKEL CELL CARCINOMA OF UPPER EXTREMITY INCLUDING SHOULDER, RIGHT (HCC): Primary | ICD-10-CM

## 2023-07-07 PROCEDURE — 77387 GUIDANCE FOR RADJ TX DLVR: CPT | Performed by: INTERNAL MEDICINE

## 2023-07-07 PROCEDURE — 77336 RADIATION PHYSICS CONSULT: CPT | Performed by: INTERNAL MEDICINE

## 2023-07-07 PROCEDURE — 77412 RADIATION TX DELIVERY LVL 3: CPT | Performed by: INTERNAL MEDICINE

## 2023-07-10 PROCEDURE — 77412 RADIATION TX DELIVERY LVL 3: CPT | Performed by: INTERNAL MEDICINE

## 2023-07-10 PROCEDURE — 77387 GUIDANCE FOR RADJ TX DLVR: CPT | Performed by: INTERNAL MEDICINE

## 2023-07-11 PROCEDURE — 77387 GUIDANCE FOR RADJ TX DLVR: CPT | Performed by: INTERNAL MEDICINE

## 2023-07-11 PROCEDURE — 77412 RADIATION TX DELIVERY LVL 3: CPT | Performed by: INTERNAL MEDICINE

## 2023-07-12 PROCEDURE — 77387 GUIDANCE FOR RADJ TX DLVR: CPT | Performed by: INTERNAL MEDICINE

## 2023-07-12 PROCEDURE — 77412 RADIATION TX DELIVERY LVL 3: CPT | Performed by: INTERNAL MEDICINE

## 2023-07-13 PROCEDURE — 77412 RADIATION TX DELIVERY LVL 3: CPT | Performed by: INTERNAL MEDICINE

## 2023-07-13 PROCEDURE — 77387 GUIDANCE FOR RADJ TX DLVR: CPT | Performed by: INTERNAL MEDICINE

## 2023-07-14 ENCOUNTER — HOSPITAL ENCOUNTER (OUTPATIENT)
Dept: RADIATION ONCOLOGY | Facility: HOSPITAL | Age: 88
Discharge: HOME | End: 2023-07-14
Attending: INTERNAL MEDICINE
Payer: MEDICARE

## 2023-07-14 VITALS
TEMPERATURE: 99 F | OXYGEN SATURATION: 96 % | DIASTOLIC BLOOD PRESSURE: 59 MMHG | HEART RATE: 71 BPM | RESPIRATION RATE: 18 BRPM | SYSTOLIC BLOOD PRESSURE: 106 MMHG

## 2023-07-14 DIAGNOSIS — C4A.61 MERKEL CELL CARCINOMA OF UPPER EXTREMITY INCLUDING SHOULDER, RIGHT (HCC): Primary | ICD-10-CM

## 2023-07-14 PROCEDURE — 77387 GUIDANCE FOR RADJ TX DLVR: CPT | Performed by: INTERNAL MEDICINE

## 2023-07-14 PROCEDURE — 77336 RADIATION PHYSICS CONSULT: CPT | Performed by: INTERNAL MEDICINE

## 2023-07-14 PROCEDURE — 77412 RADIATION TX DELIVERY LVL 3: CPT | Performed by: INTERNAL MEDICINE

## 2023-07-14 RX ORDER — SULFAMETHOXAZOLE AND TRIMETHOPRIM 800; 160 MG/1; MG/1
TABLET ORAL
Qty: 28 TABLET | Refills: 0 | Status: SHIPPED | OUTPATIENT
Start: 2023-07-14

## 2023-07-17 PROCEDURE — 77412 RADIATION TX DELIVERY LVL 3: CPT | Performed by: INTERNAL MEDICINE

## 2023-07-17 PROCEDURE — 77387 GUIDANCE FOR RADJ TX DLVR: CPT | Performed by: INTERNAL MEDICINE

## 2023-07-18 ENCOUNTER — DOCUMENTATION ONLY (OUTPATIENT)
Dept: RADIATION ONCOLOGY | Facility: HOSPITAL | Age: 88
End: 2023-07-18

## 2023-07-18 ENCOUNTER — HOSPITAL ENCOUNTER (OUTPATIENT)
Dept: RADIATION ONCOLOGY | Facility: HOSPITAL | Age: 88
Discharge: HOME | End: 2023-07-18
Attending: INTERNAL MEDICINE
Payer: MEDICARE

## 2023-07-18 VITALS
OXYGEN SATURATION: 97 % | TEMPERATURE: 98 F | DIASTOLIC BLOOD PRESSURE: 63 MMHG | SYSTOLIC BLOOD PRESSURE: 120 MMHG | HEART RATE: 59 BPM | RESPIRATION RATE: 18 BRPM

## 2023-07-18 DIAGNOSIS — C4A.61 MERKEL CELL CARCINOMA OF UPPER EXTREMITY INCLUDING SHOULDER, RIGHT (HCC): Primary | ICD-10-CM

## 2023-07-18 PROCEDURE — 77412 RADIATION TX DELIVERY LVL 3: CPT | Performed by: INTERNAL MEDICINE

## 2023-07-18 PROCEDURE — 77387 GUIDANCE FOR RADJ TX DLVR: CPT | Performed by: INTERNAL MEDICINE

## 2023-07-18 NOTE — PATIENT INSTRUCTIONS
- WE WILL CALL TO SCHEDULE YOUR FOLLOW-UP APPOINTMENT WITH DR. KEANE ON FRIDAY 7/21/23      - CALL [phone] IF YOU HAVE ANY QUESTIONS/CONCERNS REGARDING RADIATION THERAPY

## 2023-07-19 ENCOUNTER — APPOINTMENT (OUTPATIENT)
Dept: RADIATION ONCOLOGY | Facility: HOSPITAL | Age: 88
End: 2023-07-19
Attending: INTERNAL MEDICINE
Payer: MEDICARE

## 2023-07-19 PROCEDURE — 77336 RADIATION PHYSICS CONSULT: CPT | Performed by: INTERNAL MEDICINE

## 2023-07-21 ENCOUNTER — HOSPITAL ENCOUNTER (OUTPATIENT)
Dept: RADIATION ONCOLOGY | Facility: HOSPITAL | Age: 88
Discharge: HOME | End: 2023-07-21
Attending: INTERNAL MEDICINE
Payer: MEDICARE

## 2023-07-21 VITALS
RESPIRATION RATE: 16 BRPM | SYSTOLIC BLOOD PRESSURE: 95 MMHG | DIASTOLIC BLOOD PRESSURE: 57 MMHG | OXYGEN SATURATION: 96 % | TEMPERATURE: 98 F | HEART RATE: 73 BPM

## 2023-07-21 NOTE — PATIENT INSTRUCTIONS
- FOLLOW-UP WITH DR. KEANE ON FRIDAY 7/28 AT 3PM.        - CALL [phone] IF YOU HAVE ANY QUESTIONS/CONCERNS REGARDING RADIATION THERAPY

## 2023-07-24 NOTE — PROGRESS NOTES
Here for skin check. Has been following suggested routine: gentle cleanse with vaseline gauze dressing. Has about 1 more week of abx left. Forearm erythema starting to fade slightly, has re-epithelization of skin. RTC 1 week for further skin check.

## 2023-07-28 ENCOUNTER — HOSPITAL ENCOUNTER (OUTPATIENT)
Dept: RADIATION ONCOLOGY | Facility: HOSPITAL | Age: 88
Discharge: HOME | End: 2023-07-28
Attending: INTERNAL MEDICINE
Payer: MEDICARE

## 2023-07-28 ENCOUNTER — APPOINTMENT (OUTPATIENT)
Dept: RADIATION ONCOLOGY | Facility: HOSPITAL | Age: 88
End: 2023-07-28
Attending: INTERNAL MEDICINE
Payer: MEDICARE

## 2023-07-28 VITALS
SYSTOLIC BLOOD PRESSURE: 92 MMHG | OXYGEN SATURATION: 94 % | DIASTOLIC BLOOD PRESSURE: 50 MMHG | HEART RATE: 57 BPM | RESPIRATION RATE: 18 BRPM | TEMPERATURE: 98 F

## 2023-07-28 DIAGNOSIS — C4A.61 MERKEL CELL CARCINOMA OF UPPER EXTREMITY INCLUDING SHOULDER, RIGHT (HCC): Primary | ICD-10-CM

## 2023-07-28 PROCEDURE — 99213 OFFICE O/P EST LOW 20 MIN: CPT

## 2023-07-28 RX ORDER — SULFAMETHOXAZOLE AND TRIMETHOPRIM 800; 160 MG/1; MG/1
TABLET ORAL
Qty: 10 TABLET | Refills: 0 | Status: SHIPPED | OUTPATIENT
Start: 2023-07-28

## 2023-07-28 NOTE — PATIENT INSTRUCTIONS
- WE WILL CALL TO SCHEDULE YOUR FOLLOW-UP APPOINTMENT WITH DR. KEANE IN 1 MONTH    - CALL [phone] IF YOU HAVE ANY QUESTIONS/CONCERNS REGARDING RADIATION THERAPY

## 2023-07-29 NOTE — PROGRESS NOTES
Here for short follow-up  Skin dry but healing  1 more day of abx  Has not been hydrating well    PE  Resolving areas of grade 2 dermatitis  Circumference of erythema improving              Plan  -add 1 more week of abx  -continue gentle cleanse 1x per day + moisturizer  -family to call if not improving  -RTC 1 m or sooner if needed

## 2023-07-31 DIAGNOSIS — R19.7 DIARRHEA OF PRESUMED INFECTIOUS ORIGIN: Primary | ICD-10-CM

## 2023-08-01 ENCOUNTER — SOCIAL WORK SERVICES (OUTPATIENT)
Dept: HEMATOLOGY/ONCOLOGY | Facility: HOSPITAL | Age: 88
End: 2023-08-01

## 2023-08-01 DIAGNOSIS — C4A.9 MERKEL CELL CARCINOMA (HCC): Primary | ICD-10-CM

## 2023-08-01 NOTE — PROGRESS NOTES
faxed referral to [facility] (R#[phone] I#[phone]) with instructions to coordinate visits with Dtrekha Moon.

## 2023-08-07 ENCOUNTER — APPOINTMENT (OUTPATIENT)
Dept: CT IMAGING | Facility: HOSPITAL | Age: 88
End: 2023-08-07
Attending: EMERGENCY MEDICINE
Payer: MEDICARE

## 2023-08-07 ENCOUNTER — HOSPITAL ENCOUNTER (EMERGENCY)
Facility: HOSPITAL | Age: 88
Discharge: HOME | End: 2023-08-07
Attending: EMERGENCY MEDICINE
Payer: MEDICARE

## 2023-08-07 VITALS
SYSTOLIC BLOOD PRESSURE: 117 MMHG | HEART RATE: 51 BPM | RESPIRATION RATE: 18 BRPM | TEMPERATURE: 97 F | DIASTOLIC BLOOD PRESSURE: 70 MMHG | OXYGEN SATURATION: 94 %

## 2023-08-07 DIAGNOSIS — R41.0 CONFUSION: ICD-10-CM

## 2023-08-07 DIAGNOSIS — E86.0 DEHYDRATION: Primary | ICD-10-CM

## 2023-08-07 LAB
ALBUMIN SERPL-MCNC: 3.5 G/DL (ref 3.4–5)
ALBUMIN/GLOB SERPL: 1.1 {RATIO} (ref 1–2)
ALP LIVER SERPL-CCNC: 72 U/L
ALT SERPL-CCNC: 24 U/L
ANION GAP SERPL CALC-SCNC: 8 MMOL/L (ref 0–18)
AST SERPL-CCNC: 25 U/L (ref 15–37)
BASOPHILS # BLD AUTO: 0.03 X10(3) UL (ref 0–0.2)
BASOPHILS NFR BLD AUTO: 0.7 %
BILIRUB SERPL-MCNC: 0.2 MG/DL (ref 0.1–2)
BILIRUB UR QL STRIP.AUTO: NEGATIVE
BUN BLD-MCNC: 56 MG/DL (ref 7–18)
CALCIUM BLD-MCNC: 8.6 MG/DL (ref 8.5–10.1)
CHLORIDE SERPL-SCNC: 107 MMOL/L (ref 98–112)
CLARITY UR REFRACT.AUTO: CLEAR
CO2 SERPL-SCNC: 22 MMOL/L (ref 21–32)
COLOR UR AUTO: YELLOW
CREAT BLD-MCNC: 2.52 MG/DL
EGFRCR SERPLBLD CKD-EPI 2021: 23 ML/MIN/1.73M2 (ref 60–?)
EOSINOPHIL # BLD AUTO: 0.05 X10(3) UL (ref 0–0.7)
EOSINOPHIL NFR BLD AUTO: 1.1 %
ERYTHROCYTE [DISTWIDTH] IN BLOOD BY AUTOMATED COUNT: 13.7 %
GLOBULIN PLAS-MCNC: 3.1 G/DL (ref 2.8–4.4)
GLUCOSE BLD-MCNC: 86 MG/DL (ref 70–99)
GLUCOSE UR STRIP.AUTO-MCNC: NEGATIVE MG/DL
HCT VFR BLD AUTO: 32.2 %
HGB BLD-MCNC: 10.4 G/DL
IMM GRANULOCYTES # BLD AUTO: 0.01 X10(3) UL (ref 0–1)
IMM GRANULOCYTES NFR BLD: 0.2 %
LEUKOCYTE ESTERASE UR QL STRIP.AUTO: NEGATIVE
LYMPHOCYTES # BLD AUTO: 0.81 X10(3) UL (ref 1–4)
LYMPHOCYTES NFR BLD AUTO: 17.6 %
MCH RBC QN AUTO: 32.5 PG (ref 26–34)
MCHC RBC AUTO-ENTMCNC: 32.3 G/DL (ref 31–37)
MCV RBC AUTO: 100.6 FL
MONOCYTES # BLD AUTO: 0.6 X10(3) UL (ref 0.1–1)
MONOCYTES NFR BLD AUTO: 13.1 %
NEUTROPHILS # BLD AUTO: 3.09 X10 (3) UL (ref 1.5–7.7)
NEUTROPHILS # BLD AUTO: 3.09 X10(3) UL (ref 1.5–7.7)
NEUTROPHILS NFR BLD AUTO: 67.3 %
NITRITE UR QL STRIP.AUTO: NEGATIVE
OSMOLALITY SERPL CALC.SUM OF ELEC: 299 MOSM/KG (ref 275–295)
PH UR STRIP.AUTO: 5 [PH] (ref 5–8)
PLATELET # BLD AUTO: 184 10(3)UL (ref 150–450)
POTASSIUM SERPL-SCNC: 5.2 MMOL/L (ref 3.5–5.1)
PROT SERPL-MCNC: 6.6 G/DL (ref 6.4–8.2)
PROT UR STRIP.AUTO-MCNC: NEGATIVE MG/DL
RBC # BLD AUTO: 3.2 X10(6)UL
RBC UR QL AUTO: NEGATIVE
SODIUM SERPL-SCNC: 137 MMOL/L (ref 136–145)
SP GR UR STRIP.AUTO: 1.01 (ref 1–1.03)
TROPONIN I HIGH SENSITIVITY: 24 NG/L
UROBILINOGEN UR STRIP.AUTO-MCNC: <2 MG/DL
WBC # BLD AUTO: 4.6 X10(3) UL (ref 4–11)

## 2023-08-07 PROCEDURE — 84484 ASSAY OF TROPONIN QUANT: CPT | Performed by: EMERGENCY MEDICINE

## 2023-08-07 PROCEDURE — 85025 COMPLETE CBC W/AUTO DIFF WBC: CPT | Performed by: EMERGENCY MEDICINE

## 2023-08-07 PROCEDURE — 70450 CT HEAD/BRAIN W/O DYE: CPT | Performed by: EMERGENCY MEDICINE

## 2023-08-07 PROCEDURE — 99285 EMERGENCY DEPT VISIT HI MDM: CPT

## 2023-08-07 PROCEDURE — 96360 HYDRATION IV INFUSION INIT: CPT

## 2023-08-07 PROCEDURE — 80053 COMPREHEN METABOLIC PANEL: CPT | Performed by: EMERGENCY MEDICINE

## 2023-08-07 PROCEDURE — 81003 URINALYSIS AUTO W/O SCOPE: CPT | Performed by: EMERGENCY MEDICINE

## 2023-08-07 NOTE — ED QUICK NOTES
Rounding Completed    Plan of Care reviewed. Waiting for CT Scan & urine specimen. Elimination needs assessed. Provided urinal.    Bed is locked and in lowest position. Call light within reach.

## 2023-08-07 NOTE — ED QUICK NOTES
Patient has been receiving radiation to right arm for Merkel's Cell Carcinoma. Last time was 2 weeks ago. Patient was at [facility] today, \"his mental status began to decline. He is usually friendly, but this is not like him. \" Patient is awake, alert, oriented. Unsure why he was brought to the hospital. \"I meant everything I was saying. I'm not confused. Or out of it! \" Patient is talkative. Slightly odd affect. Patient noted to be hypotensive while at OT, and upon arrival to ER as well.

## 2023-08-07 NOTE — ED INITIAL ASSESSMENT (HPI)
Pt's daughter reports that occupational health came today at noon and during the visit pt became confused and almost slipped out of his chair. Pt finished radiation 2 weeks ago and following it developed weakness and increased pain to his back. Pt's daughter reports that the pt's BP reading was low.

## 2023-08-23 ENCOUNTER — OFFICE VISIT (OUTPATIENT)
Dept: SURGERY | Facility: CLINIC | Age: 88
End: 2023-08-23
Payer: MEDICARE

## 2023-08-23 DIAGNOSIS — C4A.61 MERKEL CELL CARCINOMA OF UPPER EXTREMITY INCLUDING SHOULDER, RIGHT (HCC): Primary | ICD-10-CM

## 2023-08-23 NOTE — PROGRESS NOTES
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 Edil Pierre) and right forearm reconstruction with full-thickness skin graft from right axilla, partial complex layered wound closure forearm, complex layered closure right axilla on 3/21/2023. He denies fever and chills. He denies nausea, vomiting, diarrhea or constipation. His pain is controlled. He completed radiation therapy on 7/18/2023. Physical Exam     Right forearm skin graft with complete take, healed. Partial complex layered wound closure site well-healed. Right axillary incision well-healed. No erythema or wound drainage. There were no vitals filed for this visit. 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 Edil Pierre) and right forearm reconstruction with full-thickness skin graft from right axilla, partial complex layered wound closure forearm, complex layered closure right axilla on 3/21/2023. He can apply Aquaphor to the surgical site to keep the area moisturized. He will follow-up with me as needed. Questions were answered. Patient understands.

## 2024-03-11 ENCOUNTER — TELEPHONE (OUTPATIENT)
Dept: RADIATION ONCOLOGY | Facility: HOSPITAL | Age: 89
End: 2024-03-11

## 2024-03-22 ENCOUNTER — APPOINTMENT (OUTPATIENT)
Dept: RADIATION ONCOLOGY | Facility: HOSPITAL | Age: 89
End: 2024-03-22
Attending: INTERNAL MEDICINE
Payer: MEDICARE

## 2024-03-29 ENCOUNTER — HOSPITAL ENCOUNTER (OUTPATIENT)
Dept: RADIATION ONCOLOGY | Facility: HOSPITAL | Age: 89
Discharge: HOME | End: 2024-03-29
Attending: INTERNAL MEDICINE
Payer: MEDICARE

## 2024-03-29 VITALS
SYSTOLIC BLOOD PRESSURE: 117 MMHG | TEMPERATURE: 97 F | HEART RATE: 78 BPM | DIASTOLIC BLOOD PRESSURE: 59 MMHG | OXYGEN SATURATION: 93 % | RESPIRATION RATE: 18 BRPM

## 2024-03-29 DIAGNOSIS — C7B.1 METASTATIC MERKEL CELL CARCINOMA TO LYMPH NODE (HCC): Primary | ICD-10-CM

## 2024-03-29 PROCEDURE — 99213 OFFICE O/P EST LOW 20 MIN: CPT

## 2024-03-29 NOTE — PROGRESS NOTES
Nursing Follow-Up Note    Patient: Micheal Nickerson  YOB: 1931  Age: 89 year old  Radiation Oncologist: Dr. Nita Suero  Referring Physician: No ref. provider found  Chief Complaint:   Chief Complaint   Patient presents with    Follow - Up     Date: 3/29/2024    Toxicities: n/a    Vital Signs: /59 (BP Location: Left arm, Patient Position: Sitting, Cuff Size: adult)   Pulse 78   Temp 97.2 °F (36.2 °C) (Tympanic)   Resp 18   SpO2 93% ,   Wt Readings from Last 6 Encounters:   04/20/23 68.9 kg (152 lb)   04/16/23 68.9 kg (152 lb)   04/04/23 68.9 kg (152 lb)   03/21/23 67.6 kg (149 lb)   09/24/22 65.8 kg (145 lb)   03/21/22 68.5 kg (151 lb)       Allergies:    Allergies   Allergen Reactions    Contrast Dye [Gadolinium Derivatives]        Nursing Note: Hx of Merkel cell carcinoma of R forearm with SLNB of R axilla and R forearm reconstruction with full-thickness skin graft from R axilla 3/21/23. Completed RT to forearm and R axilla on 7/18/23. Here for follow up today. Pt feels well today. R arm skin intact without redness. Denies any pain to area. Granddaughter concerned that pt is spending a lot of time in the bathroom on a daily basis. Is not sure if he's urinating or having frequent bowel movements.

## 2024-03-29 NOTE — PATIENT INSTRUCTIONS
- Schedule CT chest, abdomen, and pelvis -- Dr. Suero will call with results & to determine follow-up with her.     -Call central scheduling at [phone] to schedule CT     - Schedule skin exam with dermatology, this should happen at least every 6 months    - Schedule PCP visit to discuss on-going issues with bowel movements. Consider referral to a GI doctor.

## 2024-04-06 NOTE — PROGRESS NOTES
[facility]  Radiation Oncology Follow-up    Patient Name: Micheal Nickerson   MRN: [id number]  Referring Physician: Rogelio Galarza MD    Diagnosis: Merkel cell carcinoma, LN positive     RT Rendered  Right forearm and axilla 5600 cGy in 28 fractions (completed July 2023).     Interval History:  The patient is a 89 year old male with above diagnosis and treatment rendered who presents today for follow-up.    Doing well from skin perspective  Does plan to see dermatology for an area on his central chest, family thinks he has scratching it  R arm is doing well, denies any pain or lymphedema  Family thinks he may be struggling with alternating diarrhea and constipation    Medications  Current Outpatient Medications   Medication Sig Dispense Refill    HYDROcodone-acetaminophen 5-325 MG Oral Tab Take 1 tablet by mouth in the morning and 1 tablet before bedtime.      DOXAZOSIN 8 MG Oral Tab TAKE 1 TABLET BY MOUTH DAILY 30 MINUTES AFTER DINNER 90 tablet 1    METOPROLOL SUCCINATE ER 25 MG Oral Tablet 24 Hr TAKE 1 TABLET BY MOUTH DAILY 90 tablet 1    montelukast 10 MG Oral Tab Take 1 tablet (10 mg total) by mouth every evening. 90 tablet 3    ELIQUIS 2.5 MG Oral Tab Take 1 tablet (2.5 mg total) by mouth 2 (two) times a day. 180 tablet 3    amLODIPine 5 MG Oral Tab Take 1 tablet (5 mg total) by mouth daily. 90 tablet 3    lisinopril 40 MG Oral Tab Take 1 tablet (40 mg total) by mouth daily. 90 tablet 3    sulfamethoxazole-trimethoprim -160 MG Oral Tab per tablet Take 1 tablet two times daily. (Patient not taking: Reported on 3/29/2024) 10 tablet 0    sulfamethoxazole-trimethoprim -160 MG Oral Tab per tablet Take 1 tablet by mouth twice daily. (Patient not taking: Reported on 3/29/2024) 28 tablet 0    Mometasone Furoate 0.1 % External Cream Apply 1-2x per day to skin during radiation. (Patient not taking: Reported on 7/18/2023) 50 g 0    mupirocin 2 % External Ointment Apply 1 Application topically daily.  (Patient not taking: Reported on 3/29/2024) 22 g 2    traMADol 50 MG Oral Tab Take 1 tablet (50 mg total) by mouth every 6 (six) hours as needed for Pain. (Patient not taking: Reported on 2023) 15 tablet 0    aspirin 81 MG Oral Tab EC Take 1 tablet (81 mg total) by mouth daily. (Patient not taking: Reported on 2023)      Ascorbic Acid (VITAMIN C OR) Take 500 mg by mouth daily.   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 He is doing well from this dx.     Plan:     -CT neck CAP for surveillance, follow-up based on this  -see PCP re ?diarrhea  -to see derm now for skin carissaal     Nita Suero MD  Radiation Oncology    [facility] high including chart review, exam, and time spent with the patient in counseling.

## 2024-04-11 ENCOUNTER — HOSPITAL ENCOUNTER (OUTPATIENT)
Dept: CV DIAGNOSTICS | Facility: HOSPITAL | Age: 89
Discharge: HOME | End: 2024-04-11
Attending: INTERNAL MEDICINE
Payer: MEDICARE

## 2024-04-11 ENCOUNTER — LAB ENCOUNTER (OUTPATIENT)
Dept: LAB | Facility: HOSPITAL | Age: 89
End: 2024-04-11
Attending: INTERNAL MEDICINE
Payer: MEDICARE

## 2024-04-11 ENCOUNTER — HOSPITAL ENCOUNTER (OUTPATIENT)
Dept: GENERAL RADIOLOGY | Facility: HOSPITAL | Age: 89
Discharge: HOME | End: 2024-04-11
Attending: INTERNAL MEDICINE
Payer: MEDICARE

## 2024-04-11 DIAGNOSIS — D69.6 THROMBOCYTOPENIA, UNSPECIFIED (HCC): ICD-10-CM

## 2024-04-11 DIAGNOSIS — R60.0 BILATERAL LEG EDEMA: ICD-10-CM

## 2024-04-11 DIAGNOSIS — I11.0 HYPERTENSIVE HEART DISEASE WITH CONGESTIVE HEART FAILURE (HCC): ICD-10-CM

## 2024-04-11 DIAGNOSIS — I50.31 ACUTE DIASTOLIC HEART FAILURE (HCC): ICD-10-CM

## 2024-04-11 DIAGNOSIS — R60.0 BILATERAL LEG EDEMA: Primary | ICD-10-CM

## 2024-04-11 DIAGNOSIS — I50.31 HYPERTENSIVE HEART DISEASE WITH ACUTE DIASTOLIC CONGESTIVE HEART FAILURE (HCC): ICD-10-CM

## 2024-04-11 DIAGNOSIS — R41.0 CONFUSION: ICD-10-CM

## 2024-04-11 DIAGNOSIS — I50.31: ICD-10-CM

## 2024-04-11 DIAGNOSIS — I11.0 HYPERTENSIVE HEART DISEASE WITH ACUTE DIASTOLIC CONGESTIVE HEART FAILURE (HCC): ICD-10-CM

## 2024-04-11 DIAGNOSIS — D64.9 ANEMIA, UNSPECIFIED: ICD-10-CM

## 2024-04-11 LAB
ALBUMIN SERPL-MCNC: 3.1 G/DL (ref 3.4–5)
ALBUMIN/GLOB SERPL: 1.1 {RATIO} (ref 1–2)
ALP LIVER SERPL-CCNC: 94 U/L
ALT SERPL-CCNC: 34 U/L
ANION GAP SERPL CALC-SCNC: 5 MMOL/L (ref 0–18)
AST SERPL-CCNC: 17 U/L (ref 15–37)
BILIRUB SERPL-MCNC: 0.5 MG/DL (ref 0.1–2)
BUN BLD-MCNC: 24 MG/DL (ref 9–23)
CALCIUM BLD-MCNC: 8.2 MG/DL (ref 8.5–10.1)
CHLORIDE SERPL-SCNC: 109 MMOL/L (ref 98–112)
CO2 SERPL-SCNC: 29 MMOL/L (ref 21–32)
CREAT BLD-MCNC: 1.07 MG/DL
DEPRECATED HBV CORE AB SER IA-ACNC: 37.4 NG/ML
EGFRCR SERPLBLD CKD-EPI 2021: 65 ML/MIN/1.73M2 (ref 60–?)
ERYTHROCYTE [DISTWIDTH] IN BLOOD BY AUTOMATED COUNT: 15.4 %
FASTING STATUS PATIENT QL REPORTED: YES
GLOBULIN PLAS-MCNC: 2.8 G/DL (ref 2.8–4.4)
GLUCOSE BLD-MCNC: 80 MG/DL (ref 70–99)
HCT VFR BLD AUTO: 34.6 %
HGB BLD-MCNC: 11.3 G/DL
IRON SATN MFR SERPL: 14 %
IRON SERPL-MCNC: 46 UG/DL
MCH RBC QN AUTO: 32.2 PG (ref 26–34)
MCHC RBC AUTO-ENTMCNC: 32.7 G/DL (ref 31–37)
MCV RBC AUTO: 98.6 FL
NT-PROBNP SERPL-MCNC: 1155 PG/ML (ref ?–450)
OSMOLALITY SERPL CALC.SUM OF ELEC: 299 MOSM/KG (ref 275–295)
PLATELET # BLD AUTO: 126 10(3)UL (ref 150–450)
POTASSIUM SERPL-SCNC: 3.9 MMOL/L (ref 3.5–5.1)
PROT SERPL-MCNC: 5.9 G/DL (ref 6.4–8.2)
RBC # BLD AUTO: 3.51 X10(6)UL
SODIUM SERPL-SCNC: 143 MMOL/L (ref 136–145)
T4 FREE SERPL-MCNC: 1 NG/DL (ref 0.8–1.7)
TIBC SERPL-MCNC: 323 UG/DL (ref 240–450)
TRANSFERRIN SERPL-MCNC: 217 MG/DL (ref 200–360)
TSI SER-ACNC: 3.22 MIU/ML (ref 0.36–3.74)
WBC # BLD AUTO: 2 X10(3) UL (ref 4–11)

## 2024-04-11 PROCEDURE — 93306 TTE W/DOPPLER COMPLETE: CPT | Performed by: INTERNAL MEDICINE

## 2024-04-11 PROCEDURE — 83540 ASSAY OF IRON: CPT

## 2024-04-11 PROCEDURE — 84443 ASSAY THYROID STIM HORMONE: CPT

## 2024-04-11 PROCEDURE — 84439 ASSAY OF FREE THYROXINE: CPT

## 2024-04-11 PROCEDURE — 85027 COMPLETE CBC AUTOMATED: CPT

## 2024-04-11 PROCEDURE — 36415 COLL VENOUS BLD VENIPUNCTURE: CPT

## 2024-04-11 PROCEDURE — 83550 IRON BINDING TEST: CPT

## 2024-04-11 PROCEDURE — 82728 ASSAY OF FERRITIN: CPT

## 2024-04-11 PROCEDURE — 83880 ASSAY OF NATRIURETIC PEPTIDE: CPT

## 2024-04-11 PROCEDURE — 80053 COMPREHEN METABOLIC PANEL: CPT

## 2024-04-11 PROCEDURE — 71046 X-RAY EXAM CHEST 2 VIEWS: CPT | Performed by: INTERNAL MEDICINE

## 2024-04-12 ENCOUNTER — HOSPITAL ENCOUNTER (INPATIENT)
Facility: HOSPITAL | Age: 89
LOS: 4 days | Discharge: HOME HEALTH | End: 2024-04-16
Attending: EMERGENCY MEDICINE | Admitting: INTERNAL MEDICINE
Payer: MEDICARE

## 2024-04-12 ENCOUNTER — APPOINTMENT (OUTPATIENT)
Dept: ULTRASOUND IMAGING | Facility: HOSPITAL | Age: 89
End: 2024-04-12
Attending: EMERGENCY MEDICINE
Payer: MEDICARE

## 2024-04-12 DIAGNOSIS — B37.49 CANDIDIASIS OF GENITALIA: ICD-10-CM

## 2024-04-12 DIAGNOSIS — R60.1 ANASARCA: ICD-10-CM

## 2024-04-12 DIAGNOSIS — N43.3 HYDROCELE, UNSPECIFIED HYDROCELE TYPE: ICD-10-CM

## 2024-04-12 DIAGNOSIS — J90 PLEURAL EFFUSION: Primary | ICD-10-CM

## 2024-04-12 DIAGNOSIS — D72.819 LEUKOPENIA, UNSPECIFIED TYPE: ICD-10-CM

## 2024-04-12 DIAGNOSIS — I48.20 ATRIAL FIBRILLATION, CHRONIC (HCC): ICD-10-CM

## 2024-04-12 DIAGNOSIS — D69.6 THROMBOCYTOPENIA (HCC): ICD-10-CM

## 2024-04-12 LAB
ALBUMIN SERPL-MCNC: 2.9 G/DL (ref 3.4–5)
ALBUMIN/GLOB SERPL: 1 {RATIO} (ref 1–2)
ALP LIVER SERPL-CCNC: 89 U/L
ALT SERPL-CCNC: 33 U/L
ANION GAP SERPL CALC-SCNC: 5 MMOL/L (ref 0–18)
AST SERPL-CCNC: 21 U/L (ref 15–37)
BASOPHILS # BLD AUTO: 0.02 X10(3) UL (ref 0–0.2)
BASOPHILS NFR BLD AUTO: 0.7 %
BILIRUB SERPL-MCNC: 0.6 MG/DL (ref 0.1–2)
BUN BLD-MCNC: 27 MG/DL (ref 9–23)
CALCIUM BLD-MCNC: 8.4 MG/DL (ref 8.5–10.1)
CHLORIDE SERPL-SCNC: 112 MMOL/L (ref 98–112)
CO2 SERPL-SCNC: 29 MMOL/L (ref 21–32)
CREAT BLD-MCNC: 1.19 MG/DL
EGFRCR SERPLBLD CKD-EPI 2021: 57 ML/MIN/1.73M2 (ref 60–?)
EOSINOPHIL # BLD AUTO: 0.04 X10(3) UL (ref 0–0.7)
EOSINOPHIL NFR BLD AUTO: 1.5 %
ERYTHROCYTE [DISTWIDTH] IN BLOOD BY AUTOMATED COUNT: 15.4 %
GLOBULIN PLAS-MCNC: 3 G/DL (ref 2.8–4.4)
GLUCOSE BLD-MCNC: 61 MG/DL (ref 70–99)
GLUCOSE BLD-MCNC: 80 MG/DL (ref 70–99)
HCT VFR BLD AUTO: 34.2 %
HGB BLD-MCNC: 11 G/DL
IMM GRANULOCYTES # BLD AUTO: 0.01 X10(3) UL (ref 0–1)
IMM GRANULOCYTES NFR BLD: 0.4 %
LYMPHOCYTES # BLD AUTO: 0.5 X10(3) UL (ref 1–4)
LYMPHOCYTES NFR BLD AUTO: 18.6 %
MAGNESIUM SERPL-MCNC: 2.3 MG/DL (ref 1.6–2.6)
MCH RBC QN AUTO: 32.1 PG (ref 26–34)
MCHC RBC AUTO-ENTMCNC: 32.2 G/DL (ref 31–37)
MCV RBC AUTO: 99.7 FL
MONOCYTES # BLD AUTO: 0.47 X10(3) UL (ref 0.1–1)
MONOCYTES NFR BLD AUTO: 17.5 %
NEUTROPHILS # BLD AUTO: 1.65 X10 (3) UL (ref 1.5–7.7)
NEUTROPHILS # BLD AUTO: 1.65 X10(3) UL (ref 1.5–7.7)
NEUTROPHILS NFR BLD AUTO: 61.3 %
NT-PROBNP SERPL-MCNC: 1087 PG/ML (ref ?–450)
OSMOLALITY SERPL CALC.SUM OF ELEC: 305 MOSM/KG (ref 275–295)
PLATELET # BLD AUTO: 133 10(3)UL (ref 150–450)
PLATELETS.RETICULATED NFR BLD AUTO: 4.7 % (ref 0–7)
POTASSIUM SERPL-SCNC: 3.8 MMOL/L (ref 3.5–5.1)
PROT SERPL-MCNC: 5.9 G/DL (ref 6.4–8.2)
RBC # BLD AUTO: 3.43 X10(6)UL
SODIUM SERPL-SCNC: 146 MMOL/L (ref 136–145)
TROPONIN I SERPL HS-MCNC: 24 NG/L
WBC # BLD AUTO: 2.7 X10(3) UL (ref 4–11)

## 2024-04-12 PROCEDURE — 84484 ASSAY OF TROPONIN QUANT: CPT | Performed by: EMERGENCY MEDICINE

## 2024-04-12 PROCEDURE — 76870 US EXAM SCROTUM: CPT | Performed by: EMERGENCY MEDICINE

## 2024-04-12 PROCEDURE — 99285 EMERGENCY DEPT VISIT HI MDM: CPT

## 2024-04-12 PROCEDURE — 85025 COMPLETE CBC W/AUTO DIFF WBC: CPT | Performed by: EMERGENCY MEDICINE

## 2024-04-12 PROCEDURE — 93005 ELECTROCARDIOGRAM TRACING: CPT

## 2024-04-12 PROCEDURE — 80053 COMPREHEN METABOLIC PANEL: CPT | Performed by: EMERGENCY MEDICINE

## 2024-04-12 PROCEDURE — 83735 ASSAY OF MAGNESIUM: CPT | Performed by: EMERGENCY MEDICINE

## 2024-04-12 PROCEDURE — 93975 VASCULAR STUDY: CPT | Performed by: EMERGENCY MEDICINE

## 2024-04-12 PROCEDURE — 93010 ELECTROCARDIOGRAM REPORT: CPT

## 2024-04-12 PROCEDURE — 96374 THER/PROPH/DIAG INJ IV PUSH: CPT

## 2024-04-12 PROCEDURE — 83880 ASSAY OF NATRIURETIC PEPTIDE: CPT | Performed by: EMERGENCY MEDICINE

## 2024-04-12 PROCEDURE — 85025 COMPLETE CBC W/AUTO DIFF WBC: CPT

## 2024-04-12 PROCEDURE — 80053 COMPREHEN METABOLIC PANEL: CPT

## 2024-04-12 PROCEDURE — 82962 GLUCOSE BLOOD TEST: CPT

## 2024-04-12 RX ORDER — METOPROLOL SUCCINATE 25 MG/1
25 TABLET, EXTENDED RELEASE ORAL DAILY
Status: DISCONTINUED | OUTPATIENT
Start: 2024-04-13 | End: 2024-04-13

## 2024-04-12 RX ORDER — NYSTATIN 100000 U/G
OINTMENT TOPICAL ONCE
Status: COMPLETED | OUTPATIENT
Start: 2024-04-12 | End: 2024-04-12

## 2024-04-12 RX ORDER — MONTELUKAST SODIUM 10 MG/1
10 TABLET ORAL NIGHTLY
Status: DISCONTINUED | OUTPATIENT
Start: 2024-04-12 | End: 2024-04-16

## 2024-04-12 RX ORDER — MONTELUKAST SODIUM 10 MG/1
10 TABLET ORAL DAILY
Status: DISCONTINUED | OUTPATIENT
Start: 2024-04-12 | End: 2024-04-12

## 2024-04-12 RX ORDER — ACETAMINOPHEN 325 MG/1
650 TABLET ORAL EVERY 6 HOURS PRN
Status: DISCONTINUED | OUTPATIENT
Start: 2024-04-12 | End: 2024-04-16

## 2024-04-12 RX ORDER — ONDANSETRON 2 MG/ML
4 INJECTION INTRAMUSCULAR; INTRAVENOUS EVERY 6 HOURS PRN
Status: DISCONTINUED | OUTPATIENT
Start: 2024-04-12 | End: 2024-04-16

## 2024-04-12 RX ORDER — FUROSEMIDE 10 MG/ML
40 INJECTION INTRAMUSCULAR; INTRAVENOUS ONCE
Status: COMPLETED | OUTPATIENT
Start: 2024-04-12 | End: 2024-04-12

## 2024-04-12 RX ORDER — TERAZOSIN 5 MG/1
10 CAPSULE ORAL NIGHTLY
Status: DISCONTINUED | OUTPATIENT
Start: 2024-04-12 | End: 2024-04-16

## 2024-04-12 RX ORDER — AMLODIPINE BESYLATE 5 MG/1
5 TABLET ORAL DAILY
Status: DISCONTINUED | OUTPATIENT
Start: 2024-04-13 | End: 2024-04-13

## 2024-04-12 RX ORDER — LISINOPRIL 40 MG/1
40 TABLET ORAL DAILY
Status: DISCONTINUED | OUTPATIENT
Start: 2024-04-13 | End: 2024-04-13

## 2024-04-12 NOTE — ED PROVIDER NOTES
Patient Seen in: [facility] Emergency Department      History     Chief Complaint   Patient presents with    Eval-G     Stated Complaint: swelling to testicle x 3 days    Subjective:   89-year-old male, history of A-fib on Eliquis, presents with peripheral edema and scrotal swelling/anasarca.  Family states been ongoing for quite some time but the peripheral edema got worse, started on p.o. diuretics few days ago but not getting any better.  Saw his PCP yesterday, elevated proBNP left-sided pleural effusion.  Also an echo with a normal EF.  Sent here for evaluation.  Not as mobile as he was.  Family states his leg is getting more more swollen this make him more more weak and is more more bedbound because of that.  Limited HPI's ROS secondary to age.  He denies any acute complaints.            Objective:   Past Medical History:    ALLERGIC RHINITIS    Arrhythmia    a-fib    Back problem    CANCER    prostate, GL 8 (4+4) L base, GL 6 R base    Chronic rhinitis    Clostridioides difficile infection    treated, no longer symptomatic    Diverticulosis    Diverticulosis    Exposure to medical diagnostic radiation    2011    Heart attack (HCC)    daughter denies ever having heart attack    Hematuria  PAINLESS    CYCTOSCOPY 1/93   IVP    High blood pressure    History of COVID-19    hospitalized x 4 days. cough/sob. 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 Hernandez    Spine surgery procedure unlisted      Vasectomy                  Social History     Socioeconomic History    Marital status:     Number of children: 3   Occupational History    Occupation: retired     Tobacco Use    Smoking status: Former     Types: Pipe     Quit date:      Years since quittin.3    Smokeless tobacco: Never   Vaping Use    Vaping status: Never Used   Substance and Sexual Activity    Alcohol use: No    Drug use: No    Sexual activity: Not Currently     Partners: Female   Other Topics Concern     Service Yes     Comment: [place] war--[employer]    Exercise Yes    Seat Belt Yes   Social History Narrative    Lives with a daughter and her family, and rotates between [facility], [place], and [place].          23-Now lives with daughter in [place] all year     Social Determinants of Health      Received from [facility]              Review of Systems   Unable to perform ROS: Age       Positive for stated complaint: swelling to testicle x 3 days  Other systems are as noted in HPI.  Constitutional and vital signs reviewed.      All other systems reviewed and negative except as noted above.    Physical Exam     ED Triage Vitals [24 1446]   /57   Pulse 71   Resp 20   Temp 96.8 °F (36 °C)   Temp src    SpO2 97 %   O2 Device None (Room air)       Current:/65   Pulse (!) 45   Temp 96.8 °F (36 °C)   Resp 16   Ht 152.4 cm (5')   Wt 68.9 kg   SpO2 98%   BMI 29.67 kg/m²         Physical Exam  Vitals and nursing note reviewed.   Constitutional:       Appearance: He is not toxic-appearing.   HENT:      Head: Normocephalic.   Eyes:      Pupils: Pupils are equal, round, and reactive to light.   Cardiovascular:      Rate and Rhythm: Normal rate. Rhythm irregular.      Pulses: Normal pulses.   Pulmonary:      Effort: Pulmonary effort is normal.       Comments: Diminished breath sounds in the left  Abdominal:      Palpations: Abdomen is soft.      Tenderness: There is no abdominal tenderness.   Genitourinary:     Comments: Some yeast/fungal infection surrounding glans penis.  Musculoskeletal:      Cervical back: Neck supple.      Right lower leg: Edema present.      Left lower leg: Edema present.      Comments: Plus pitting edema of the bilateral lower extremities.  Anasarca starting in his feet up through his scrotum   Skin:     General: Skin is warm and dry.      Coloration: Skin is pale.   Neurological:      Mental Status: He is alert. Mental status is at baseline.   Psychiatric:         Mood and Affect: Mood normal.         Behavior: Behavior normal.               ED Course     Labs Reviewed   COMP METABOLIC PANEL (14) - Abnormal; Notable for the following components:       Result Value    Glucose 61 (*)     Sodium 146 (*)     BUN 27 (*)     Calcium, Total 8.4 (*)     Calculated Osmolality 305 (*)     eGFR-Cr 57 (*)     Total Protein 5.9 (*)     Albumin 2.9 (*)     All other components within normal limits   PRO BETA NATRIURETIC PEPTIDE - Abnormal; Notable for the following components:    Pro-Beta Natriuretic Peptide 1,087 (*)     All other components within normal limits   CBC W/ DIFFERENTIAL - Abnormal; Notable for the following components:    WBC 2.7 (*)     RBC 3.43 (*)     HGB 11.0 (*)     HCT 34.2 (*)     .0 (*)     Lymphocyte Absolute 0.50 (*)     All other components within normal limits   TROPONIN I HIGH SENSITIVITY - Normal   MAGNESIUM - Normal   POCT GLUCOSE - Normal   CBC WITH DIFFERENTIAL WITH PLATELET    Narrative:     The following orders were created for panel order CBC With Differential With Platelet.  Procedure                               Abnormality         Status                     ---------                               -----------         ------                     CBC W/ DIFFERENTIAL[[id number]]          Abnormal             Final result                 Please view results for these tests on the individual orders.   RAINBOW DRAW LAVENDER   RAINBOW DRAW LIGHT GREEN   RAINBOW DRAW BLUE     EKG    Rate, intervals and axes as noted on EKG Report.  Rate: 68  Rhythm: Atrial Fibrillation  Reading: EKG atrial fibrillation 62 bpm.  A-fib with slow ventricular response.  Low voltage EKG.  Normal axis.  No ST elevations.  Return September 2022, A-fib replaces sinus rhythm    Patient placed on cardiac monitor for telemetry monitoring secondary to peripheral edema, afib. Interpretation at bedside by me is afib.                            [facility]      US SCROTUM W/ DOPPLER (CPT=93975/89056)    Result Date: 4/12/2024  CONCLUSION:  No evidence of testicular torsion orchitis or epididymitis.  Moderate bilateral hydroceles are present.  Nonspecific soft tissue edema of the scrotum.   LOCATION:  [facility]    Dictated by (CST): John Irizarry MD on 4/12/2024 at 6:56 PM     Finalized by (CST): John Irizarry MD on 4/12/2024 at 6:59 PM       XR CHEST PA + LAT CHEST (CPT=71046)    Result Date: 4/11/2024  CONCLUSION:  1. Mild-to-moderate left-sided pleural effusion with left basilar atelectasis/infiltrates. 2. Mild interstitial opacities may represent mild edema. 3. Mild cardiac enlargement.    LOCATION:  [facility]   Dictated by (CST): Camelia Figueroa MD on 4/11/2024 at 9:24 AM     Finalized by (CST): Camelia Figueroa MD on 4/11/2024 at 9:26 AM        Admission disposition: 4/12/2024  7:23 PM         I independently interpreted x-ray of the chest and note left-sided pleural effusion    Differential diagnosis includes, was not limited to, pleural effusion, CHF, anasarca, A-fib, dehydration, electrolyte disturbance    Family at bedside helpful to provide information on the history of presenting illness    External chart review demonstrates his echo from yesterday with a normal EF, normal systolic and diastolic function, previous cardiology consultations with [facility]  cardiology    89-year-old male with pleural effusion, anasarca, pitting edema, hypoalbuminemia, A-fib, presents with volume overload.  Will need aggressive diuresis.  Not responding to p.o. diuresis at home.  Had an echo yesterday looked pretty well.  Discussed with [facility] cardiology who will see in consultation.  He is not hypoxic, not complaining of shortness of breath.  Admitted to [facility] hospitalist, awaiting bed assignment                                     Medical Decision Making      Disposition and Plan     Clinical Impression:  1. Pleural effusion    2. Anasarca    3. Thrombocytopenia (HCC)    4. Leukopenia, unspecified type    5. Candidiasis of genitalia    6. Atrial fibrillation, chronic ([facility])    7. Hydrocele, unspecified hydrocele type         Disposition:  Admit  4/12/2024  7:23 pm    Follow-up:  No follow-up provider specified.        Medications Prescribed:  Current Discharge Medication List                            Hospital Problems       Present on Admission  Date Reviewed: 4/6/2024            ICD-10-CM Noted POA    * (Principal) Pleural effusion J90 2/17/2022 Unknown

## 2024-04-12 NOTE — ED INITIAL ASSESSMENT (HPI)
Was at doctor yesterday for cardiac work up. Pt stated he noticed left testicle enlarged yesterday. Denies any pain. Per son pt has dementia, testicle swelling has been enlarged for the past month but has been increasingly getting worse.

## 2024-04-13 LAB
ANION GAP SERPL CALC-SCNC: 4 MMOL/L (ref 0–18)
BASOPHILS # BLD AUTO: 0.01 X10(3) UL (ref 0–0.2)
BASOPHILS NFR BLD AUTO: 0.6 %
BUN BLD-MCNC: 25 MG/DL (ref 9–23)
CALCIUM BLD-MCNC: 8.4 MG/DL (ref 8.5–10.1)
CHLORIDE SERPL-SCNC: 112 MMOL/L (ref 98–112)
CO2 SERPL-SCNC: 29 MMOL/L (ref 21–32)
CREAT BLD-MCNC: 0.92 MG/DL
EGFRCR SERPLBLD CKD-EPI 2021: 78 ML/MIN/1.73M2 (ref 60–?)
EOSINOPHIL # BLD AUTO: 0.05 X10(3) UL (ref 0–0.7)
EOSINOPHIL NFR BLD AUTO: 2.8 %
ERYTHROCYTE [DISTWIDTH] IN BLOOD BY AUTOMATED COUNT: 15.7 %
GLUCOSE BLD-MCNC: 71 MG/DL (ref 70–99)
HCT VFR BLD AUTO: 33.9 %
HGB BLD-MCNC: 11.2 G/DL
IMM GRANULOCYTES # BLD AUTO: 0 X10(3) UL (ref 0–1)
IMM GRANULOCYTES NFR BLD: 0 %
LYMPHOCYTES # BLD AUTO: 0.63 X10(3) UL (ref 1–4)
LYMPHOCYTES NFR BLD AUTO: 35.6 %
MCH RBC QN AUTO: 31.9 PG (ref 26–34)
MCHC RBC AUTO-ENTMCNC: 33 G/DL (ref 31–37)
MCV RBC AUTO: 96.6 FL
MONOCYTES # BLD AUTO: 0.37 X10(3) UL (ref 0.1–1)
MONOCYTES NFR BLD AUTO: 20.9 %
NEUTROPHILS # BLD AUTO: 0.71 X10 (3) UL (ref 1.5–7.7)
NEUTROPHILS # BLD AUTO: 0.71 X10(3) UL (ref 1.5–7.7)
NEUTROPHILS NFR BLD AUTO: 40.1 %
OSMOLALITY SERPL CALC.SUM OF ELEC: 303 MOSM/KG (ref 275–295)
PLATELET # BLD AUTO: 109 10(3)UL (ref 150–450)
POTASSIUM SERPL-SCNC: 3.6 MMOL/L (ref 3.5–5.1)
RBC # BLD AUTO: 3.51 X10(6)UL
SODIUM SERPL-SCNC: 145 MMOL/L (ref 136–145)
WBC # BLD AUTO: 1.8 X10(3) UL (ref 4–11)

## 2024-04-13 PROCEDURE — 80048 BASIC METABOLIC PNL TOTAL CA: CPT | Performed by: INTERNAL MEDICINE

## 2024-04-13 PROCEDURE — 97161 PT EVAL LOW COMPLEX 20 MIN: CPT

## 2024-04-13 PROCEDURE — 85025 COMPLETE CBC W/AUTO DIFF WBC: CPT | Performed by: INTERNAL MEDICINE

## 2024-04-13 PROCEDURE — 97530 THERAPEUTIC ACTIVITIES: CPT

## 2024-04-13 RX ORDER — FUROSEMIDE 10 MG/ML
40 INJECTION INTRAMUSCULAR; INTRAVENOUS DAILY
Status: DISCONTINUED | OUTPATIENT
Start: 2024-04-13 | End: 2024-04-13

## 2024-04-13 RX ORDER — FUROSEMIDE 10 MG/ML
40 INJECTION INTRAMUSCULAR; INTRAVENOUS
Status: DISCONTINUED | OUTPATIENT
Start: 2024-04-13 | End: 2024-04-16

## 2024-04-13 RX ORDER — LISINOPRIL 20 MG/1
20 TABLET ORAL DAILY
Status: DISCONTINUED | OUTPATIENT
Start: 2024-04-13 | End: 2024-04-16

## 2024-04-13 NOTE — PROGRESS NOTES
04/12/24 2242 04/12/24 2244 04/12/24 2246   Vital Signs   Pulse 57 66 72   /81 108/76 111/78   MAP (mmHg) 93 88 90   BP Location Left arm Left arm Left arm   BP Method Automatic Automatic Automatic   Patient Position Lying Sitting Standing     Admission Orthostatic BP

## 2024-04-13 NOTE — H&P
[facility]   part of [facility]    History and Physical     Micheal Nickerson Patient Status:  Inpatient    1931 MRN [id number]   Location [facility] 8NE-A Attending Avery Khan MD   Hosp Day # 1 PCP Thuan Moody MD     Chief Complaint: Testicular swelling    History of Present Illness: Micheal Nickerson is a 89 year old male with history of atrial fibrillation, prostate cancer, hx c-diff, hx diverticulosis, htn and merkel cell carcinoma s/p radiation presenting with increased swelling of the testicle. History was obtained from the patient and daughter at the bedside. It appears the patient had increased LE swelling for the last week. He saw his PMD and labs, cxr and echo were ordered. He was started on diuretics and had maybe a day of this. He noticed his testicles were more swollen but not painful so he came to the ER for further evaluation and treatment. Per daughter the patient has intermittent increase in confusion as well. Patient otherwise has no other positive review of systems.     Past Medical History:  Past Medical History:    ALLERGIC RHINITIS    Arrhythmia    a-fib    Back problem    CANCER    prostate, GL 8 (4+4) L base, GL 6 R base    Chronic rhinitis    Clostridioides difficile infection    treated, no longer symptomatic    Diverticulosis    Diverticulosis    Exposure to medical diagnostic radiation        Heart attack (HCC)    daughter denies ever having heart attack    Hematuria  PAINLESS    CYCTOSCOPY    IVP    High blood pressure    History of COVID-19    hospitalized x 4 days. cough/sob. 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 Hernandez    Spine surgery procedure unlisted  1968    Vasectomy         Social History:  reports that he quit smoking about 60 years ago. His smoking use included pipe. He has never used smokeless tobacco. He reports that he does not drink alcohol and does not use drugs.no illegal drugs, , 3 children, use a walker, live with daughter    Family History:   Family History   Problem Relation Age of Onset    Other ([other]) Mother     Cancer Father         esophageal    Heart Attack Brother    Mother passed  Father passed  2 brothers passed  0 sisters    Allergies:   Allergies   Allergen Reactions    Contrast Dye [Gadolinium Derivatives]        Medications:    No current facility-administered medications on file prior to encounter.     Current Outpatient Medications on File Prior to Encounter   Medication Sig Dispense Refill    HYDROcodone-acetaminophen 5-325 MG Oral Tab Take 1 tablet by mouth in the morning and 1 tablet before bedtime.      DOXAZOSIN 8 MG Oral Tab TAKE 1 TABLET BY MOUTH DAILY 30 MINUTES AFTER DINNER 90 tablet 1    METOPROLOL SUCCINATE ER 25 MG Oral Tablet 24 Hr TAKE 1 TABLET BY MOUTH DAILY 90 tablet 1    montelukast 10 MG Oral Tab Take 1 tablet (10 mg total) by mouth every evening. 90 tablet 3    ELIQUIS 2.5 MG Oral Tab Take 1 tablet (2.5 mg total) by mouth 2 (two) times a day. 180 tablet 3    amLODIPine 5 MG Oral Tab Take 1 tablet (5 mg total) by mouth daily. 90 tablet 3    lisinopril 40 MG Oral Tab Take 1 tablet (40 mg total) by mouth daily. 90 tablet 3       Review of Systems:   A comprehensive 14 point review of systems was completed.    Pertinent  positives and negatives noted in the HPI.    Physical Exam:    /66 (BP Location: Left arm)   Pulse 52   Temp 97.9 °F (36.6 °C) (Oral)   Resp 19   Ht 5' (1.524 m)   Wt 146 lb 4.8 oz (66.4 kg)   SpO2 97%   BMI 28.57 kg/m²   General: No acute distress. Alert and oriented x 2, not date  HEENT: Normocephalic atraumatic. Moist mucous membranes. EOM-I  Neck: No JVD. No carotid bruits.  Respiratory: Clear to auscultation bilaterally. No wheezes. No crackles  Cardiovascular: S1, S2. Regular rate and rhythm. No murmurs  Chest and Back: No tenderness or deformity.  Abdomen: Soft, nontender, nondistended.  Positive bowel sounds. No rebound, guarding  Neurologic: No focal neurological deficits. CNII-XII grossly intact. Sensation and strength intact  Musculoskeletal: Moves all extremities.  Extremities: 2+ edema of the LE, no tenderness of the LE  Integument: No new rashes or lesions.   Psychiatric: Appropriate mood and affect.      Diagnostic Data:      Labs:  Recent Labs   Lab 04/11/24  0728 04/12/24  1455 04/13/24  0541   WBC 2.0* 2.7* 1.8*   HGB 11.3* 11.0* 11.2*   MCV 98.6 99.7 96.6   .0* 133.0* 109.0*       Recent Labs   Lab 04/11/24  0728 04/12/24  1455 04/13/24  0541   GLU 80 61* 71   BUN 24* 27* 25*   CREATSERUM 1.07 1.19 0.92   CA 8.2* 8.4* 8.4*   ALB 3.1* 2.9*  --     146* 145   K 3.9 3.8 3.6    112 112   CO2 29.0 29.0 29.0   ALKPHO 94 89  --    AST 17 21  --    ALT 34 33  --    BILT 0.5 0.6  --    TP 5.9* 5.9*  --        Estimated Creatinine Clearance: 36.2 mL/min (based on SCr of 0.92 mg/dL).    No results for input(s): \"PTP\", \"INR\" in the last 168 hours.    No results for input(s): \"TROP\", \"CK\" in the last 168 hours.    Imaging: Imaging data reviewed in Epic.      ASSESSMENT / PLAN:   89 year old male with history of atrial fibrillation, prostate cancer, hx c-diff, hx diverticulosis, htn and merkel cell carcinoma s/p radiation presenting with increased swelling of the testicle.      Testicular swelling  -likely related to fluid overload  -testicular US shows no acute pathology  -monitor    LE edema  -echo shows no significant systolic HF  -pt with increased BNP  -likely HFpEF.   -cardiology consulted  -iv lasix  -strict I/o    Pancytopenia   -if worsens may need to consider inpt evaluation otherwise outpt Heme follow up    Confusion  -pt more alert now  -likely progression of dementia  -no obvious signs of infection  -pt on AC so unlikely cva related, also no other focal signs  -monitor clinically     Bradycardia  -tele  -hold rate controlling meds  -cardiology consulted.     Atrial fibrillation  -rate controlled  -hold metoprolol due to pauses  -on eliquis    HTN  -sbp stable  -metoprolol on hold  -amlodipine  -lisinopril     BPH  -terazosin    Quality:  DVT Prophylaxis: eliquis  CODE status: full code, daughter to discuss dnr with pt and family  Ashley: external     Plan of care discussed with patient and staff    Dispo: no discharge.     Avery Khan MD  [facility]  [phone]

## 2024-04-13 NOTE — CONSULTS
[facility] Consult Note       SUBJECTIVE:    Reason for Consultation: swelling  Follows Gemaryanne    History of Present Illness: Patient is 89 year old male history of atrial fibrillation, prostate cancer, hx c-diff, hx diverticulosis, htn and merkel cell carcinoma s/p radiation presenting with increased swelling of the testicle.  Increased LE swelling in last week as well. No CP or SOB.     Prior to Admission medications    Medication Sig Start Date End Date Taking? Authorizing Provider   HYDROcodone-acetaminophen 5-325 MG Oral Tab Take 1 tablet by mouth in the morning and 1 tablet before bedtime.   Yes External/Patient, Reported   DOXAZOSIN 8 MG Oral Tab TAKE 1 TABLET BY MOUTH DAILY 30 MINUTES AFTER DINNER 4/2/22  Yes Thuan Moody MD   METOPROLOL SUCCINATE ER 25 MG Oral Tablet 24 Hr TAKE 1 TABLET BY MOUTH DAILY 4/2/22  Yes Thuan Moody MD   montelukast 10 MG Oral Tab Take 1 tablet (10 mg total) by mouth every evening. 3/21/22  Yes Thuan Moody MD   ELIQUIS 2.5 MG Oral Tab Take 1 tablet (2.5 mg total) by mouth 2 (two) times a day. 3/15/22  Yes Gibson Alonso MD   amLODIPine 5 MG Oral Tab Take 1 tablet (5 mg total) by mouth daily. 3/15/22  Yes Gibson Alonso MD   lisinopril 40 MG Oral Tab Take 1 tablet (40 mg total) by mouth daily. 3/15/22  Yes Gibson Alonso MD       Allergies   Allergen Reactions    Contrast Dye [Gadolinium Derivatives]        Past Medical History:    ALLERGIC RHINITIS    Arrhythmia    a-fib    Back problem    CANCER    prostate, GL 8 (4+4) L base, GL 6 R base    Chronic rhinitis    Clostridioides difficile infection    treated, no longer symptomatic    Diverticulosis    Diverticulosis    Exposure to medical diagnostic radiation    2011    Heart attack (HCC)    daughter denies ever having heart attack    Hematuria  PAINLESS    CYCTOSCOPY 1/93   IVP    High blood pressure    History of COVID-19    hospitalized x 4 days.  cough/sob. 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 Hernandez    Spine surgery procedure unlisted      Vasectomy           Family History   Problem Relation Age of Onset    Other ([other]) Mother     Cancer Father         esophageal    Heart Attack Brother          Social   Social History     Socioeconomic History    Marital status:     Number of children: 3   Occupational History    Occupation: retired     Tobacco Use    Smoking status: Former     Types: Pipe     Quit date:      Years since quittin.3    Smokeless tobacco: Never   Vaping Use    Vaping status: Never Used   Substance and Sexual Activity    Alcohol use: No    Drug use: No    Sexual activity: Not Currently     Partners: Female   Other Topics Concern     Service Yes     Comment: [employer] war--[employer]    Exercise Yes    Seat Belt Yes   Social History Narrative    Lives with a daughter and her family, and rotates between [facility] [place], [place], and [place].          23-Now lives with daughter in [place] all year     Social Determinants of Health     Food Insecurity: No Food Insecurity (2024)    Food Insecurity     Food Insecurity: Never true   Transportation Needs: No Transportation Needs (2024)    Transportation Needs     Lack of Transportation: No   Housing Stability: Low Risk   (4/12/2024)    Housing Stability     Housing Instability: No       Review of Systems:    All systems were reviewed and are negative except as described above.      OBJECTIVE:    INPATIENT MEDICATIONS:    Scheduled Meds:    furosemide  40 mg Intravenous Daily    amLODIPine  5 mg Oral Daily    terazosin  10 mg Oral Nightly    apixaban  2.5 mg Oral BID    lisinopril  40 mg Oral Daily    [Held by provider] metoprolol succinate ER  25 mg Oral Daily    montelukast  10 mg Oral Nightly       Continuous Infusions:       Vital signs in last 24 hours:    Patient Vitals for the past 24 hrs:   BP Temp Temp src Pulse Resp SpO2 Height Weight   04/13/24 0812 106/65 97.5 °F (36.4 °C) Oral 58 19 95 % -- --   04/13/24 0537 112/66 97.9 °F (36.6 °C) Oral 52 19 97 % -- 146 lb 4.8 oz (66.4 kg)   04/13/24 0228 110/67 -- -- (!) 47 -- 94 % -- --   04/12/24 2246 111/78 -- -- 72 -- 93 % -- 148 lb 13 oz (67.5 kg)   04/12/24 2244 108/76 -- -- 66 -- 95 % -- --   04/12/24 2242 120/81 -- -- 57 -- 96 % -- --   04/12/24 2100 108/68 -- -- 50 15 97 % -- --   04/12/24 2030 101/80 -- -- 64 14 95 % -- --   04/12/24 2005 110/74 -- -- 59 -- 95 % -- --   04/12/24 1836 114/65 -- -- -- -- -- -- --   04/12/24 1830 115/61 -- -- (!) 45 16 98 % -- --   04/12/24 1446 100/57 96.8 °F (36 °C) -- 71 20 97 % 5' (1.524 m) 151 lb 14.4 oz (68.9 kg)            Wt Readings from Last 3 Encounters:   04/13/24 146 lb 4.8 oz (66.4 kg)   04/20/23 152 lb (68.9 kg)   04/16/23 152 lb (68.9 kg)         Intake/Output Summary last 24 hours:    Intake/Output Summary (Last 24 hours) at 4/13/2024 0953  Last data filed at 4/13/2024 0812  Gross per 24 hour   Intake 100 ml   Output 1600 ml   Net -1500 ml       Physical Exam:  General: No acute distress. Alert and oriented x 2, not date  HEENT: Normocephalic atraumatic. Moist mucous membranes. EOM-I  Neck: +JVD. No carotid bruits.  Respiratory: Clear to auscultation bilaterally. No wheezes. No crackles  Cardiovascular: S1, S2. Irreg,  irreg  Chest and Back: No tenderness or deformity.  Neurologic: No focal neurological deficits. CNII-XII grossly intact. Sensation and strength intact  Musculoskeletal: Moves all extremities.  Extremities: 2+ edema of the LE, no tenderness of the LE  Integument: No new rashes or lesions.   Psychiatric: Appropriate mood and affect.    Diagnostics:  Conclusions:     1. Study data: Atrial fibrillation with slow ventricular response   2. Left ventricle: The cavity size was normal. Wall thickness was normal.      Systolic function was normal. The estimated ejection fraction was 55-60%,      by visual assessment. No diagnostic evidence for regional wall motion      abnormalities. Unable to assess LV diastolic function due to heart      rhythm.   3. Left atrium: The left atrial volume was markedly increased.   4. Right atrium: The atrium was moderately dilated.   5. Mitral valve: There was mild regurgitation.   6. Tricuspid valve: There was moderate regurgitation.   7. Pulmonary arteries: Systolic pressure was mildly increased, in the range      of 40mm Hg to 45mm Hg. Estimated pulmonary artery diastolic pressure was      24mm Hg.   8. Pericardium, extracardiac: A small pericardial effusion was identified.      There was no evidence of hemodynamic compromise. There was a small right      pleural effusion.   Impressions:  No previous study from [facility] was   available for comparison.   *     Tele: Afib with slow vent rate, pauses of 3.5 sec    Cardiac:  No results for input(s): \"CKMB\" in the last 168 hours.    Invalid input(s): \"CKTOTAL\", \"CKMBINDEX\", \"TROPONINT\"    HEM:  Recent Labs   Lab 04/11/24  0728 04/12/24  1455 04/13/24  0541   WBC 2.0* 2.7* 1.8*   HGB 11.3* 11.0* 11.2*   .0* 133.0* 109.0*       Chem:  Recent Labs   Lab 04/11/24  0728 04/12/24  1455 04/13/24  0541    146* 145   K 3.9 3.8 3.6    112 112   CO2 29.0 29.0 29.0   BUN 24* 27* 25*   MG  --  2.3  --    ALT 34 33  --     AST 17 21  --    ALB 3.1* 2.9*  --        Coagulation:  Recent Labs   Lab 04/11/24  0728 04/12/24  1455 04/13/24  0541   HCT 34.6* 34.2* 33.9*   HGB 11.3* 11.0* 11.2*   .0* 133.0* 109.0*       Last Lipid Panel:  No results for input(s): \"CHOLESTEROL\", \"HDL\", \"TRIG\", \"NONHDL\", \"CHOLHDL\" in the last 168 hours.    Invalid input(s): \"LDLCA\"                                                     ASSESSMENT:  89 year old    Anasarca/  Pleural effusion/ HFpEF acute on chronic  PH est PA pressures 45  Neg troponin   Dry wt:  142 10-23  BNP elevated- goal 500 or less for this pt  Atrial fibrillation, chronic (HCC) slow Vent HR   HTN  CAD (calcium seen on CT of chest)   Thrombocytopenia (HCC)/Leukopenia/ anemia with Iron dieficency   IV Iron may help CHF   Candidiasis of genitalia          PLAN:    Cont IVP Lasix 40mg bid  Stop amloidipine and decrease lisinopril to allow better BP to eliminate issues with WRF due to low perfusion  Monitor HR on tele (remove PPM pads/ crash cart). Hold metoprolol. Avoid meds that lower HR

## 2024-04-13 NOTE — PROGRESS NOTES
NURSING ADMISSION NOTE    Patient admitted via cart accompanied by daughter  Oriented to room  Safety precautions initiated  Bed in low position  Call light within reach  Skin assessment completed with Chaz RN    Patient alert and oriented x 2-3. Up x2 walker. On RA. Afib/Joshua, HR in the upper 20's on tele with multiple episodes of pauses,MD aware. Male purewick in place r/t to diuretics. No complaints of pain, shortness of breath or chest pain/discomfort. POC : MD's to see, PT/OT eval. Fall precautions in place. Call light within reach.

## 2024-04-13 NOTE — PLAN OF CARE
Patient alert and oriented x4. On room air , spo maintained.sinus nadine on the monitor. Vital signs stable. Continent of bowel and bladder. Up with standby assist. No complaints of pain. Patient resting comfortably at this time. All needs addressed. Call light within reach. Bed in low and locked position. All belongings within reach.   Problem: CARDIOVASCULAR - ADULT  Goal: Maintains optimal cardiac output and hemodynamic stability  Description: INTERVENTIONS:  - Monitor vital signs, rhythm, and trends  - Monitor for bleeding, hypotension and signs of decreased cardiac output  - Evaluate effectiveness of vasoactive medications to optimize hemodynamic stability  - Monitor arterial and/or venous puncture sites for bleeding and/or hematoma  - Assess quality of pulses, skin color and temperature  - Assess for signs of decreased coronary artery perfusion - ex. Angina  - Evaluate fluid balance, assess for edema, trend weights  Outcome: Progressing  Goal: Absence of cardiac arrhythmias or at baseline  Description: INTERVENTIONS:  - Continuous cardiac monitoring, monitor vital signs, obtain 12 lead EKG if indicated  - Evaluate effectiveness of antiarrhythmic and heart rate control medications as ordered  - Initiate emergency measures for life threatening arrhythmias  - Monitor electrolytes and administer replacement therapy as ordered  Outcome: Progressing     Problem: SKIN/TISSUE INTEGRITY - ADULT  Goal: Skin integrity remains intact  Description: INTERVENTIONS  - Assess and document risk factors for pressure ulcer development  - Assess and document skin integrity  - Monitor for areas of redness and/or skin breakdown  - Initiate interventions, skin care algorithm/standards of care as needed  Outcome: Progressing     Problem: Patient/Family Goals  Goal: Patient/Family Long Term Goal  Description: Patient's Long Term Goal: Stay out of hospital    Interventions:  - Test ordered  - Monitor on tele  - Monitor labs  - See  additional Care Plan goals for specific interventions  Outcome: Progressing  Goal: Patient/Family Short Term Goal  Description: Patient's Short Term Goal: Fel better and go home    Interventions:   - Take medications as prescribed  - Participate in the plan of care  - See additional Care Plan goals for specific interventions  Outcome: Progressing     Problem: SAFETY ADULT - FALL  Goal: Free from fall injury  Description: INTERVENTIONS:  - Assess pt frequently for physical needs  - Identify cognitive and physical deficits and behaviors that affect risk of falls.  - Thornton fall precautions as indicated by assessment.  - Educate pt/family on patient safety including physical limitations  - Instruct pt to call for assistance with activity based on assessment  - Modify environment to reduce risk of injury  - Provide assistive devices as appropriate  - Consider OT/PT consult to assist with strengthening/mobility  - Encourage toileting schedule  Outcome: Progressing

## 2024-04-13 NOTE — ED QUICK NOTES
Orders for admission, patient is aware of plan and ready to go upstairs. Any questions, please call ED RN Sammie at extension [phone].     Patient Covid vaccination status: Fully vaccinated     COVID Test Ordered in ED: None    COVID Suspicion at Admission: N/A    Running Infusions:  None    Mental Status/LOC at time of transport: A&Ox2-3    Other pertinent information: patient has some confusion   CIWA score: N/A   NIH score:  N/A

## 2024-04-14 LAB
ALBUMIN SERPL-MCNC: 2.9 G/DL (ref 3.4–5)
ALBUMIN/GLOB SERPL: 1 {RATIO} (ref 1–2)
ALP LIVER SERPL-CCNC: 94 U/L
ALT SERPL-CCNC: 30 U/L
ANION GAP SERPL CALC-SCNC: 2 MMOL/L (ref 0–18)
AST SERPL-CCNC: 15 U/L (ref 15–37)
BASOPHILS # BLD AUTO: 0.01 X10(3) UL (ref 0–0.2)
BASOPHILS NFR BLD AUTO: 0.3 %
BILIRUB SERPL-MCNC: 0.6 MG/DL (ref 0.1–2)
BUN BLD-MCNC: 23 MG/DL (ref 9–23)
CALCIUM BLD-MCNC: 8.1 MG/DL (ref 8.5–10.1)
CHLORIDE SERPL-SCNC: 106 MMOL/L (ref 98–112)
CO2 SERPL-SCNC: 37 MMOL/L (ref 21–32)
CREAT BLD-MCNC: 1.06 MG/DL
EGFRCR SERPLBLD CKD-EPI 2021: 66 ML/MIN/1.73M2 (ref 60–?)
EOSINOPHIL # BLD AUTO: 0.04 X10(3) UL (ref 0–0.7)
EOSINOPHIL NFR BLD AUTO: 1.3 %
ERYTHROCYTE [DISTWIDTH] IN BLOOD BY AUTOMATED COUNT: 15.5 %
GLOBULIN PLAS-MCNC: 2.9 G/DL (ref 2.8–4.4)
GLUCOSE BLD-MCNC: 97 MG/DL (ref 70–99)
HCT VFR BLD AUTO: 36.8 %
HGB BLD-MCNC: 12 G/DL
IMM GRANULOCYTES # BLD AUTO: 0.01 X10(3) UL (ref 0–1)
IMM GRANULOCYTES NFR BLD: 0.3 %
LYMPHOCYTES # BLD AUTO: 0.65 X10(3) UL (ref 1–4)
LYMPHOCYTES NFR BLD AUTO: 21.2 %
MAGNESIUM SERPL-MCNC: 1.9 MG/DL (ref 1.6–2.6)
MCH RBC QN AUTO: 31.7 PG (ref 26–34)
MCHC RBC AUTO-ENTMCNC: 32.6 G/DL (ref 31–37)
MCV RBC AUTO: 97.1 FL
MONOCYTES # BLD AUTO: 0.5 X10(3) UL (ref 0.1–1)
MONOCYTES NFR BLD AUTO: 16.3 %
NEUTROPHILS # BLD AUTO: 1.86 X10 (3) UL (ref 1.5–7.7)
NEUTROPHILS # BLD AUTO: 1.86 X10(3) UL (ref 1.5–7.7)
NEUTROPHILS NFR BLD AUTO: 60.6 %
NT-PROBNP SERPL-MCNC: 1060 PG/ML (ref ?–450)
OSMOLALITY SERPL CALC.SUM OF ELEC: 304 MOSM/KG (ref 275–295)
PLATELET # BLD AUTO: 122 10(3)UL (ref 150–450)
POTASSIUM SERPL-SCNC: 3.7 MMOL/L (ref 3.5–5.1)
PROT SERPL-MCNC: 5.8 G/DL (ref 6.4–8.2)
Q-T INTERVAL: 464 MS
QRS DURATION: 90 MS
QTC CALCULATION (BEZET): 470 MS
R AXIS: 10 DEGREES
RBC # BLD AUTO: 3.79 X10(6)UL
SODIUM SERPL-SCNC: 145 MMOL/L (ref 136–145)
T AXIS: -17 DEGREES
VENTRICULAR RATE: 62 BPM
WBC # BLD AUTO: 3.1 X10(3) UL (ref 4–11)

## 2024-04-14 PROCEDURE — 83735 ASSAY OF MAGNESIUM: CPT | Performed by: HOSPITALIST

## 2024-04-14 PROCEDURE — 97166 OT EVAL MOD COMPLEX 45 MIN: CPT

## 2024-04-14 PROCEDURE — 83880 ASSAY OF NATRIURETIC PEPTIDE: CPT | Performed by: INTERNAL MEDICINE

## 2024-04-14 PROCEDURE — 85025 COMPLETE CBC W/AUTO DIFF WBC: CPT | Performed by: HOSPITALIST

## 2024-04-14 PROCEDURE — 80053 COMPREHEN METABOLIC PANEL: CPT | Performed by: INTERNAL MEDICINE

## 2024-04-14 PROCEDURE — 97535 SELF CARE MNGMENT TRAINING: CPT

## 2024-04-14 RX ORDER — POTASSIUM CHLORIDE 1.5 G/1.58G
40 POWDER, FOR SOLUTION ORAL ONCE
Status: COMPLETED | OUTPATIENT
Start: 2024-04-14 | End: 2024-04-14

## 2024-04-14 NOTE — PROGRESS NOTES
[facility] Progress Note    Micheal Nickerson Patient Status:  Inpatient    1931 MRN [id number]   [facility] 8NE-A Attending Avery Khan MD   Hosp Day # 2 PCP Thuan Moody MD     Outpatient cardiologist: Ross  Reason for initial consult: swelling    Subjective:  No issues- sitting in chair    Assessment and Plan:    Anasarca/  Pleural effusion/ HFpEF acute on chronic  PH est PA pressures 45  Neg troponin   Dry wt:  142 10-23 (down 2 lbs)  BNP elevated- goal 500 or less for this pt  Atrial fibrillation, chronic (HCC) slow Vent HR   but had > 4 second pause overnight  HTN  Running low now CAD (calcium seen on CT of chest)   Thrombocytopenia (HCC)/Leukopenia/ anemia with Iron dieficency   IV Iron may help CHF   Candidiasis of genitalia            PLAN:   Cont IVP Lasix 40mg bid  Stop amloidipine and stop  lisinopril to allow better BP to eliminate issues with WRF due to low perfusion  Monitor HR on tele (remove PPM pads/ crash cart). Hold metoprolol. Avoid meds that lower HR   EP to see in am- \"no way cookie' \" to PPM per pt     Medications:   lisinopril  20 mg Oral Daily    furosemide  40 mg Intravenous BID (Diuretic)    terazosin  10 mg Oral Nightly    apixaban  2.5 mg Oral BID    montelukast  10 mg Oral Nightly       Continuous Infusions:      Allergies:  Allergies   Allergen Reactions    Contrast Dye [Gadolinium Derivatives]        Intake/Output:    Intake/Output Summary (Last 24 hours) at 2024 0756  Last data filed at 2024 0610  Gross per 24 hour   Intake 520 ml   Output 2800 ml   Net -2280 ml       Last 3 Weights   24 0445 146 lb 6.2 oz (66.4 kg)   24 0537 146 lb 4.8 oz (66.4 kg)   24 2246 148 lb 13 oz (67.5 kg)   24 1446 151 lb 14.4 oz (68.9 kg)   23 0700 152 lb (68.9 kg)   23 0224 152 lb (68.9 kg)       Physical Exam:  Temp:  [97 °F (36.1 °C)-98.2 °F (36.8 °C)] 98.2 °F (36.8 °C)  Pulse:  [58-89] 82  Resp:   [18-20] 19  BP: ()/(58-85) 138/85  SpO2:  [92 %-98 %] 96 %    Physical Exam:  General: No acute distress. Alert and oriented x 2, not date  HEENT: Normocephalic atraumatic. Moist mucous membranes. EOM-I  Neck: +JVD. No carotid bruits.  Respiratory: Clear to auscultation bilaterally. No wheezes. No crackles  Cardiovascular: S1, S2. Irreg, irreg  Chest and Back: No tenderness or deformity.  Neurologic: No focal neurological deficits. CNII-XII grossly intact. Sensation and strength intact  Musculoskeletal: Moves all extremities.  Extremities: 1+ edema of the LE, no tenderness of the LE  Integument: No new rashes or lesions.   Psychiatric: Appropriate mood and affect.    Diagnostics:  Conclusions:     1. Study data: Atrial fibrillation with slow ventricular response   2. Left ventricle: The cavity size was normal. Wall thickness was normal.      Systolic function was normal. The estimated ejection fraction was 55-60%,      by visual assessment. No diagnostic evidence for regional wall motion      abnormalities. Unable to assess LV diastolic function due to heart      rhythm.   3. Left atrium: The left atrial volume was markedly increased.   4. Right atrium: The atrium was moderately dilated.   5. Mitral valve: There was mild regurgitation.   6. Tricuspid valve: There was moderate regurgitation.   7. Pulmonary arteries: Systolic pressure was mildly increased, in the range      of 40mm Hg to 45mm Hg. Estimated pulmonary artery diastolic pressure was      24mm Hg.   8. Pericardium, extracardiac: A small pericardial effusion was identified.      There was no evidence of hemodynamic compromise. There was a small right      pleural effusion.   Impressions:  No previous study from [facility] was   available for comparison.   *      Tele: Afib with slow vent rate, pauses of 3.5 sec    Labs:  HEM:  Recent Labs   Lab 04/11/24  0728 04/12/24  1455 04/13/24  0541 04/14/24  0459   WBC 2.0* 2.7* 1.8* 3.1*   HGB  11.3* 11.0* 11.2* 12.0*   .0* 133.0* 109.0* 122.0*       Chem:  Recent Labs   Lab 04/11/24  0728 04/12/24  1455 04/13/24  0541 04/14/24  0459    146* 145 145   K 3.9 3.8 3.6 3.7    112 112 106   CO2 29.0 29.0 29.0 37.0*   BUN 24* 27* 25* 23   CREATSERUM 1.07 1.19 0.92 1.06   CA 8.2* 8.4* 8.4* 8.1*   MG  --  2.3  --  1.9   GLU 80 61* 71 97       Recent Labs   Lab 04/11/24  0728 04/12/24  1455 04/14/24  0459   ALT 34 33 30   AST 17 21 15   ALB 3.1* 2.9* 2.9*       No results for input(s): \"TROP\", \"CK\" in the last 168 hours.    No results for input(s): \"PTP\", \"INR\" in the last 168 hours.      Angie Self, IZABEL  4/14/2024  7:56 AM

## 2024-04-14 NOTE — PLAN OF CARE
Patient alert and oriented x 4, confused at times. Maintaining O2 saturation WNL on room air. Afib on tele monitor,  two episodes of HR in 150's not sustaining, the lowest HR 43 overnight. Vital signs stable, afebrile. Patient incontinent. Ambulating with 1 person and walker. Safety precautions in place, call light within reach, bed alarm on, son in law at the bedside. All needs met at this time.         Problem: CARDIOVASCULAR - ADULT  Goal: Maintains optimal cardiac output and hemodynamic stability  Description: INTERVENTIONS:  - Monitor vital signs, rhythm, and trends  - Monitor for bleeding, hypotension and signs of decreased cardiac output  - Evaluate effectiveness of vasoactive medications to optimize hemodynamic stability  - Monitor arterial and/or venous puncture sites for bleeding and/or hematoma  - Assess quality of pulses, skin color and temperature  - Assess for signs of decreased coronary artery perfusion - ex. Angina  - Evaluate fluid balance, assess for edema, trend weights  Outcome: Progressing  Goal: Absence of cardiac arrhythmias or at baseline  Description: INTERVENTIONS:  - Continuous cardiac monitoring, monitor vital signs, obtain 12 lead EKG if indicated  - Evaluate effectiveness of antiarrhythmic and heart rate control medications as ordered  - Initiate emergency measures for life threatening arrhythmias  - Monitor electrolytes and administer replacement therapy as ordered  Outcome: Progressing     Problem: SKIN/TISSUE INTEGRITY - ADULT  Goal: Skin integrity remains intact  Description: INTERVENTIONS  - Assess and document risk factors for pressure ulcer development  - Assess and document skin integrity  - Monitor for areas of redness and/or skin breakdown  - Initiate interventions, skin care algorithm/standards of care as needed  Outcome: Progressing     Problem: Patient/Family Goals  Goal: Patient/Family Long Term Goal  Description: Patient's Long Term Goal: Stay out of  hospital    Interventions:  - Test ordered  - Monitor on tele  - Monitor labs  - See additional Care Plan goals for specific interventions  Outcome: Progressing  Goal: Patient/Family Short Term Goal  Description: Patient's Short Term Goal: Fel better and go home    Interventions:   - Take medications as prescribed  - Participate in the plan of care  - See additional Care Plan goals for specific interventions  Outcome: Progressing

## 2024-04-14 NOTE — PROGRESS NOTES
[facility] Hospitalist Progress Note                                                                     [facility]   [facility] of [facility]      Micheal Nickerson  12/30/1931    SUBJECTIVE: no chest pain, palpitations, shortness of breath, cough, nausea, vomiting, abdominal pain. Patient feeling better. Swelling improving.     OBJECTIVE:  Temp:  [97 °F (36.1 °C)-98.2 °F (36.8 °C)] 98.2 °F (36.8 °C)  Pulse:  [58-89] 82  Resp:  [18-20] 19  BP: ()/(58-85) 138/85  SpO2:  [92 %-98 %] 96 %  Exam  Gen: No acute distress, alert and oriented   Pulm: Lungs clear bilaterally, normal respiratory effort, no crackles, no wheezing  CV: Heart with regular rate and rhythm, no murmur.  Abd: Abdomen soft, nontender, nondistended, bowel sounds present  MSK: No tenderness of the LE  Skin: no new rashes or lesions    Labs:   Recent Labs   Lab 04/11/24  0728 04/12/24  1455 04/13/24  0541 04/14/24  0459   WBC 2.0* 2.7* 1.8* 3.1*   HGB 11.3* 11.0* 11.2* 12.0*   MCV 98.6 99.7 96.6 97.1   .0* 133.0* 109.0* 122.0*       Recent Labs   Lab 04/11/24  0728 04/12/24  1455 04/13/24  0541 04/14/24  0459    146* 145 145   K 3.9 3.8 3.6 3.7    112 112 106   CO2 29.0 29.0 29.0 37.0*   BUN 24* 27* 25* 23   CREATSERUM 1.07 1.19 0.92 1.06   CA 8.2* 8.4* 8.4* 8.1*   MG  --  2.3  --  1.9   GLU 80 61* 71 97       Recent Labs   Lab 04/11/24  0728 04/12/24  1455 04/14/24  0459   ALT 34 33 30   AST 17 21 15   ALB 3.1* 2.9* 2.9*       Recent Labs   Lab 04/12/24  1634   PGLU 80       Meds:   Scheduled:    lisinopril  20 mg Oral Daily    furosemide  40 mg Intravenous BID (Diuretic)    terazosin  10 mg Oral Nightly    apixaban  2.5 mg Oral BID    montelukast  10 mg Oral Nightly     Continuous Infusions:   PRN:   acetaminophen    ondansetron    ASSESSMENT / PLAN:   89 year old male with history of atrial fibrillation, prostate cancer, hx c-diff, hx diverticulosis, htn and merkel cell carcinoma s/p  radiation presenting with increased swelling of the testicle.      Testicular swelling--> improving  -likely related to fluid overload  -testicular US shows no acute pathology  -monitor     LE edema--> improving  -echo shows no significant systolic HF  -pt with increased BNP  -likely HFpEF.   -cardiology following  -iv lasix  -strict I/o     Pancytopenia   -if worsens may need to consider inpt evaluation otherwise outpt Heme follow up     Confusion--> improved  -pt more alert now  -likely progression of dementia  -no obvious signs of infection  -pt on AC so unlikely cva related, also no other focal signs  -monitor clinically      Bradycardia  Pauses  -tele  -hold rate controlling meds  -cardiology following --> EP evaluation      Atrial fibrillation  -rate controlled  -hold metoprolol due to pauses  -on eliquis     HTN  -sbp stable  -metoprolol on hold due to bradycardia and pauses  -amlodipine--> held by cardiology   -lisinopril --> held by cardiology      BPH  -terazosin     Quality:  DVT Prophylaxis: eliquis  CODE status: full code, daughter to discuss dnr with pt and family  Ashley: external      Plan of care discussed with patient and staff     Dispo: no discharge.      Avery Khan MD  [facility] Hospitalist  [phone]

## 2024-04-14 NOTE — OCCUPATIONAL THERAPY NOTE
OCCUPATIONAL THERAPY EVALUATION - INPATIENT     Room Number: 8622/8622-A  Evaluation Date: 4/14/2024  Type of Evaluation: Initial  Presenting Problem: pleural effusion, hydrocele, anasarca, thrombocytopenia    Physician Order: IP Consult to Occupational Therapy  Reason for Therapy: ADL/IADL Dysfunction and Discharge Planning    OCCUPATIONAL THERAPY ASSESSMENT   Patient is currently functioning near baseline with toileting, bathing, upper body dressing, lower body dressing, bed mobility, and transfers. Prior to admission, patient's baseline is using a RW and pt has assist with dressing, sponge bathing and occasionally vera-care.  Patient is requiring moderate assist as a result of the following impairments: decreased functional strength, decreased functional reach, decreased endurance, impaired standing balance, and cognitive deficits (limited safety awareness, poor STM, decreased ability to sequence). Occupational Therapy will continue to follow for duration of hospitalization.    Patient will benefit from continued skilled OT Services at discharge to promote functional independence in home.  Anticipate patient will return home with home health OT      History Related to Current Admission: Patient is a 89 year old male admitted on 4/12/2024 with Presenting Problem: pleural effusion, hydrocele, anasarca, thrombocytopenia. Co-Morbidities : advanced dementia, metastatic Merkel cell CA, HTN    Recommendations for nursing staff:   Transfers: min A with RW  Toileting location: toilet     EVALUATION SESSION  Patient Start of Session: Pt was found in his bathroom, toileting with his PCT.   FUNCTIONAL TRANSFER ASSESSMENT  Toilet Transfer: Minimal Assist    BED MOBILITY     BALANCE ASSESSMENT     FUNCTIONAL ADL ASSESSMENT  Toileting Seated: Supervision  Toileting Standing: Maximum Assist      ACTIVITY TOLERANCE:                          O2 SATURATIONS       Cognition  Poor STM  Pt repeating himself frequently   Perseverative  on using the bathroom and his KARINA being upset with him       Upper extremity  WFL    EDUCATION PROVIDED  Patient: Role of Occupational Therapy; Plan of Care; Discharge Recommendations  Patient's Response to Education: Verbalized Understanding  Family/Caregiver: Role of Occupational Therapy; Plan of Care; Discharge Recommendations  Family/Caregiver's Response to Education: Verbalized Understanding      Equipment used: RW, gait belt   Demonstrates functional use, Would benefit from additional trial     Therapist comments: sitting on the toilet with his PCT present>stand to RW x 2 trials due to pt having the urge to go to the bathroom>sitting>stand>assist for vera-care and depends management>stand to RW>walk to sit in chair with PCT and KARINA present     Patient End of Session: Up in chair;Call light within reach;Needs met;RN aware of session/findings;All patient questions and concerns addressed;Alarm set;With  staff;Family present    OCCUPATIONAL PROFILE    HOME SITUATION  Type of Home: House  Home Layout: Two level;Able to live on main level  Lives With: Daughter;Family    Toilet and Equipment: Standard height toilet  Shower/Tub and Equipment: Other (Comment) (family assists with sponge bath)  Other Equipment: Other (Comment) (RW)    Occupation/Status: Retired     Drives: No  Patient Regularly Uses: Glasses    Prior Level of Function: Pt lives with his daughter and KARINA who are his primary caregivers. Pt's KARINA reports that for the past 3 weeks, they have been assisting with sponge bathing, dressing and occasionally vera-care for BMs. Pt's KARINA reports that pt's dementia is advanced at this point and that they have to repeat things very frequently and pt at times does not recognize his own daughters. Pt uses a RW at home. Pt spends a lot of time in the bathroom (sometimes upwards of 3.5 hours due to frequently having the urge to use the bathroom) Pt's daughter and KARINA have converted their dining room into a bedroom for  the pt in the 1st floor.     SUBJECTIVE   Pt prefers to be called Grandpa     \"My son in law is going to be upset with me if I don't go to the bathroom. He's going to be all bent out of shape.\" Per pt    \"My wife tells me that I have to have patience but it's hard.\" Per pt's KARINA         PAIN ASSESSMENT  Ratin  Location: Pt denies pain       OBJECTIVE  Precautions: Hard of hearing;Bed/chair alarm  Fall Risk: High fall risk    WEIGHT BEARING RESTRICTION  Weight Bearing Restriction: None                ASSESSMENTS    AM-PAC ‘6-Clicks’ Inpatient Daily Activity Short Form  -   Putting on and taking off regular lower body clothing?: A Lot  -   Bathing (including washing, rinsing, drying)?: A Lot  -   Toileting, which includes using toilet, bedpan or urinal? : A Lot  -   Putting on and taking off regular upper body clothing?: A Little  -   Taking care of personal grooming such as brushing teeth?: A Little  -   Eating meals?: None    AM-PAC Score:  Score: 16  Approx Degree of Impairment: 53.32%  Standardized Score (AM-PAC Scale): 35.96    PLAN  OT Treatment Plan: Balance activities;Energy conservation/work simplification techniques;ADL training;Functional transfer training;Endurance training;Patient/Family education;Patient/Family training;Equipment eval/education;Compensatory technique education;Continued evaluation  Rehab Potential : Fair  Frequency: 3x/week  Number of Visits to Meet Established Goals: 3    ADL Goals  Patient will perform toileting with min A and AE PRN  Patient will perform LB dressing with min A and AE PRN    Functional Transfer Goals  Patient will perform bed mobility supine to sit with supervision  Patient will perform bed mobility sit to supine with supervision  Patient will perform toilet transfer with min A        Patient Evaluation Complexity Level:   Occupational Profile/Medical History MODERATE - Expanded review of history including review of medical or therapy record   Specific performance  deficits impacting engagement in ADL/IADL MODERATE  3 - 5 performance deficits   Client Assessment/Performance Deficits MODERATE - Comorbidities and min to mod modifications of tasks    Clinical Decision Making MODERATE - Analysis of occupational profile, detailed assessments, several treatment options    Overall Complexity MODERATE     OT Session Time: 30 minutes  Self-Care Home Management: 15 minutes

## 2024-04-14 NOTE — PLAN OF CARE
Patient alert and oriented x 2-3. Up with x1 assist using a walker. On RA. A fib on tele. Continent of bowel and bladder. No complaints of pain, shortness of breath, or chest pain/discomfort. POC discussed with patient and family at the bedside. Fall precautions in place. Call light within reach.     Problem: CARDIOVASCULAR - ADULT  Goal: Maintains optimal cardiac output and hemodynamic stability  Description: INTERVENTIONS:  - Monitor vital signs, rhythm, and trends  - Monitor for bleeding, hypotension and signs of decreased cardiac output  - Evaluate effectiveness of vasoactive medications to optimize hemodynamic stability  - Monitor arterial and/or venous puncture sites for bleeding and/or hematoma  - Assess quality of pulses, skin color and temperature  - Assess for signs of decreased coronary artery perfusion - ex. Angina  - Evaluate fluid balance, assess for edema, trend weights  Outcome: Progressing  Goal: Absence of cardiac arrhythmias or at baseline  Description: INTERVENTIONS:  - Continuous cardiac monitoring, monitor vital signs, obtain 12 lead EKG if indicated  - Evaluate effectiveness of antiarrhythmic and heart rate control medications as ordered  - Initiate emergency measures for life threatening arrhythmias  - Monitor electrolytes and administer replacement therapy as ordered  Outcome: Progressing     Problem: SKIN/TISSUE INTEGRITY - ADULT  Goal: Skin integrity remains intact  Description: INTERVENTIONS  - Assess and document risk factors for pressure ulcer development  - Assess and document skin integrity  - Monitor for areas of redness and/or skin breakdown  - Initiate interventions, skin care algorithm/standards of care as needed  Outcome: Progressing     Problem: Patient/Family Goals  Goal: Patient/Family Long Term Goal  Description: Patient's Long Term Goal: Stay out of hospital    Interventions:  - Test ordered  - Monitor on tele  - Monitor labs  - See additional Care Plan goals for specific  interventions  Outcome: Progressing  Goal: Patient/Family Short Term Goal  Description: Patient's Short Term Goal: Fel better and go home    Interventions:   - Take medications as prescribed  - Participate in the plan of care  - See additional Care Plan goals for specific interventions  Outcome: Progressing     Problem: SAFETY ADULT - FALL  Goal: Free from fall injury  Description: INTERVENTIONS:  - Assess pt frequently for physical needs  - Identify cognitive and physical deficits and behaviors that affect risk of falls.  - [place] fall precautions as indicated by assessment.  - Educate pt/family on patient safety including physical limitations  - Instruct pt to call for assistance with activity based on assessment  - Modify environment to reduce risk of injury  - Provide assistive devices as appropriate  - Consider OT/PT consult to assist with strengthening/mobility  - Encourage toileting schedule  Outcome: Progressing

## 2024-04-15 LAB
ANION GAP SERPL CALC-SCNC: 1 MMOL/L (ref 0–18)
BASOPHILS # BLD AUTO: 0.01 X10(3) UL (ref 0–0.2)
BASOPHILS NFR BLD AUTO: 0.3 %
BUN BLD-MCNC: 28 MG/DL (ref 9–23)
CALCIUM BLD-MCNC: 8.2 MG/DL (ref 8.5–10.1)
CHLORIDE SERPL-SCNC: 105 MMOL/L (ref 98–112)
CO2 SERPL-SCNC: 37 MMOL/L (ref 21–32)
CREAT BLD-MCNC: 1.06 MG/DL
EGFRCR SERPLBLD CKD-EPI 2021: 66 ML/MIN/1.73M2 (ref 60–?)
EOSINOPHIL # BLD AUTO: 0.04 X10(3) UL (ref 0–0.7)
EOSINOPHIL NFR BLD AUTO: 1.3 %
ERYTHROCYTE [DISTWIDTH] IN BLOOD BY AUTOMATED COUNT: 15.4 %
GLUCOSE BLD-MCNC: 101 MG/DL (ref 70–99)
HCT VFR BLD AUTO: 36.4 %
HGB BLD-MCNC: 12.2 G/DL
IMM GRANULOCYTES # BLD AUTO: 0.01 X10(3) UL (ref 0–1)
IMM GRANULOCYTES NFR BLD: 0.3 %
LYMPHOCYTES # BLD AUTO: 0.74 X10(3) UL (ref 1–4)
LYMPHOCYTES NFR BLD AUTO: 24.4 %
MAGNESIUM SERPL-MCNC: 2.1 MG/DL (ref 1.6–2.6)
MCH RBC QN AUTO: 32.1 PG (ref 26–34)
MCHC RBC AUTO-ENTMCNC: 33.5 G/DL (ref 31–37)
MCV RBC AUTO: 95.8 FL
MONOCYTES # BLD AUTO: 0.58 X10(3) UL (ref 0.1–1)
MONOCYTES NFR BLD AUTO: 19.1 %
NEUTROPHILS # BLD AUTO: 1.65 X10 (3) UL (ref 1.5–7.7)
NEUTROPHILS # BLD AUTO: 1.65 X10(3) UL (ref 1.5–7.7)
NEUTROPHILS NFR BLD AUTO: 54.6 %
NT-PROBNP SERPL-MCNC: 890 PG/ML (ref ?–450)
OSMOLALITY SERPL CALC.SUM OF ELEC: 302 MOSM/KG (ref 275–295)
PLATELET # BLD AUTO: 124 10(3)UL (ref 150–450)
PLATELETS.RETICULATED NFR BLD AUTO: 4.1 % (ref 0–7)
POTASSIUM SERPL-SCNC: 3.8 MMOL/L (ref 3.5–5.1)
POTASSIUM SERPL-SCNC: 3.8 MMOL/L (ref 3.5–5.1)
RBC # BLD AUTO: 3.8 X10(6)UL
SODIUM SERPL-SCNC: 143 MMOL/L (ref 136–145)
WBC # BLD AUTO: 3 X10(3) UL (ref 4–11)

## 2024-04-15 PROCEDURE — 80048 BASIC METABOLIC PNL TOTAL CA: CPT | Performed by: INTERNAL MEDICINE

## 2024-04-15 PROCEDURE — 97116 GAIT TRAINING THERAPY: CPT

## 2024-04-15 PROCEDURE — 97110 THERAPEUTIC EXERCISES: CPT

## 2024-04-15 PROCEDURE — 84132 ASSAY OF SERUM POTASSIUM: CPT | Performed by: HOSPITALIST

## 2024-04-15 PROCEDURE — 83880 ASSAY OF NATRIURETIC PEPTIDE: CPT | Performed by: INTERNAL MEDICINE

## 2024-04-15 PROCEDURE — 85025 COMPLETE CBC W/AUTO DIFF WBC: CPT | Performed by: HOSPITALIST

## 2024-04-15 PROCEDURE — 83735 ASSAY OF MAGNESIUM: CPT | Performed by: HOSPITALIST

## 2024-04-15 RX ORDER — HYDROCODONE BITARTRATE AND ACETAMINOPHEN 5; 325 MG/1; MG/1
1 TABLET ORAL 2 TIMES DAILY PRN
Status: DISCONTINUED | OUTPATIENT
Start: 2024-04-15 | End: 2024-04-16

## 2024-04-15 RX ORDER — POTASSIUM CHLORIDE 20 MEQ/1
40 TABLET, EXTENDED RELEASE ORAL ONCE
Status: COMPLETED | OUTPATIENT
Start: 2024-04-15 | End: 2024-04-15

## 2024-04-15 NOTE — PROGRESS NOTES
[facility] Cardiology Progress Note        Micheal Nickerson Patient Status:  Inpatient    1931 MRN [id number]   Location [facility] 8NE-A Attending Helder Machado,    Hosp Day # 3 PCP Thuan Moody MD     Subjective:  The patient denies  chest pain and shortness of breath.    Medications:   [Held by provider] lisinopril  20 mg Oral Daily    furosemide  40 mg Intravenous BID (Diuretic)    terazosin  10 mg Oral Nightly    apixaban  2.5 mg Oral BID    montelukast  10 mg Oral Nightly       Continuous Infusions:        Allergies:  Allergies   Allergen Reactions    Contrast Dye [Gadolinium Derivatives]          Objective:        Intake/Output:      Intake/Output Summary (Last 24 hours) at 4/15/2024 0841  Last data filed at 2024 2322  Gross per 24 hour   Intake 240 ml   Output 1100 ml   Net -860 ml     Wt Readings from Last 3 Encounters:   24 146 lb 6.2 oz (66.4 kg)   23 152 lb (68.9 kg)   23 152 lb (68.9 kg)       Physical Exam:        Vitals:    24 1939 24 2322 04/15/24 0441 04/15/24 0736   BP: 136/89 95/69 107/68 131/78   BP Location: Left arm Left arm Left arm Left arm   Pulse: 85 72 87 75   Resp: 20 20 20 20   Temp: 98.3 °F (36.8 °C) 97.9 °F (36.6 °C) 98.4 °F (36.9 °C) 98 °F (36.7 °C)   TempSrc: Oral Oral Oral Oral   SpO2:    91%   Weight:       Height:           Temp:  [97.9 °F (36.6 °C)-98.6 °F (37 °C)] 98 °F (36.7 °C)  Pulse:  [72-88] 75  Resp:  [18-20] 20  BP: ()/(61-89) 131/78  SpO2:  [91 %-98 %] 91 %      Temp: 98 °F (36.7 °C)  Pulse: 75  Resp: 20  BP: 131/78  General:  Appears comfortable  HEENT: No focal deficits.  Neck: No JVD, carotids 2+ no bruits.  Cardiac: Irregular S1S2.  No S3, S4, rub, click.  No murmur.  Lungs: Clear to auscultation and percussion.  Abdomen: Soft, non-tender.   Extremities: 2+ bilateral LE edema.  No clubbing or cyanosis.    Neurologic: Alert and oriented, normal affect.  Skin: Warm and dry.            LABS:      HEM:  Recent Labs   Lab 04/11/24  0728 04/12/24  1455 04/13/24  0541 04/14/24  0459 04/15/24  0422   WBC 2.0* 2.7* 1.8* 3.1* 3.0*   HGB 11.3* 11.0* 11.2* 12.0* 12.2*   HCT 34.6* 34.2* 33.9* 36.8* 36.4*   .0* 133.0* 109.0* 122.0* 124.0*       Chem:  Recent Labs   Lab 04/11/24  0728 04/12/24  1455 04/13/24  0541 04/14/24  0459 04/15/24  0422    146* 145 145 143   K 3.9 3.8 3.6 3.7 3.8  3.8    112 112 106 105   CO2 29.0 29.0 29.0 37.0* 37.0*   BUN 24* 27* 25* 23 28*   CREATSERUM 1.07 1.19 0.92 1.06 1.06   CA 8.2* 8.4* 8.4* 8.1* 8.2*   MG  --  2.3  --  1.9 2.1   GLU 80 61* 71 97 101*       Recent Labs   Lab 04/11/24  0728 04/12/24  1455 04/14/24 0459   ALT 34 33 30   AST 17 21 15   ALB 3.1* 2.9* 2.9*       No results for input(s): \"PT\", \"PTT\", \"INR\" in the last 168 hours.        Lab Results   Component Value Date    TROP <0.046 02/01/2016         Invalid input(s): \"PBNPML\"                       Diagnostics:   Telemetry: Atrial Fibrillation, 80's    EKG, 4/15/2024,         Echo: 4/11/24    1. Study data: Atrial fibrillation with slow ventricular response   2. Left ventricle: The cavity size was normal. Wall thickness was normal.      Systolic function was normal. The estimated ejection fraction was 55-60%,      by visual assessment. No diagnostic evidence for regional wall motion      abnormalities. Unable to assess LV diastolic function due to heart      rhythm.   3. Left atrium: The left atrial volume was markedly increased.   4. Right atrium: The atrium was moderately dilated.   5. Mitral valve: There was mild regurgitation.   6. Tricuspid valve: There was moderate regurgitation.   7. Pulmonary arteries: Systolic pressure was mildly increased, in the range      of 40mm Hg to 45mm Hg. Estimated pulmonary artery diastolic pressure was      24mm Hg.   8. Pericardium, extracardiac: A small pericardial effusion was identified.      There was no evidence of hemodynamic compromise.  There was a small right      pleural effusion.   Impressions:  No previous study from [facility] was   available for comparison.     Cardiac Cath:              Impression:      Anasarca/  Pleural effusion/ HFpEF acute on chronic  PH est PA pressures 45    Dry wt:  142 10-23 (down 5 lbs)   Atrial fibrillation, chronic (HCC) slow Vent HR   but had > 4 second pause overnight.  asymptomatic.. family and pt do not want to explore PPM.     HTN. , well-controlled     CAD (calcium seen on CT of chest)   Thrombocytopenia (HCC)/Leukopenia/ anemia with Iron dieficency   IV Iron may help CHF   Candidiasis of genitalia            PLAN:   Cont IVP Lasix 40mg bid today. Transition to po at time of dc  Stop amloidipine.  Lisinopril for HTN/ CHF  Monitor HR on tele (remove PPM pads/ crash cart). Hold metoprolol. Avoid meds that lower HR .  He and family are not interested in PPM    Placement (dementia)           Gibson Alonso MD  4/15/2024  8:41 AM

## 2024-04-15 NOTE — PROGRESS NOTES
NURIS Hospitalist Progress Note                                                                     [facility]   part of [facility]      Micheal Nickerson  12/30/1931    SUBJECTIVE: Seen and examined at bedside.  Daughter at bedside.  Per daughter, patient lives with one of the daughters.  Per family, patient unable to walk without assistance, family uncomfortable with discharge to home at this time.    OBJECTIVE:  Temp:  [97.7 °F (36.5 °C)-98.4 °F (36.9 °C)] 97.7 °F (36.5 °C)  Pulse:  [51-87] 67  Resp:  [18-20] 18  BP: ()/(67-89) 95/79  SpO2:  [91 %-97 %] 97 %  Exam  Gen: No acute distress, alert and oriented x 2 answers all questions appropriately.  Pulm: Lungs clear bilaterally, normal respiratory effort, no crackles, no wheezing  CV: Heart with regular rate and rhythm, no murmur.  Abd: Abdomen soft, nontender, nondistended, bowel sounds present  MSK: No tenderness of the LE  Skin: no new rashes or lesions  Neuro: Gait not assessed    Labs:   Recent Labs   Lab 04/11/24  0728 04/12/24  1455 04/13/24  0541 04/14/24  0459 04/15/24  0422   WBC 2.0* 2.7* 1.8* 3.1* 3.0*   HGB 11.3* 11.0* 11.2* 12.0* 12.2*   MCV 98.6 99.7 96.6 97.1 95.8   .0* 133.0* 109.0* 122.0* 124.0*       Recent Labs   Lab 04/11/24  0728 04/12/24  1455 04/13/24  0541 04/14/24  0459 04/15/24  0422    146* 145 145 143   K 3.9 3.8 3.6 3.7 3.8  3.8    112 112 106 105   CO2 29.0 29.0 29.0 37.0* 37.0*   BUN 24* 27* 25* 23 28*   CREATSERUM 1.07 1.19 0.92 1.06 1.06   CA 8.2* 8.4* 8.4* 8.1* 8.2*   MG  --  2.3  --  1.9 2.1   GLU 80 61* 71 97 101*       Recent Labs   Lab 04/11/24  0728 04/12/24  1455 04/14/24  0459   ALT 34 33 30   AST 17 21 15   ALB 3.1* 2.9* 2.9*       Recent Labs   Lab 04/12/24  1634   PGLU 80       Meds:   Scheduled:    [Held by provider] lisinopril  20 mg Oral Daily    furosemide  40 mg Intravenous BID (Diuretic)    terazosin  10 mg Oral Nightly    apixaban   2.5 mg Oral BID    montelukast  10 mg Oral Nightly     Continuous Infusions:   PRN:   HYDROcodone-acetaminophen    acetaminophen    ondansetron    ASSESSMENT / PLAN:   89 year old male with history of atrial fibrillation, prostate cancer, hx c-diff, hx diverticulosis, htn and merkel cell carcinoma s/p radiation presenting with increased swelling of the testicle.  Managed for acute on chronic diastolic heart failure     Acute on chronic diastolic heart failure  Testicular swelling--> improving  -likely related to fluid overload  -testicular US shows no acute pathology  -monitor     LE edema--> improving  -echo shows no significant systolic HF  -pt with increased BNP  -cardiology following, recommendations reviewed-continue, follow creatinine  -iv lasix  -strict I/o     Pancytopenia   -if worsens may need to consider inpt evaluation otherwise outpt Heme follow up     Confusion--> improved  -pt more alert now  -likely progression of dementia  -no obvious signs of infection  -pt on AC so unlikely cva related, also no other focal signs  -monitor clinically      Bradycardia  Pauses  -cardiology following --> hold metoprolol, family not interested in pacemaker evaluation at this time  - Monitor on telemetry     Atrial fibrillation  -rate controlled  -hold metoprolol   -on eliquis     HTN  -sbp stable  -metoprolol on hold due to bradycardia and pauses  -amlodipine--> held by cardiology   -lisinopril --> held by cardiology      BPH  -terazosin     Quality:  DVT Prophylaxis: eliquis  CODE status: full code, daughter to discuss dnr with pt and family  Ashley: external      Plan of care discussed with patient and staff     Dispo: Discussed with social work.  Appreciate reevaluation with PT to further determine disposition.     Helder Machado DO  [facility]

## 2024-04-15 NOTE — PLAN OF CARE
Assumed patient care at 1930. Patient alert and oriented x 2-3. Maintaining O2 saturation WNL on room air. Afib on tele monitor. Continent of bladder and bowel, external cath in place for frequency due to diuretics. Safety precautions in place, call light within reach , bed alarm on, daughter at the bedside.       Problem: CARDIOVASCULAR - ADULT  Goal: Maintains optimal cardiac output and hemodynamic stability  Description: INTERVENTIONS:  - Monitor vital signs, rhythm, and trends  - Monitor for bleeding, hypotension and signs of decreased cardiac output  - Evaluate effectiveness of vasoactive medications to optimize hemodynamic stability  - Monitor arterial and/or venous puncture sites for bleeding and/or hematoma  - Assess quality of pulses, skin color and temperature  - Assess for signs of decreased coronary artery perfusion - ex. Angina  - Evaluate fluid balance, assess for edema, trend weights  Outcome: Progressing  Goal: Absence of cardiac arrhythmias or at baseline  Description: INTERVENTIONS:  - Continuous cardiac monitoring, monitor vital signs, obtain 12 lead EKG if indicated  - Evaluate effectiveness of antiarrhythmic and heart rate control medications as ordered  - Initiate emergency measures for life threatening arrhythmias  - Monitor electrolytes and administer replacement therapy as ordered  Outcome: Progressing     Problem: SKIN/TISSUE INTEGRITY - ADULT  Goal: Skin integrity remains intact  Description: INTERVENTIONS  - Assess and document risk factors for pressure ulcer development  - Assess and document skin integrity  - Monitor for areas of redness and/or skin breakdown  - Initiate interventions, skin care algorithm/standards of care as needed  Outcome: Progressing     Problem: Patient/Family Goals  Goal: Patient/Family Long Term Goal  Description: Patient's Long Term Goal: Stay out of hospital    Interventions:  - Test ordered  - Monitor on tele  - Monitor labs  - See additional Care Plan goals  for specific interventions  Outcome: Progressing  Goal: Patient/Family Short Term Goal  Description: Patient's Short Term Goal: Fel better and go home    Interventions:   - Take medications as prescribed  - Participate in the plan of care  - See additional Care Plan goals for specific interventions  Outcome: Progressing

## 2024-04-15 NOTE — PHYSICAL THERAPY NOTE
PHYSICAL THERAPY TREATMENT NOTE - INPATIENT    Room Number: 8622/8622-A     Session: 1     Number of Visits to Meet Established Goals: 3    Presenting Problem: Pleural Effusion  Co-Morbidities : advanced dementia, metastatic Merkel cell CA, HTN    PHYSICAL THERAPY MEDICAL/SOCIAL HISTORY  History related to current admission: Patient is a 89 year old male admitted on 4/12/2024 from home for Peripheral edema and scrotal swelling/anasarca.  Pt diagnosed with Pleural Effusion.        HOME SITUATION  Type of Home: House   Home Layout: Two level;Able to live on main level     Lives With: Daughter;Family  Drives: No  Patient Owned Equipment: Rolling walker;Rollator  Patient Regularly Uses: Glasses     Prior Level of [place]: Pt reports that he is able to ambulate short distance using a RW. Pt states that he usually is sitting in his chair and does not walk around often. Pt is able to perform his ADLs without assistance.      ASSESSMENT   Patient demonstrates good  progress this session, goals  remain in progress.    Patient continues to function below baseline with bed mobility, transfers, and gait.  Contributing factors to remaining limitations include decreased functional strength, decreased endurance/aerobic capacity, impaired standing balance, and decreased muscular endurance.  Next session anticipate patient to progress bed mobility, transfers, and gait.  Physical Therapy will continue to follow patient for duration of hospitalization.    Patient continues to benefit from continued skilled PT services: at discharge to promote functional independence and safety with additional support and return home with home health PT.    PLAN  PT Treatment Plan: Bed mobility;Body mechanics;Endurance;Patient education;Gait training;Strengthening;Stair training;Transfer training;Balance training  Rehab Potential : Good  Frequency (Obs): 3-5x/week    CURRENT GOALS   Goal #1 Patient is able to demonstrate supine - sit EOB @  level: modified independent      Goal #2 Patient is able to demonstrate [place] [facility] [place]: modified independent      Goal #3 Patient is able to ambulate 100 feet with assist device: walker - rolling at assistance level: modified independent      Goal #4     Goal #5     Goal #6     Goal Comments: Goals established on 2024  4/15/2024 all goals ongoing     SUBJECTIVE  \"You're going to be my  the whole time sweetheart?\"    OBJECTIVE  Precautions: Hard of hearing;Bed/chair alarm    WEIGHT BEARING RESTRICTION  Weight Bearing Restriction: None                PAIN ASSESSMENT   Rating: Unable to rate  Location: back  Management Techniques: Activity promotion;Relaxation;Repositioning    BALANCE                                                                                                                       Static Sitting: Good  Dynamic Sitting: Fair           Static Standing: Poor +  Dynamic Standing: Poor +    ACTIVITY TOLERANCE   BP sitting EOB: 99/69   BP standin/132       Pt reports some dizziness. RN notified.              O2 WALK         AM-PAC '6-Clicks' INPATIENT SHORT FORM - BASIC MOBILITY  How much difficulty does the patient currently have...  Patient Difficulty: Turning over in bed (including adjusting bedclothes, sheets and blankets)?: A Little   Patient Difficulty: Sitting down on and standing up from a chair with arms (e.g., wheelchair, bedside commode, etc.): A Little   Patient Difficulty: Moving from lying on back to sitting on the side of the bed?: A Little   How much help from another person does the patient currently need...   Help from Another: Moving to and from a bed to a chair (including a wheelchair)?: A Little   Help from Another: Need to walk in hospital room?: A Little   Help from Another: Climbing 3-5 steps with a railing?: A Little       AM-PAC Score:  Raw Score: 18   Approx Degree of Impairment: 46.58%   Standardized Score (AM-PAC Scale): 43.63    CMS Modifier (G-Code): CK    FUNCTIONAL ABILITY STATUS  Gait Assessment   Functional Mobility/Gait Assessment  Gait Assistance: Contact guard assist  Distance (ft): 100  Assistive Device: Rolling walker  Pattern: Shuffle (kyphotic posture)    Skilled Therapy Provided: Per RN okay to work with pt. Pt received in supine and was agreeable to PT session.     Bed Mobility:  Rolling: NT   Supine<>Sit: supervision, pt utilized bed rail    Sit<>Supine: NT     Transfer Mobility:  Sit<>Stand: CGA   Stand<>Sit: CGA   Gait: pt ambulated with RW and CGA    Therapist's Comments: Pt and family educated on role of therapy, goals for session, safety, fall prevention, and activity recommendations.       THERAPEUTIC EXERCISES  Lower Extremity Alternating marching  Ankle pumps  LAQ     Upper Extremity Elbow flex/ext and  - open/close     Position Sitting     Repetitions   10   Sets   2     Patient End of Session: Up in chair;Needs met;Call light within reach;RN aware of session/findings;All patient questions and concerns addressed;Alarm set;Family present    PT Session Time: 30 minutes  Gait Training: 10 minutes  Therapeutic Exercise: 13 minutes

## 2024-04-15 NOTE — DISCHARGE INSTRUCTIONS
[facility]  P: [phone]  F: [phone]    Sometimes managing your health at home requires assistance.  The [facility] team has recognized your preference to use [facility].  They can be reached by phone at [phone].  The fax number for your reference is [phone].  A representative from the home health agency will contact you or your family to schedule your first visit.            Going Home Instructions    In this section you will find the tools which will guide you through the first few days after you leave the hospital. Continued use of these tools will help you develop the skills necessary to keep your heart failure under control.       Home Care Instructions Following Heart Failure - the most important things to do every day include:     Weigh yourself  Take your medicines as prescribed  Limit your sodium (salt) and fluid intake  Know when to call your cardiologist, primary doctor, or nurse  Know when to seek emergency care    Things for You to Remember:   1. See your doctor or healthcare provider.  It is important that you attend this appointment to make sure your symptoms are under control.     2. Your recommended sodium intake is [id number] mg daily    3. Limit your fluid intake to no more than 2 liters or 64 ounces per day    4. Some exercise and activity is important to help keep your heart functioning and strong. Unless instructed not to exercise, you may walk at a slow to moderate pace for 10-15 minutes 2-3 days per week to start. Pace your activity to prevent shortness of breath or fatigue. Stop exercise if you develop chest pain, lightheadedness, or significant shortness of breath.       Call Your Cardiologist If:   You gain 2 pounds overnight or 3-4 pounds in 3-5 days  You have more difficulty breathing  You are getting more tired with normal activity  You are more short of breath lying down, or awaken at night short of breath  You have swelling of  your feet or legs  You urinate less often during the day and more often at night  You have cramps in your legs  You have blurred vision or see yellowish-green halos around objects of lights    Go to the Emergency Room If:   You have pain or tightness in your chest  You are extremely short of breath  You are coughing up pink-frothy mucus  You are traveling and develop symptoms of worsening heart failure

## 2024-04-15 NOTE — CM/SW NOTE
04/15/24 1100   CM/SW Referral Data   Referral Source Social Work (self-referral)   Reason for Referral Discharge planning   Informant Patient;Daughter   Medical Hx   Does patient have an established PCP? Yes   Patient Info   Patient's Home Environment House   Number of Levels in Home 2   Number of Stair in Home Stays on main level   Patient lives with Daughter   Patient Status Prior to Admission   Independent with ADLs and Mobility Yes   Discharge Needs   Anticipated D/C needs Caregiver services;Home health care;To be determined   Choice of Post-Acute Provider   Informed patient of right to choose their preferred provider Yes   List of appropriate post-acute services provided to patient/family with quality data No - Declined list       FUNCTIONAL ABILITY STATUS  Gait Assessment   Functional Mobility/Gait Assessment  Gait Assistance: Contact guard assist  Distance (ft): 40  Assistive Device: Rolling walker  Pattern: Shuffle     Skilled Therapy Provided      Bed Mobility:  Rolling: CGA  Supine to sit: CGA            Sit to supine: Min A              Transfer Mobility:  Sit to stand: CGA Pt was cued for proper hand placement on stable surface.            Stand to sit: CGA  Gait = CGA 40ft  using RW      Per PT eval above^^    SW met with pt, a daughter at bedside, and 2 more family members via video call to discuss discharge planning. Pt was not very engaged in conversation, SW gathered information from daughters. Pt resides at home w/ daughter, Kathie, whom works from home. Pt stays on the main level of the home. Discussed plan of care and discharge dispositions. Informed PT/OT anticipates returning home w/ HH at discharge. Discussed that pt does not meet criteria to admit to a rehab facility (only requires CGA). Explained that pt would need to have \"rehab-able\" needs to go to rehab, and pt does not. Informed family that pt's insurance would also not approve [facility] at discharge because it is not medically necessary.  DANNI informed family that any additional care outside of home health care would be an out of pocket expense. SW offered to have family talk w/ [facility] for additional care support at home, SW also provided anticipated cost for respite care at a facility ($300+ daily). Confirmed [facility] for HHC at discharge as pt has a history w/ them. DANNI also ordered a SW eval w/ [facility] for additional support at home. Referral sent to agency via [facility].     DANNI spoke with Samira at [facility]. Provided brief clinical update to Samira and requested that she speak w/ daughter Kathie: [phone]. Samira will contact daughter and offer resources.     RN updated.      &  to remain available and supportive for discharge planning needs.    Lashae Villa, MSW, LSW  Discharge Planner  [id number]

## 2024-04-15 NOTE — CM/SW NOTE
[facility]  is able to accept pt at discharge. Reserved in [facility].    Lashae Villa, MSW, LSW  Discharge Planner  [id number]

## 2024-04-15 NOTE — PLAN OF CARE
Received patient at 0730. Alert and Oriented x2. Tele Rhythm A fib. Pt on RA. Breath sounds clear/diminished. Bed is locked and in low position. Call light and personal items within reach. No C/O chest pain or shortness of breath. Pt voiding with primofit in place. K replaced. Reviewed plan of care and patient verbalizes understanding.     Plan:  Awaiting mds    Problem: CARDIOVASCULAR - ADULT  Goal: Maintains optimal cardiac output and hemodynamic stability  Description: INTERVENTIONS:  - Monitor vital signs, rhythm, and trends  - Monitor for bleeding, hypotension and signs of decreased cardiac output  - Evaluate effectiveness of vasoactive medications to optimize hemodynamic stability  - Monitor arterial and/or venous puncture sites for bleeding and/or hematoma  - Assess quality of pulses, skin color and temperature  - Assess for signs of decreased coronary artery perfusion - ex. Angina  - Evaluate fluid balance, assess for edema, trend weights  Outcome: Progressing  Goal: Absence of cardiac arrhythmias or at baseline  Description: INTERVENTIONS:  - Continuous cardiac monitoring, monitor vital signs, obtain 12 lead EKG if indicated  - Evaluate effectiveness of antiarrhythmic and heart rate control medications as ordered  - Initiate emergency measures for life threatening arrhythmias  - Monitor electrolytes and administer replacement therapy as ordered  Outcome: Progressing     Problem: SKIN/TISSUE INTEGRITY - ADULT  Goal: Skin integrity remains intact  Description: INTERVENTIONS  - Assess and document risk factors for pressure ulcer development  - Assess and document skin integrity  - Monitor for areas of redness and/or skin breakdown  - Initiate interventions, skin care algorithm/standards of care as needed  Outcome: Progressing     Problem: Patient/Family Goals  Goal: Patient/Family Long Term Goal  Description: Patient's Long Term Goal: Stay out of hospital    Interventions:  - Test ordered  - Monitor on tele  -  Monitor labs  - See additional Care Plan goals for specific interventions  Outcome: Progressing  Goal: Patient/Family Short Term Goal  Description: Patient's Short Term Goal: Fel better and go home    Interventions:   - Take medications as prescribed  - Participate in the plan of care  - See additional Care Plan goals for specific interventions  Outcome: Progressing     Problem: SAFETY ADULT - FALL  Goal: Free from fall injury  Description: INTERVENTIONS:  - Assess pt frequently for physical needs  - Identify cognitive and physical deficits and behaviors that affect risk of falls.  - [place] fall precautions as indicated by assessment.  - Educate pt/family on patient safety including physical limitations  - Instruct pt to call for assistance with activity based on assessment  - Modify environment to reduce risk of injury  - Provide assistive devices as appropriate  - Consider OT/PT consult to assist with strengthening/mobility  - Encourage toileting schedule  Outcome: Progressing

## 2024-04-16 VITALS
DIASTOLIC BLOOD PRESSURE: 76 MMHG | SYSTOLIC BLOOD PRESSURE: 90 MMHG | BODY MASS INDEX: 26.15 KG/M2 | OXYGEN SATURATION: 100 % | RESPIRATION RATE: 18 BRPM | HEIGHT: 60 IN | TEMPERATURE: 98 F | WEIGHT: 133.19 LBS | HEART RATE: 83 BPM

## 2024-04-16 LAB
ANION GAP SERPL CALC-SCNC: 5 MMOL/L (ref 0–18)
BASOPHILS # BLD AUTO: 0.02 X10(3) UL (ref 0–0.2)
BASOPHILS NFR BLD AUTO: 0.6 %
BUN BLD-MCNC: 31 MG/DL (ref 9–23)
CALCIUM BLD-MCNC: 8.5 MG/DL (ref 8.5–10.1)
CHLORIDE SERPL-SCNC: 105 MMOL/L (ref 98–112)
CO2 SERPL-SCNC: 36 MMOL/L (ref 21–32)
CREAT BLD-MCNC: 1.17 MG/DL
EGFRCR SERPLBLD CKD-EPI 2021: 58 ML/MIN/1.73M2 (ref 60–?)
EOSINOPHIL # BLD AUTO: 0.06 X10(3) UL (ref 0–0.7)
EOSINOPHIL NFR BLD AUTO: 1.7 %
ERYTHROCYTE [DISTWIDTH] IN BLOOD BY AUTOMATED COUNT: 15.5 %
GLUCOSE BLD-MCNC: 92 MG/DL (ref 70–99)
HCT VFR BLD AUTO: 37.5 %
HGB BLD-MCNC: 12.5 G/DL
IMM GRANULOCYTES # BLD AUTO: 0.01 X10(3) UL (ref 0–1)
IMM GRANULOCYTES NFR BLD: 0.3 %
LYMPHOCYTES # BLD AUTO: 0.78 X10(3) UL (ref 1–4)
LYMPHOCYTES NFR BLD AUTO: 22 %
MCH RBC QN AUTO: 32.5 PG (ref 26–34)
MCHC RBC AUTO-ENTMCNC: 33.3 G/DL (ref 31–37)
MCV RBC AUTO: 97.4 FL
MONOCYTES # BLD AUTO: 0.67 X10(3) UL (ref 0.1–1)
MONOCYTES NFR BLD AUTO: 18.9 %
NEUTROPHILS # BLD AUTO: 2 X10 (3) UL (ref 1.5–7.7)
NEUTROPHILS # BLD AUTO: 2 X10(3) UL (ref 1.5–7.7)
NEUTROPHILS NFR BLD AUTO: 56.5 %
OSMOLALITY SERPL CALC.SUM OF ELEC: 308 MOSM/KG (ref 275–295)
PLATELET # BLD AUTO: 137 10(3)UL (ref 150–450)
PLATELETS.RETICULATED NFR BLD AUTO: 4.5 % (ref 0–7)
POTASSIUM SERPL-SCNC: 4.4 MMOL/L (ref 3.5–5.1)
POTASSIUM SERPL-SCNC: 4.4 MMOL/L (ref 3.5–5.1)
RBC # BLD AUTO: 3.85 X10(6)UL
SODIUM SERPL-SCNC: 146 MMOL/L (ref 136–145)
WBC # BLD AUTO: 3.5 X10(3) UL (ref 4–11)

## 2024-04-16 PROCEDURE — 85025 COMPLETE CBC W/AUTO DIFF WBC: CPT | Performed by: STUDENT IN AN ORGANIZED HEALTH CARE EDUCATION/TRAINING PROGRAM

## 2024-04-16 PROCEDURE — 84132 ASSAY OF SERUM POTASSIUM: CPT | Performed by: STUDENT IN AN ORGANIZED HEALTH CARE EDUCATION/TRAINING PROGRAM

## 2024-04-16 PROCEDURE — 97116 GAIT TRAINING THERAPY: CPT

## 2024-04-16 PROCEDURE — 80048 BASIC METABOLIC PNL TOTAL CA: CPT | Performed by: STUDENT IN AN ORGANIZED HEALTH CARE EDUCATION/TRAINING PROGRAM

## 2024-04-16 PROCEDURE — 97530 THERAPEUTIC ACTIVITIES: CPT

## 2024-04-16 RX ORDER — FUROSEMIDE 40 MG/1
40 TABLET ORAL DAILY
Status: DISCONTINUED | OUTPATIENT
Start: 2024-04-16 | End: 2024-04-16

## 2024-04-16 RX ORDER — FUROSEMIDE 40 MG/1
40 TABLET ORAL DAILY
Qty: 30 TABLET | Refills: 0 | Status: SHIPPED | OUTPATIENT
Start: 2024-04-17 | End: 2024-04-21

## 2024-04-16 NOTE — PROGRESS NOTES
Explained discharge instructions including medications and follow-ups to the pt w family present. Verbalized understanding, IV removed, tele monitor discontinued. Will be transported via wheelchair.

## 2024-04-16 NOTE — CM/SW NOTE
Pt medically cleared for DC today. [facility] notified of DC today. DC AVS sent to agency this PM.    CM/SW will remain available for DC planning and/or support.     BARBARA Lei, CMSRN    [id number]

## 2024-04-16 NOTE — PHYSICAL THERAPY NOTE
PHYSICAL THERAPY TREATMENT NOTE - INPATIENT    Room Number: 8622/8622-A       Session: 2     Number of Visits to Meet Established Goals: 3    Presenting Problem: Pleural Effusion  Co-Morbidities : advanced dementia, metastatic Merkel cell CA, HTN    PHYSICAL THERAPY MEDICAL/SOCIAL HISTORY  History related to current admission: Patient is a 89 year old male admitted on 4/12/2024 from home for Peripheral edema and scrotal swelling/anasarca.  Pt diagnosed with Pleural Effusion.        HOME SITUATION  Type of Home: House   Home Layout: Two level;Able to live on main level     Lives With: Daughter;Family  Drives: No  Patient Owned Equipment: Rolling walker;Rollator  Patient Regularly Uses: Glasses     Prior Level of [place]: Pt reports that he is able to ambulate short distance using a RW. Pt states that he usually is sitting in his chair and does not walk around often. Pt is able to perform his ADLs without assistance.      ASSESSMENT     Patient currently does not meet criteria for skilled inpatient physical therapy services, however patient will remain on Inpatient Mobility Team and will continue with ambulation with RW to maintain current level of mobility.   The rehab aide will perform treatment activities prescribed by this physical therapist. The rehab aide will communicate with overseeing PT regarding any change in functional mobility. RN aware.     PLAN  PT Treatment Plan: Bed mobility;Body mechanics;Endurance;Patient education;Gait training;Strengthening;Stair training;Transfer training;Balance training  Rehab Potential : Good  Frequency (Obs): 3-5x/week    CURRENT GOALS     Goal #1 Patient is able to demonstrate supine - sit EOB @ level: modified independent      Goal #2 Patient is able to demonstrate transfers EOB to/from [facility] at assistance level: modified independent      Goal #3 Patient is able to ambulate 100 feet with assist device: walker - rolling at assistance level: modified independent       Goal #4     Goal #5     Goal #6     Goal Comments: Goals established on 2024 all goals ongoing at CGA level or above      SUBJECTIVE  \"Can you call lovely\"    OBJECTIVE  Precautions: Hard of hearing;Bed/chair alarm    WEIGHT BEARING RESTRICTION  Weight Bearing Restriction: None                PAIN ASSESSMENT   Ratin  Location: DENIED  Management Techniques: Activity promotion;Relaxation;Repositioning    BALANCE                                                                                                                       Static Sitting: Good  Dynamic Sitting: Fair           Static Standing: Poor +  Dynamic Standing: Poor +    ACTIVITY TOLERANCE   BP sitting EOB: 99/69   BP standin/132       Pt reports some dizziness. RN notified.              O2 WALK         AM-PAC '6-Clicks' INPATIENT SHORT FORM - BASIC MOBILITY  How much difficulty does the patient currently have...  Patient Difficulty: Turning over in bed (including adjusting bedclothes, sheets and blankets)?: A Little   Patient Difficulty: Sitting down on and standing up from a chair with arms (e.g., wheelchair, bedside commode, etc.): A Little   Patient Difficulty: Moving from lying on back to sitting on the side of the bed?: A Little   How much help from another person does the patient currently need...   Help from Another: Moving to and from a bed to a chair (including a wheelchair)?: A Little   Help from Another: Need to walk in hospital room?: A Little   Help from Another: Climbing 3-5 steps with a railing?: A Little       AM-PAC Score:  Raw Score: 18   Approx Degree of Impairment: 46.58%   Standardized Score (AM-PAC Scale): 43.63   CMS Modifier (G-Code): CK    FUNCTIONAL ABILITY STATUS  Gait Assessment   Functional Mobility/Gait Assessment  Gait Assistance: Contact guard assist  Distance (ft): 100  Assistive Device: Rolling walker  Pattern: Shuffle (kyphotic posture)    Skilled Therapy Provided:     Per RN okay to work  with pt. Pt received in supine and was agreeable to PT session.     Bed Mobility:  Rolling: NT   Supine<>Sit: Mod I    Sit<>Supine: NT     Transfer Mobility:  Sit<>Stand: SBA   Stand<>Sit: SBA   Gait: pt ambulated with RW and CGA    Therapist's Comments: no family present - remains pleasantly confused.  Believed he was in a very nice [employer]      THERAPEUTIC EXERCISES  Lower Extremity Alternating marching  Ankle pumps  LAQ     Upper Extremity Elbow flex/ext and  - open/close     Position Sitting     Repetitions      Sets        Patient End of Session: Up in chair;Needs met;Call light within reach;RN aware of session/findings;All patient questions and concerns addressed;Alarm set    PT Session Time: 10 minutes  Gait Training: 10 minutes  Therapeutic Exercise: 0 minutes

## 2024-04-16 NOTE — PLAN OF CARE
Received pt at shift change. AxOx2. Room air. Denies pain/SOB. Vitals stable. Afib w controlled rates on tele.   See flowsheets for additional assessments.   Pt updated on POC. Call light within reach. All needs met at this time.     Plan:  -Switch to PO lasix this am  -Dc planning    Problem: CARDIOVASCULAR - ADULT  Goal: Maintains optimal cardiac output and hemodynamic stability  Description: INTERVENTIONS:  - Monitor vital signs, rhythm, and trends  - Monitor for bleeding, hypotension and signs of decreased cardiac output  - Evaluate effectiveness of vasoactive medications to optimize hemodynamic stability  - Monitor arterial and/or venous puncture sites for bleeding and/or hematoma  - Assess quality of pulses, skin color and temperature  - Assess for signs of decreased coronary artery perfusion - ex. Angina  - Evaluate fluid balance, assess for edema, trend weights  Outcome: Progressing  Goal: Absence of cardiac arrhythmias or at baseline  Description: INTERVENTIONS:  - Continuous cardiac monitoring, monitor vital signs, obtain 12 lead EKG if indicated  - Evaluate effectiveness of antiarrhythmic and heart rate control medications as ordered  - Initiate emergency measures for life threatening arrhythmias  - Monitor electrolytes and administer replacement therapy as ordered  Outcome: Progressing     Problem: SKIN/TISSUE INTEGRITY - ADULT  Goal: Skin integrity remains intact  Description: INTERVENTIONS  - Assess and document risk factors for pressure ulcer development  - Assess and document skin integrity  - Monitor for areas of redness and/or skin breakdown  - Initiate interventions, skin care algorithm/standards of care as needed  Outcome: Progressing     Problem: Patient/Family Goals  Goal: Patient/Family Long Term Goal  Description: Patient's Long Term Goal: Stay out of hospital    Interventions:  - Test ordered  - Monitor on tele  - Monitor labs  - See additional Care Plan goals for specific  interventions  Outcome: Progressing  Goal: Patient/Family Short Term Goal  Description: Patient's Short Term Goal: Fel better and go home    Interventions:   - Take medications as prescribed  - Participate in the plan of care  - See additional Care Plan goals for specific interventions  Outcome: Progressing     Problem: SAFETY ADULT - FALL  Goal: Free from fall injury  Description: INTERVENTIONS:  - Assess pt frequently for physical needs  - Identify cognitive and physical deficits and behaviors that affect risk of falls.  - [place] fall precautions as indicated by assessment.  - Educate pt/family on patient safety including physical limitations  - Instruct pt to call for assistance with activity based on assessment  - Modify environment to reduce risk of injury  - Provide assistive devices as appropriate  - Consider OT/PT consult to assist with strengthening/mobility  - Encourage toileting schedule  Outcome: Progressing

## 2024-04-16 NOTE — PAYOR COMM NOTE
--------------  ADMISSION REVIEW     Payor: [employer]  Subscriber #:  [id number]  Authorization Number: [id number]    Admit date: 4/12/24  Admit time:  9:30 PM       REVIEW DOCUMENTATION:    Patient Seen in: [facility] Emergency Department      History     Chief Complaint   Patient presents with    Eval-G     Stated Complaint: swelling to testicle x 3 days    Subjective:   89-year-old male, history of A-fib on Eliquis, presents with peripheral edema and scrotal swelling/anasarca.  Family states been ongoing for quite some time but the peripheral edema got worse, started on p.o. diuretics few days ago but not getting any better.  Saw his PCP yesterday, elevated proBNP left-sided pleural effusion.  Also an echo with a normal EF.  Sent here for evaluation.  Not as mobile as he was.  Family states his leg is getting more more swollen this make him more more weak and is more more bedbound because of that.  Limited HPI's ROS secondary to age.  He denies any acute complaints.        Social Determinants of Health      Received from [facility]     Review of Systems   Unable to perform ROS: Age       Positive for stated complaint: swelling to testicle x 3 days    Physical Exam     ED Triage Vitals [04/12/24 1446]   /57   Pulse 71   Resp 20   Temp 96.8 °F (36 °C)   Temp src    SpO2 97 %   O2 Device None (Room air)     Physical Exam     Rate and Rhythm: Normal rate. Rhythm irregular.      Pulses: Normal pulses.   Pulmonary:      Effort: Pulmonary effort is normal.      Comments: Diminished breath sounds in the left  Abdominal:     Genitourinary:     Comments: Some yeast/fungal infection surrounding glans penis.  Musculoskeletal:      Cervical back: Neck supple.      Right lower leg: Edema present.      Left lower leg: Edema present.      Comments: Plus pitting edema of the bilateral lower extremities.  Anasarca starting in his feet up through his scrotum   Skin:     General: Skin is  warm and dry.      Coloration: Skin is pale.         ED Course     Labs Reviewed   COMP METABOLIC PANEL (14) - Abnormal; Notable for the following components:       Result Value    Glucose 61 (*)     Sodium 146 (*)     BUN 27 (*)     Calcium, Total 8.4 (*)     Calculated Osmolality 305 (*)     eGFR-Cr 57 (*)     Total Protein 5.9 (*)     Albumin 2.9 (*)     All other components within normal limits   PRO BETA NATRIURETIC PEPTIDE - Abnormal; Notable for the following components:    Pro-Beta Natriuretic Peptide 1,087 (*)     All other components within normal limits   CBC W/ DIFFERENTIAL - Abnormal; Notable for the following components:    WBC 2.7 (*)     RBC 3.43 (*)     HGB 11.0 (*)     HCT 34.2 (*)     .0 (*)     Lymphocyte Absolute 0.50 (*)    EKG    Rate, intervals and axes as noted on EKG Report.  Rate: 68  Rhythm: Atrial Fibrillation  Reading: EKG atrial fibrillation 62 bpm.  A-fib with slow ventricular response.  Low voltage EKG.  Normal axis.  No ST elevations.  Return September 2022, A-fib replaces sinus rhythm    Patient placed on cardiac monitor for telemetry monitoring secondary to peripheral edema, afib. Interpretation at bedside by me is afib.      [facility]      US SCROTUM W/ DOPPLER (CPT=93975/08523)    Result Date: 4/12/2024  CONCLUSION:  No evidence of testicular torsion orchitis or epididymitis.  Moderate bilateral hydroceles are present.  Nonspecific soft tissue edema of the scrotum.   LOCATION:  [facility]    Dictated by ([facility]): John Irizarry MD on 4/12/2024 at 6:56 PM     Finalized by (CST): oJhn Irizarry MD on 4/12/2024 at 6:59 PM       XR CHEST PA + LAT CHEST (CPT=71046)    Result Date: 4/11/2024  CONCLUSION:  1. Mild-to-moderate left-sided pleural effusion with left basilar atelectasis/infiltrates. 2. Mild interstitial opacities may represent mild edema. 3. Mild cardiac enlargement.    LOCATION:  [facility]   Dictated by (CST): Camelia Figueroa MD on 4/11/2024 at 9:24 AM     Finalized by (CST): Henry  MD Camelia on 2024 at 9:26 AM        Admission disposition: 2024  7:23 PM    I independently interpreted x-ray of the chest and note left-sided pleural effusion    89-year-old male with pleural effusion, anasarca, pitting edema, hypoalbuminemia, A-fib, presents with volume overload.  Will need aggressive diuresis.  Not responding to p.o. diuresis at home.    Discussed with [facility] cardiology who will see in consultation.      Medical Decision Making      Disposition and Plan     Clinical Impression:  1. Pleural effusion    2. Anasarca    3. Thrombocytopenia (HCC)    4. Leukopenia, unspecified type    5. Candidiasis of genitalia    6. Atrial fibrillation, chronic (HCC)    7. Hydrocele, unspecified hydrocele type         Disposition:  Admit  2024  7:23 pm    [facility]   part of [facility]    History and Physical     Micheal Nickerson Patient Status:  Inpatient    1931 MRN [id number]   Location [facility] 8NE-A Attending Avery Khan MD   Hosp Day # 1 PCP Thuan Moody MD     Chief Complaint: Testicular swelling    History of Present Illness: Micheal Nickerson is a 89 year old male with history of atrial fibrillation, prostate cancer, hx c-diff, hx diverticulosis, htn and merkel cell carcinoma s/p radiation presenting with increased swelling of the testicle. History was obtained from the patient and daughter at the bedside. It appears the patient had increased LE swelling for the last week. He saw his PMD and labs, cxr and echo were ordered. He was started on diuretics and had maybe a day of this. He noticed his testicles were more swollen but not painful so he came to the ER for further evaluation and treatment. Per daughter the patient has intermittent increase in confusion as well. Patient otherwise has no other positive review of systems.     Past Medical History:  Past Medical History:    ALLERGIC RHINITIS    Arrhythmia    a-fib    Back problem    CANCER    prostate, GL 8  (4+4) L base, GL 6 R base    Chronic rhinitis    Clostridioides difficile infection    treated, no longer symptomatic    Diverticulosis    Diverticulosis    Exposure to medical diagnostic radiation    2011    Heart attack (HCC)    daughter denies ever having heart attack    Hematuria  PAINLESS    CYCTOSCOPY 1/93   IVP    High blood pressure    History of COVID-19    hospitalized x 4 days. cough/sob. 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 Hernandez    Spine surgery procedure unlisted  1968    Vasectomy         Social History:  reports that he quit smoking about 60 years ago. His smoking use included pipe. He has never used smokeless tobacco. He reports that he does not drink alcohol and does not use drugs.no illegal drugs, , 3 children, use a walker, live with daughter    Medications:    No current facility-administered medications on file prior to encounter.     Current Outpatient Medications on File Prior to Encounter   Medication Sig Dispense Refill    HYDROcodone-acetaminophen 5-325 MG Oral Tab Take 1 tablet by mouth in the morning and 1 tablet before bedtime.      DOXAZOSIN 8 MG Oral Tab TAKE 1 TABLET BY MOUTH DAILY 30 MINUTES AFTER DINNER 90 tablet 1    METOPROLOL SUCCINATE ER 25 MG Oral Tablet 24 Hr TAKE 1 TABLET BY MOUTH DAILY 90 tablet 1     montelukast 10 MG Oral Tab Take 1 tablet (10 mg total) by mouth every evening. 90 tablet 3    ELIQUIS 2.5 MG Oral Tab Take 1 tablet (2.5 mg total) by mouth 2 (two) times a day. 180 tablet 3    amLODIPine 5 MG Oral Tab Take 1 tablet (5 mg total) by mouth daily. 90 tablet 3    lisinopril 40 MG Oral Tab Take 1 tablet (40 mg total) by mouth daily. 90 tablet 3       Review of Systems:   A comprehensive 14 point review of systems was completed.    Pertinent positives and negatives noted in the HPI.    Physical Exam:    /66 (BP Location: Left arm)   Pulse 52   Temp 97.9 °F (36.6 °C) (Oral)   Resp 19   Ht 5' (1.524 m)   Wt 146 lb 4.8 oz (66.4 kg)   SpO2 97%   BMI 28.57 kg/m²   Extremities: 2+ edema of the LE, no tenderness of the LE  I    ASSESSMENT / PLAN:   89 year old male with history of atrial fibrillation, prostate cancer, hx c-diff, hx diverticulosis, htn and merkel cell carcinoma s/p radiation presenting with increased swelling of the testicle.     Testicular swelling  -likely related to fluid overload  -testicular US shows no acute pathology  -monitor    LE edema  -echo shows no significant systolic HF  -pt with increased BNP  -likely HFpEF.   -cardiology consulted  -iv lasix  -strict I/o    Pancytopenia   -if worsens may need to consider inpt evaluation otherwise outpt Heme follow up    Confusion  -pt more alert now  -likely progression of dementia  -no obvious signs of infection  -pt on AC so unlikely cva related, also no other focal signs  -monitor clinically     Bradycardia  -tele  -hold rate controlling meds  -cardiology consulted.     Atrial fibrillation  -rate controlled  -hold metoprolol due to pauses  -on eliquis    HTN  -sbp stable  -metoprolol on hold  -amlodipine  -lisinopril     BPH  -terazosin    Quality:  DVT Prophylaxis: eliquis  CODE status: full code, daughter to discuss dnr with pt and family  Ashley: external     Plan of care discussed with patient and staff    Dispo: no discharge.      Avery Khan MD        4/14 HOSPITALIST    89 year old male with history of atrial fibrillation, prostate cancer, hx c-diff, hx diverticulosis, htn and merkel cell carcinoma s/p radiation presenting with increased swelling of the testicle.      Testicular swelling--> improving  -likely related to fluid overload  -testicular US shows no acute pathology  -monitor     LE edema--> improving  -echo shows no significant systolic HF  -pt with increased BNP  -likely HFpEF.   -cardiology following  -iv lasix  -strict I/o     Pancytopenia   -if worsens may need to consider inpt evaluation otherwise outpt Heme follow up     Confusion--> improved  -pt more alert now  -likely progression of dementia  -no obvious signs of infection  -pt on AC so unlikely cva related, also no other focal signs  -monitor clinically      Bradycardia  Pauses  -tele  -hold rate controlling meds  HTN  -sbp stable  -metoprolol on hold due to bradycardia and pauses  -amlodipine--> held by cardiology   -lisinopril --> held by cardiology       Cardiology     Outpatient cardiologist: Ross  Reason for initial consult: swelling     Subjective:  No issues- sitting in chair     Assessment and Plan:    Anasarca/  Pleural effusion/ HFpEF acute on chronic  PH est PA pressures 45  Neg troponin   Dry wt:  142 10-23 (down 2 lbs)  BNP elevated- goal 500 or less for this pt  Atrial fibrillation, chronic (HCC) slow Vent HR   but had > 4 second pause overnight  HTN  Running low now CAD (calcium seen on CT of chest)   Thrombocytopenia (HCC)/Leukopenia/ anemia with Iron dieficency   IV Iron may help CHF   Candidiasis of genitalia       PLAN:   Cont IVP Lasix 40mg bid  Stop amloidipine and stop  lisinopril to allow better BP to eliminate issues with WRF due to low perfusion  Monitor HR on tele (remove PPM pads/ crash cart). Hold metoprolol. Avoid meds that lower HR   EP to see in am- \"no way cookie' \" to PPM per pt       4/15 Cardiology     Anasarca/  Pleural  effusion/ HFpEF acute on chronic  PH est PA pressures 45     Dry wt:  142 10-23 (down 5 lbs)   Atrial fibrillation, chronic (HCC) slow Vent HR   but had > 4 second pause overnight.  asymptomatic.. family and pt do not want to explore PPM.      HTN. , well-controlled      CAD (calcium seen on CT of chest)   Thrombocytopenia (HCC)/Leukopenia/ anemia with Iron dieficency   IV Iron may help CHF   Candidiasis of genitalia            PLAN:   Cont IVP Lasix 40mg bid today. Transition to po at time of dc  Stop amloidipine.  Lisinopril for HTN/ CHF  Monitor HR on tele (remove PPM pads/ crash cart). Hold metoprolol. Avoid meds that lower HR .  He and family are not interested in PPM     Placement (dementia)      Gibson Alonso MD  4/15/2024      Echo: 4/11/24    1. Study data: Atrial fibrillation with slow ventricular response   2. Left ventricle: The cavity size was normal. Wall thickness was normal.      Systolic function was normal. The estimated ejection fraction was 55-60%,      by visual assessment. No diagnostic evidence for regional wall motion      abnormalities. Unable to assess LV diastolic function due to heart      rhythm.   3. Left atrium: The left atrial volume was markedly increased.   4. Right atrium: The atrium was moderately dilated.   5. Mitral valve: There was mild regurgitation.   6. Tricuspid valve: There was moderate regurgitation.   7. Pulmonary arteries: Systolic pressure was mildly increased, in the range      of 40mm Hg to 45mm Hg. Estimated pulmonary artery diastolic pressure was      24mm Hg.   8. Pericardium, extracardiac: A small pericardial effusion was identified.      There was no evidence of hemodynamic compromise. There was a small right      pleural effusion.   Impressions:  No previous study from [facility] was   available for comparison.         PT    ASSESSMENT   Patient demonstrates good  progress this session, goals  remain in progress.     Patient  continues to function below baseline with bed mobility, transfers, and gait.  Contributing factors to remaining limitations include decreased functional strength, decreased endurance/aerobic capacity, impaired standing balance, and decreased muscular endurance.  Next session anticipate patient to progress bed mobility, transfers, and gait.  Physical Therapy will continue to follow patient for duration of hospitalization.     Patient continues to benefit from continued skilled PT services: at discharge to promote functional independence and safety with additional support and return home with home health PT.     PLAN  PT Treatment Plan: Bed mobility;Body mechanics;Endurance;Patient education;Gait training;Strengthening;Stair training;Transfer training;Balance training  Rehab Potential : Good  Frequency (Obs): 3-5x/week     CURRENT GOALS   Goal #1 Patient is able to demonstrate supine - sit EOB @ level: modified independent      Goal #2 Patient is able to demonstrate transfers EOB to/from BSC at assistance level: modified independent      Goal #3 Patient is able to ambulate 100 feet with assist device: walker - rolling at assistance level: modified independent      Goal #4     Goal #5     Goal #6     Goal Comments: Goals established on 4/13/2024  4/15/2024 all goals ongoing      SUBJECTIVE  \"You're going to be my  the whole time sweetheart?\"     OBJECTIVE  Precautions: Hard of hearing;Bed/chair alarm     WEIGHT BEARING RESTRICTION  Weight Bearing Restriction: None     PAIN ASSESSMENT   Rating: Unable to rate  Location: back  Management Techniques: Activity promotion;Relaxation;Repositioning     BALANCE                                                                                                                       Static Sitting: Good  Dynamic Sitting: Fair                                                                                                           Static Standing: Poor +  Dynamic Standing: Poor  +     ACTIVITY TOLERANCE   BP sitting EOB: 99/69   BP standin/132    Pt reports some dizziness. RN notified.      O2 WALK        AM-PAC '6-Clicks' INPATIENT SHORT FORM - BASIC MOBILITY  How much difficulty does the patient currently have...  Patient Difficulty: Turning over in bed (including adjusting bedclothes, sheets and blankets)?: A Little   Patient Difficulty: Sitting down on and standing up from a chair with arms (e.g., wheelchair, bedside commode, etc.): A Little   Patient Difficulty: Moving from lying on back to sitting on the side of the bed?: A Little   How much help from another person does the patient currently need...   Help from Another: Moving to and from a bed to a chair (including a wheelchair)?: A Little   Help from Another: Need to walk in hospital room?: A Little   Help from Another: Climbing 3-5 steps with a railing?: A Little         AM-PAC Score:  Raw Score: 18   Approx Degree of Impairment: 46.58%   Standardized Score (AM-PAC Scale): 43.63   CMS Modifier (G-Code): CK      4/15 Hospitalist    Acute on chronic diastolic heart failure  Testicular swelling--> improving  -likely related to fluid overload  -testicular US shows no acute pathology  -monitor     LE edema--> improving  -echo shows no significant systolic HF  -pt with increased BNP  -cardiology following, recommendations reviewed-continue, follow creatinine  -iv lasix  -strict I/o    hold metoprolol, family not interested in pacemaker evaluation at this time  - Monitor on telemetry        LABS:        HEM:          Recent Labs   Lab 24  1455 24  0541 04/14/24  0459 04/15/24  0422 04/16/24  0543   WBC 2.7* 1.8* 3.1* 3.0* 3.5*   HGB 11.0* 11.2* 12.0* 12.2* 12.5*   HCT 34.2* 33.9* 36.8* 36.4* 37.5*   .0* 109.0* 122.0* 124.0* 137.0*         Chem:          Recent Labs   Lab 24  1455 24  0541 04/14/24  0459 04/15/24  0422 04/16/24  0543   * 145 145 143 146*   K 3.8 3.6 3.7 3.8  3.8 4.4  4.4   CL  112 112 106 105 105   CO2 29.0 29.0 37.0* 37.0* 36.0*   BUN 27* 25* 23 28* 31*   CREATSERUM 1.19 0.92 1.06 1.06 1.17   CA 8.4* 8.4* 8.1* 8.2* 8.5   MG 2.3  --  1.9 2.1  --    GLU 61* 71 97 101* 92               Recent Labs   Lab 04/11/24  0728 04/12/24  1455 04/14/24  0459   ALT 34 33 30   AST 17 21 15   ALB 3.1* 2.9* 2.9*      MEDICATIONS ADMINISTERED IN LAST 1 DAY:  acetaminophen (Tylenol) tab 650 mg       Date Action Dose Route User    4/15/2024 1145 Given 650 mg Oral Keara Montiel RN          apixaban (Eliquis) tab 2.5 mg       Date Action Dose Route User    4/16/2024 0916 Given 2.5 mg Oral Nilda Diaz RN    4/15/2024 2021 Given 2.5 mg Oral Demetrio Kong RN          furosemide (Lasix) 10 mg/mL injection 40 mg       Date Action Dose Route User    4/15/2024 1615 Given 40 mg Intravenous Keara Montiel RN          furosemide (Lasix) tab 40 mg       Date Action Dose Route User    4/16/2024 0916 Given 40 mg Oral Nilda Diaz RN           montelukast (Singulair) tab 10 mg       Date Action Dose Route User    4/15/2024 2021 Given 10 mg Oral Demetrio Kong RN          terazosin (Hytrin) cap 10 mg       Date Action Dose Route User    4/15/2024 2021 Given 10 mg Oral Demetrio Kong RN            Vitals (last day)       Date/Time Temp Pulse Resp BP SpO2 Weight O2 Device O2 Flow Rate (L/min) Who    04/16/24 0455 97.5 °F (36.4 °C) 77 17 135/76 100 % 133 lb 2.5 oz None (Room air) -- AT    04/15/24 2350 97.5 °F (36.4 °C) 80 19 111/70 93 % -- None (Room air) -- AT    04/15/24 2042 -- 59 -- 89/60 -- -- -- -- MD    04/15/24 2040 97.5 °F (36.4 °C) 69 17 89/60 96 % -- None (Room air) -- AT    04/15/24 1605 97.7 °F (36.5 °C) 67 18 95/79 97 % -- None (Room air) -- DI    04/15/24 1510 -- 51 -- 99/69 -- -- -- -- KR    04/15/24 1215 98 °F (36.7 °C) 74 18 94/67 93 % -- None (Room air) -- KR    04/15/24 0736 98 °F (36.7 °C) 75 20 131/78 91 % -- None (Room air) -- DI    04/15/24 0441 98.4 °F (36.9 °C) 87 20 107/68 --  -- None (Room air) -- BN     04/13/24 1156 98 °F (36.7 °C) 59 19 104/65 98 % -- None (Room air) -- NN   04/13/24 0812 97.5 °F (36.4 °C) 58 19 106/65 95 % -- None (Room air) -- NN   04/13/24 0537 97.9 °F (36.6 °C) 52 19 112/66 97 % 146 lb 4.8 oz None (Room air) -- VO   04/13/24 0500 -- -- -- -- -- -- None (Room air) -- VO   04/13/24 0228 -- 47 Abnormal  -- 110/67 94 % -- -- -- JR   04/12/24 2246 -- 72 -- 111/78 93 % 148 lb 13 oz -- -- BK   04/12/24 2244 -- 66 -- 108/76 95 % -- -- -- BK   04/12/24 2242 -- 57 -- 120/81 96 % -- -- -- BK   04/12/24 2242 -- -- -- -- -- -- None (Room air) -- JR   04/12/24 2100 -- 50 15 108/68 97 % -- None (Room air) -- BG   04/12/24 2030 -- 64 14 101/80 95 % -- None (Room air) -- BG   04/12/24 2005 -- 59 -- 110/74 95 % -- None (Room air) -- BG   04/12/24 1836 -- -- -- 114/65 -- -- -- -- BGA   04/12/24 1830 -- 45 Abnormal  16 115/61 98 % -- -- -- EF   04/12/24 1446 96.8 °F (36 °C) 71 20 100/57 97 % 151 lb 14.4 oz None (Room air) -- TV

## 2024-04-16 NOTE — OCCUPATIONAL THERAPY NOTE
OCCUPATIONAL THERAPY TREATMENT NOTE - INPATIENT     Room Number: 8622/8622-A  Session: 1   Number of Visits to Meet Established Goals: 3    Presenting Problem: pleural effusion, hydrocele, anasarca, thrombocytopenia      ASSESSMENT   Patient demonstrates fair progress this session, goals progressing with 2/5 met this session.    Patient continues to function near baseline with toileting, lower body dressing, bed mobility, transfers, static standing balance, and dynamic standing balance.   Contributing factors to remaining limitations include decreased functional strength, decreased functional reach, decreased endurance, impaired standing balance, and cognitive deficits (dx dementia; limited safety awareness, poor STM, decreased ability to sequence, perseveration with cues to redirect).  Next session anticipate patient to progress toileting, lower body dressing, and dynamic standing balance.  Occupational Therapy will continue to follow patient for duration of hospitalization.    Patient continues to benefit from continued skilled OT services: at discharge to promote functional independence and safety with additional support and return home with home health OT.          OT Device Recommendations: TBD    History: Patient is a 89 year old male admitted on 4/12/2024 with Presenting Problem: pleural effusion, hydrocele, anasarca, thrombocytopenia. Co-Morbidities : advanced dementia, metastatic Merkel cell CA, HTN    WEIGHT BEARING RESTRICTION  Weight Bearing Restriction: None                Recommendations for nursing staff:   Transfers: 1p min A c. FWW for safety  Toileting location: toilet    TREATMENT SESSION:  Patient Start of Session: Semi-supine in bed  FUNCTIONAL TRANSFER ASSESSMENT  Sit to Stand: Edge of Bed  Edge of Bed: Contact Guard Assist  Toilet Transfer: Not Tested    BED MOBILITY  Rolling: Modified Independent  Supine to Sit : Modified Independent  Scooting: mod I to scoot EOB    BALANCE ASSESSMENT  Static  Sitting: Modified Independent  Sitting Bilateral: Modified Independent  Static Standing: Supervision  Standing Bilateral: Contact Guard Assist    FUNCTIONAL ADL ASSESSMENT  LB Dressing Seated: Maximum Assist (2/2 cognitive deficit dx dementia. don socks for pt, able to flex hip and knee to lift foot to push into sock)  LB Dressing Standing: Maximum Assist (2/2 cognitive deficits. cues to initiate after brief pulled up over knees, pt demo'd confusion and pulled at gown. completed LBD for pt)  Toileting Seated: Not Tested  Toileting Standing: Not Tested      ACTIVITY TOLERANCE: Vitals stable                         O2 SATURATIONS       EDUCATION PROVIDED  Patient: Role of Occupational Therapy; Plan of Care; Adaptive Equipment Recommendations; Functional Transfer Techniques; Posture/Positioning; Compensatory ADL Techniques; Proper Body Mechanics  Patient's Response to Education: Requires Further Education; Does Not Demonstrate Skills Needed for Learning  Family/Caregiver: Other (Not present)  Family/Caregiver's Response to Education: Requires Further Education      Equipment used: FWW  Demonstrates functional use, Would benefit from additional trial      Exercises:    Exercises Repetitions Comments   Scapular elevation     Scapular retraction     Shoulder rolls     Shoulder flexion     Shoulder abduction     Shoulder internal/external rotation     Forward punch     Elbow flexion     Elbow extension     Forearm pronation/supination     Wrist flexion/extension     Gross grasp/fist pumps     Ankle pumps     Knee extension     Marching       UPPER EXTREMITY  ROM: within functional limits   Strength: is within functional limits   Coordination  Gross motor: WFL    Therapist comments: Pt pleasant and agreeable to therapy. Pt perseverating on contacting family to \"get him out of here\" as well as needing to use the bathroom. Pt informed that he has a brief on and can void freely PRN. Pt needing verbal cues to initiate tasks and  redirect. Educated pt on use of AE for LBD, requires further education 2/2 cognitive deficits. Bed mobility mod I/sup with HOB elevated and use of bed rails. Attempted to practice LBD with brief management in standing and sock donning, pt unable to complete 2/2 cognitive deficits. Pt EOB>chair c. FWW CGA. Attempted to contact pt family member, unable to contact.   Patient End of Session: Up in chair;Needs met;Call light within reach;RN aware of session/findings;All patient questions and concerns addressed;Alarm set    SUBJECTIVE  \"Why did they leave me like this?\"    PAIN ASSESSMENT  Ratin  Location: None reported or observed        OBJECTIVE  Precautions: Hard of hearing;Bed/chair alarm    AM-PAC ‘6-Clicks’ Inpatient Daily Activity Short Form  -   Putting on and taking off regular lower body clothing?: A Lot  -   Bathing (including washing, rinsing, drying)?: A Lot  -   Toileting, which includes using toilet, bedpan or urinal? : A Lot  -   Putting on and taking off regular upper body clothing?: A Little  -   Taking care of personal grooming such as brushing teeth?: A Little  -   Eating meals?: A Little    AM-PAC Score:  Score: 15  Approx Degree of Impairment: 56.46%  Standardized Score (AM-PAC Scale): 34.69    PLAN  OT Treatment Plan: Balance activities;Energy conservation/work simplification techniques;ADL training;IADL training;Functional transfer training;UE strengthening/ROM;Endurance training;Cognitive reorientation;Patient/Family education;Patient/Family training;Equipment eval/education;Compensatory technique education;Continued evaluation  Rehab Potential : Fair  Frequency: 3x/week    OT Goals: Met 2/5  ADL Goals  Patient will perform toileting with min A and AE PRN  Patient will perform LB dressing with min A and AE PRN     Functional Transfer Goals  Patient will perform bed mobility supine to sit with supervision. Goal met  Patient will perform bed mobility sit to supine with supervision. Goal  met  Patient will perform toilet transfer with min A    OT Session Time: 20 minutes  Self-Care Home Management: 5 minutes  Therapeutic Activity: 15 minutes  Neuromuscular Re-education: 0 minutes  Therapeutic Exercise: 0 minutes  Cognitive Skills: 0 minutes  Sensory Integrative: 0 minutes  Orthotic Management and Trainin minutes  Can add/delete any of these

## 2024-04-16 NOTE — PROGRESS NOTES
[facility] Cardiology Progress Note        Micheal Nickerson Patient Status:  Inpatient    1931 MRN [id number]   Location [facility] 8NE-A Attending Helder Machado,    Hosp Day # 4 PCP Thuan Moody MD     Subjective:  The patient denies  chest pain and shortness of breath.    Medications:   [Held by provider] lisinopril  20 mg Oral Daily    furosemide  40 mg Intravenous BID (Diuretic)    terazosin  10 mg Oral Nightly    apixaban  2.5 mg Oral BID    montelukast  10 mg Oral Nightly       Continuous Infusions:        Allergies:  Allergies   Allergen Reactions    Contrast Dye [Gadolinium Derivatives]          Objective:        Intake/Output:      Intake/Output Summary (Last 24 hours) at 2024 0738  Last data filed at 2024 0455  Gross per 24 hour   Intake 560 ml   Output 2550 ml   Net -1990 ml     Wt Readings from Last 3 Encounters:   24 133 lb 2.5 oz (60.4 kg)   23 152 lb (68.9 kg)   23 152 lb (68.9 kg)       Physical Exam:        Vitals:    04/15/24 2040 04/15/24 2042 04/15/24 2350 24 0455   BP: (!) 89/60 (!) 89/60 111/70 135/76   BP Location: Left arm  Left arm Left arm   Pulse: 69 59 80 77   Resp: 17  19 17   Temp: 97.5 °F (36.4 °C)  97.5 °F (36.4 °C) 97.5 °F (36.4 °C)   TempSrc: Oral  Oral Oral   SpO2: 96%  93% 100%   Weight:    133 lb 2.5 oz (60.4 kg)   Height:           Temp:  [97.5 °F (36.4 °C)-98 °F (36.7 °C)] 97.5 °F (36.4 °C)  Pulse:  [51-80] 77  Resp:  [17-19] 17  BP: ()/(60-79) 135/76  SpO2:  [93 %-100 %] 100 %      Temp: 97.5 °F (36.4 °C)  Pulse: 77  Resp: 17  BP: 135/76  General:  Appears comfortable  HEENT: No focal deficits.  Neck: No JVD, carotids 2+ no bruits.  Cardiac: Irregular S1S2.  No S3, S4, rub, click.  No murmur.  Lungs: Clear to auscultation and percussion.  Abdomen: Soft, non-tender.   Extremities: 1-2+ bilateral LE edema.  No clubbing or cyanosis.    Neurologic: Alert and oriented, normal affect.  Skin: Warm  and dry.           LABS:      HEM:  Recent Labs   Lab 04/12/24  1455 04/13/24  0541 04/14/24  0459 04/15/24  0422 04/16/24  0543   WBC 2.7* 1.8* 3.1* 3.0* 3.5*   HGB 11.0* 11.2* 12.0* 12.2* 12.5*   HCT 34.2* 33.9* 36.8* 36.4* 37.5*   .0* 109.0* 122.0* 124.0* 137.0*       Chem:  Recent Labs   Lab 04/12/24  1455 04/13/24  0541 04/14/24  0459 04/15/24  0422 04/16/24  0543   * 145 145 143 146*   K 3.8 3.6 3.7 3.8  3.8 4.4  4.4    112 106 105 105   CO2 29.0 29.0 37.0* 37.0* 36.0*   BUN 27* 25* 23 28* 31*   CREATSERUM 1.19 0.92 1.06 1.06 1.17   CA 8.4* 8.4* 8.1* 8.2* 8.5   MG 2.3  --  1.9 2.1  --    GLU 61* 71 97 101* 92       Recent Labs   Lab 04/11/24  0728 04/12/24  1455 04/14/24  0459   ALT 34 33 30   AST 17 21 15   ALB 3.1* 2.9* 2.9*       No results for input(s): \"PT\", \"PTT\", \"INR\" in the last 168 hours.        Lab Results   Component Value Date    TROP <0.046 02/01/2016         Invalid input(s): \"PBNPML\"                       Diagnostics:   Telemetry: Atrial Fibrillation, 80's    EKG, 4/15/2024,         Echo: 4/11/24    1. Study data: Atrial fibrillation with slow ventricular response   2. Left ventricle: The cavity size was normal. Wall thickness was normal.      Systolic function was normal. The estimated ejection fraction was 55-60%,      by visual assessment. No diagnostic evidence for regional wall motion      abnormalities. Unable to assess LV diastolic function due to heart      rhythm.   3. Left atrium: The left atrial volume was markedly increased.   4. Right atrium: The atrium was moderately dilated.   5. Mitral valve: There was mild regurgitation.   6. Tricuspid valve: There was moderate regurgitation.   7. Pulmonary arteries: Systolic pressure was mildly increased, in the range      of 40mm Hg to 45mm Hg. Estimated pulmonary artery diastolic pressure was      24mm Hg.   8. Pericardium, extracardiac: A small pericardial effusion was identified.      There was no evidence of  hemodynamic compromise. There was a small right      pleural effusion.   Impressions:  No previous study from [facility] was   available for comparison.     Cardiac Cath:              Impression:      Anasarca/  Pleural effusion/ HFpEF acute on chronic  PH est PA pressures 45    Dry wt:  142 10-23 (down 9 lbs)   Atrial fibrillation, chronic (HCC) slow Vent HR   but had > 4 second pause overnight.  asymptomatic.. family and pt do not want to explore PPM.     HTN. , well-controlled     CAD (calcium seen on CT of chest)   Thrombocytopenia (HCC)/Leukopenia/ anemia with Iron dieficency   IV Iron may help CHF   Candidiasis of genitalia            PLAN:     Transition lasix to po  Stopped amloidipine.  Lisinopril for HTN/ CHF on hold given soft bp's  Monitor HR on tele (remove PPM pads/ crash cart). Hold metoprolol. Avoid meds that lower HR .  He and family are not interested in PPM    Placement (dementia)           Gibson Alonso MD

## 2024-04-16 NOTE — PLAN OF CARE
Problem: CARDIOVASCULAR - ADULT  Goal: Maintains optimal cardiac output and hemodynamic stability  Description: INTERVENTIONS:  - Monitor vital signs, rhythm, and trends  - Monitor for bleeding, hypotension and signs of decreased cardiac output  - Evaluate effectiveness of vasoactive medications to optimize hemodynamic stability  - Monitor arterial and/or venous puncture sites for bleeding and/or hematoma  - Assess quality of pulses, skin color and temperature  - Assess for signs of decreased coronary artery perfusion - ex. Angina  - Evaluate fluid balance, assess for edema, trend weights  Outcome: Progressing  Goal: Absence of cardiac arrhythmias or at baseline  Description: INTERVENTIONS:  - Continuous cardiac monitoring, monitor vital signs, obtain 12 lead EKG if indicated  - Evaluate effectiveness of antiarrhythmic and heart rate control medications as ordered  - Initiate emergency measures for life threatening arrhythmias  - Monitor electrolytes and administer replacement therapy as ordered  Outcome: Progressing     Problem: SKIN/TISSUE INTEGRITY - ADULT  Goal: Skin integrity remains intact  Description: INTERVENTIONS  - Assess and document risk factors for pressure ulcer development  - Assess and document skin integrity  - Monitor for areas of redness and/or skin breakdown  - Initiate interventions, skin care algorithm/standards of care as needed  Outcome: Progressing     Problem: Patient/Family Goals  Goal: Patient/Family Long Term Goal  Description: Patient's Long Term Goal: Stay out of hospital    Interventions:  - Test ordered  - Monitor on tele  - Monitor labs  - See additional Care Plan goals for specific interventions  Outcome: Progressing  Goal: Patient/Family Short Term Goal  Description: Patient's Short Term Goal: Fel better and go home    Interventions:   - Take medications as prescribed  - Participate in the plan of care  - See additional Care Plan goals for specific interventions  Outcome:  Progressing     Problem: SAFETY ADULT - FALL  Goal: Free from fall injury  Description: INTERVENTIONS:  - Assess pt frequently for physical needs  - Identify cognitive and physical deficits and behaviors that affect risk of falls.  - [place] fall precautions as indicated by assessment.  - Educate pt/family on patient safety including physical limitations  - Instruct pt to call for assistance with activity based on assessment  - Modify environment to reduce risk of injury  - Provide assistive devices as appropriate  - Consider OT/PT consult to assist with strengthening/mobility  - Encourage toileting schedule  Outcome: Progressing

## 2024-04-17 ENCOUNTER — HOSPITAL ENCOUNTER (INPATIENT)
Facility: HOSPITAL | Age: 89
LOS: 4 days | Discharge: HOSPICE | End: 2024-04-21
Attending: EMERGENCY MEDICINE | Admitting: INTERNAL MEDICINE
Payer: MEDICARE

## 2024-04-17 ENCOUNTER — APPOINTMENT (OUTPATIENT)
Dept: CT IMAGING | Facility: HOSPITAL | Age: 89
End: 2024-04-17
Attending: STUDENT IN AN ORGANIZED HEALTH CARE EDUCATION/TRAINING PROGRAM
Payer: MEDICARE

## 2024-04-17 ENCOUNTER — APPOINTMENT (OUTPATIENT)
Dept: GENERAL RADIOLOGY | Facility: HOSPITAL | Age: 89
End: 2024-04-17
Attending: EMERGENCY MEDICINE
Payer: MEDICARE

## 2024-04-17 DIAGNOSIS — R00.1 BRADYCARDIA: ICD-10-CM

## 2024-04-17 DIAGNOSIS — R55 SYNCOPE AND COLLAPSE: Primary | ICD-10-CM

## 2024-04-17 LAB
ALBUMIN SERPL-MCNC: 2.9 G/DL (ref 3.4–5)
ALBUMIN/GLOB SERPL: 0.9 {RATIO} (ref 1–2)
ALP LIVER SERPL-CCNC: 98 U/L
ALT SERPL-CCNC: 24 U/L
ANION GAP SERPL CALC-SCNC: 6 MMOL/L (ref 0–18)
AST SERPL-CCNC: 18 U/L (ref 15–37)
BASOPHILS # BLD AUTO: 0.02 X10(3) UL (ref 0–0.2)
BASOPHILS NFR BLD AUTO: 0.5 %
BILIRUB SERPL-MCNC: 0.7 MG/DL (ref 0.1–2)
BUN BLD-MCNC: 33 MG/DL (ref 9–23)
CALCIUM BLD-MCNC: 8.7 MG/DL (ref 8.5–10.1)
CHLORIDE SERPL-SCNC: 103 MMOL/L (ref 98–112)
CO2 SERPL-SCNC: 34 MMOL/L (ref 21–32)
CREAT BLD-MCNC: 1.12 MG/DL
EGFRCR SERPLBLD CKD-EPI 2021: 62 ML/MIN/1.73M2 (ref 60–?)
EOSINOPHIL # BLD AUTO: 0.08 X10(3) UL (ref 0–0.7)
EOSINOPHIL NFR BLD AUTO: 2.2 %
ERYTHROCYTE [DISTWIDTH] IN BLOOD BY AUTOMATED COUNT: 15.3 %
GLOBULIN PLAS-MCNC: 3.4 G/DL (ref 2.8–4.4)
GLUCOSE BLD-MCNC: 101 MG/DL (ref 70–99)
GLUCOSE BLD-MCNC: 93 MG/DL (ref 70–99)
HCT VFR BLD AUTO: 40 %
HGB BLD-MCNC: 12.4 G/DL
IMM GRANULOCYTES # BLD AUTO: 0.01 X10(3) UL (ref 0–1)
IMM GRANULOCYTES NFR BLD: 0.3 %
LYMPHOCYTES # BLD AUTO: 0.87 X10(3) UL (ref 1–4)
LYMPHOCYTES NFR BLD AUTO: 23.9 %
MAGNESIUM SERPL-MCNC: 2.1 MG/DL (ref 1.6–2.6)
MCH RBC QN AUTO: 31.3 PG (ref 26–34)
MCHC RBC AUTO-ENTMCNC: 31 G/DL (ref 31–37)
MCV RBC AUTO: 101 FL
MONOCYTES # BLD AUTO: 0.6 X10(3) UL (ref 0.1–1)
MONOCYTES NFR BLD AUTO: 16.5 %
NEUTROPHILS # BLD AUTO: 2.06 X10 (3) UL (ref 1.5–7.7)
NEUTROPHILS # BLD AUTO: 2.06 X10(3) UL (ref 1.5–7.7)
NEUTROPHILS NFR BLD AUTO: 56.6 %
OSMOLALITY SERPL CALC.SUM OF ELEC: 303 MOSM/KG (ref 275–295)
PLATELET # BLD AUTO: 142 10(3)UL (ref 150–450)
POTASSIUM SERPL-SCNC: 4.1 MMOL/L (ref 3.5–5.1)
PROT SERPL-MCNC: 6.3 G/DL (ref 6.4–8.2)
Q-T INTERVAL: 390 MS
QRS DURATION: 100 MS
QTC CALCULATION (BEZET): 438 MS
R AXIS: 8 DEGREES
RBC # BLD AUTO: 3.96 X10(6)UL
SODIUM SERPL-SCNC: 143 MMOL/L (ref 136–145)
T AXIS: 11 DEGREES
TROPONIN I SERPL HS-MCNC: 19 NG/L
VENTRICULAR RATE: 76 BPM
WBC # BLD AUTO: 3.6 X10(3) UL (ref 4–11)

## 2024-04-17 PROCEDURE — 71045 X-RAY EXAM CHEST 1 VIEW: CPT | Performed by: EMERGENCY MEDICINE

## 2024-04-17 PROCEDURE — 70450 CT HEAD/BRAIN W/O DYE: CPT | Performed by: STUDENT IN AN ORGANIZED HEALTH CARE EDUCATION/TRAINING PROGRAM

## 2024-04-17 RX ORDER — SENNOSIDES 8.6 MG
17.2 TABLET ORAL NIGHTLY PRN
Status: DISCONTINUED | OUTPATIENT
Start: 2024-04-17 | End: 2024-04-21

## 2024-04-17 RX ORDER — ONDANSETRON 2 MG/ML
4 INJECTION INTRAMUSCULAR; INTRAVENOUS EVERY 6 HOURS PRN
Status: DISCONTINUED | OUTPATIENT
Start: 2024-04-17 | End: 2024-04-21

## 2024-04-17 RX ORDER — FUROSEMIDE 40 MG/1
40 TABLET ORAL DAILY
Status: DISCONTINUED | OUTPATIENT
Start: 2024-04-18 | End: 2024-04-19

## 2024-04-17 RX ORDER — BISACODYL 10 MG
10 SUPPOSITORY, RECTAL RECTAL
Status: DISCONTINUED | OUTPATIENT
Start: 2024-04-17 | End: 2024-04-21

## 2024-04-17 RX ORDER — ACETAMINOPHEN 500 MG
500 TABLET ORAL EVERY 4 HOURS PRN
Status: DISCONTINUED | OUTPATIENT
Start: 2024-04-17 | End: 2024-04-21

## 2024-04-17 RX ORDER — POLYETHYLENE GLYCOL 3350 17 G/17G
17 POWDER, FOR SOLUTION ORAL DAILY PRN
Status: DISCONTINUED | OUTPATIENT
Start: 2024-04-17 | End: 2024-04-21

## 2024-04-17 RX ORDER — METOCLOPRAMIDE HYDROCHLORIDE 5 MG/ML
5 INJECTION INTRAMUSCULAR; INTRAVENOUS EVERY 8 HOURS PRN
Status: DISCONTINUED | OUTPATIENT
Start: 2024-04-17 | End: 2024-04-21

## 2024-04-17 RX ORDER — MONTELUKAST SODIUM 10 MG/1
10 TABLET ORAL EVERY EVENING
Status: DISCONTINUED | OUTPATIENT
Start: 2024-04-17 | End: 2024-04-21

## 2024-04-17 RX ORDER — ENEMA 19; 7 G/133ML; G/133ML
1 ENEMA RECTAL ONCE AS NEEDED
Status: DISCONTINUED | OUTPATIENT
Start: 2024-04-17 | End: 2024-04-21

## 2024-04-17 RX ORDER — TERAZOSIN 5 MG/1
10 CAPSULE ORAL NIGHTLY
Status: DISCONTINUED | OUTPATIENT
Start: 2024-04-17 | End: 2024-04-21

## 2024-04-17 NOTE — ED QUICK NOTES
Family at bedside, daughter and grandson.   Pt resting comfortably, pt has no needs at this time.

## 2024-04-17 NOTE — ED INITIAL ASSESSMENT (HPI)
Pt arrives to ED via EMS s/p witnessed syncopal at home with his family. Pt does not recall the syncopal episode and states he was feeling normal prior to. Pt glucose en route was 84. Pt arrives to ED A&Ox3. Pt was released for [facility] yesterday.

## 2024-04-17 NOTE — H&P
[facility]   Hospitalist Team  [facility]   part of [facility]     History and Physical    Micheal Nickerson Patient Status:  Inpatient    1931 MRN [id number]   Location [facility] 2NE-A Attending Peter Mark MD   Hosp Day # 0 PCP Thuan Moody MD     Admit Date:  2024    Is this a shared or split note between Advanced Practice Provider and Physician? Yes    ASSESSMENT / PLAN:   89 year old male from home with a PMHx sig for afib, CAD,  HFpEF, prostate CA,  h/o cdiff, diverticulosis and HTN presents to the hospital with syncope and collapse.    Syncope and collapse  -passed out while in kitchen with family, no injury reported as he was eased to the ground  -ECG w controlled rate AFIB   -troponin normal  -cardiology consulted, plan for EP consult, likely won't see the patient until Friday, no urgent need for PPM at this time, monitor on tele for pauses  -ECHO from 24, EF 55-60    Bradycardia  Pauses  -monitor on tele for pauses  -family was not interested in PPM last hospitalization, given new syncopal event they are now willing to consider  -EP to evaluate    Afib  -rate controlled  -eliquis,on hold starting tomorrow per cardiology, will give one dose tonight    HFpEF  -no signs of vol overload upon physical exam  -lasix    BPH  -terazosin    Voluntary GOC discussion with patient, POA/daughter Kathie confirms they have not made this decision yet.  They wanted him to decide on the last hospitalization but he was too confused.  They would like to discuss this as a family and are ok with a palliative care consult to help guide them with GOC.    MA/ACO Reach  -Re- Entry: yes  -Consults: cards, EP  -Discharge Needs: TBD  -Appointments: PCP      FERMIN crowder in am  -diet-cardiac    Prophy  -SCD    Dispo  -pending clinical course  PCP: Thuan Moody MD       Outpatient records or previous hospital records reviewed.   Further recommendations pending patient's  clinical course.  [facility] hospitalist  team to continue to follow patient while in house  Concerns regarding plan of care were discussed with patient. Patient agrees with plan as detailed above. Discussed plan of care with Dr. Machado    Note: This chart was prepared using voice recognition software and may contain unintended word substitution errors.     Ron BEE  [facility] Hospitalist Team   Available via Perfect Serve or Bubble Chat (check Availability)    4/17/2024               HISTORY:   CC:   Chief Complaint   Patient presents with    Syncope        PCP: Thuan Moody MD    History of Present Illness: 89 year old male with a PMHx sig for afib, CAD, HFpEF, prostate CA,  h/o cdiff, diverticulosis and HTN presents to the hospital with syncope and collapse.  He was just discharged from the hospital and was experiencing bradycardia and <4 sec pauses on telemetry during that hospitalization.  The family was not interested in PPM insertion at this time.  He has a syncopal event at home while with family and was slowly lowered to the ground with no injury.  He woke up after a few seconds and returned to baseline.  He was brought back to [facility] for evaluation.  Cardiology was consulted and rec an EP consult who won't be able to see the patient until Friday.  Monitor on telemetry until EP can see.       Review of Systems  12 point systems reviewed, please see HPI for pertinent positives, otherwise negative    OBJECTIVE:  /79 (BP Location: Right arm)   Pulse 71   Temp (!) 96.3 °F (35.7 °C) (Axillary)   Resp 12   Ht 5' 2\" (1.575 m)   Wt 136 lb 1.6 oz (61.7 kg)   SpO2 96%   BMI 24.89 kg/m²     GENERAL: no apparent distress  NEUROLOGIC: A/A; Ox2-3: strength normal; sensations intact  RESPIRATORY: normal expansion; non labored, CTA   CARDIOVASCULAR: afib  ABDOMEN:  Soft, BS+; non distended, non tender    EXTREMITIES: no LE edema    PMH  Past Medical History:    ALLERGIC RHINITIS     Arrhythmia    a-fib    Back problem    CANCER    prostate, GL 8 (4+4) L base, GL 6 R base    Chronic rhinitis    Clostridioides difficile infection    treated, no longer symptomatic    Diverticulosis    Diverticulosis    Exposure to medical diagnostic radiation        Heart attack (HCC)    daughter denies ever having heart attack    Hematuria  PAINLESS    CYCTOSCOPY    IVP    High blood pressure    History of COVID-19    hospitalized x 4 days. cough/sob. 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 Hernandez    Spine surgery procedure unlisted      Vasectomy          ALL:  Allergies   Allergen Reactions    Contrast Dye [Gadolinium Derivatives]         Home Medications:  Outpatient Medications Marked as Taking for the 24 encounter (Hospital Encounter)   Medication Sig Dispense Refill    DOXAZOSIN 8 MG Oral Tab TAKE 1 TABLET BY MOUTH DAILY 30 MINUTES AFTER DINNER 90 tablet 1    montelukast 10 MG Oral Tab Take 1 tablet (10 mg total) by mouth every evening. 90 tablet 3    ELIQUIS 2.5 MG Oral Tab Take 1 tablet (2.5 mg total) by mouth 2 (two) times a day. 180 tablet 3       Soc Hx  Social History     Tobacco Use    Smoking status: Former     Types: Pipe     Quit date:      Years since quittin.3    Smokeless tobacco: Never   Substance Use Topics    Alcohol use: No         Fam Hx  Family History   Problem Relation Age of Onset    Other ([other]) Mother     Cancer Father         esophageal    Heart Attack Brother                   DIAGNOSTIC DATA:   CBC/Chem  Recent Labs   Lab 04/13/24  0541 04/14/24  0459 04/15/24  0422 04/16/24  0543 04/17/24  0816   WBC 1.8* 3.1* 3.0* 3.5* 3.6*   HGB 11.2* 12.0* 12.2* 12.5* 12.4*   MCV 96.6 97.1 95.8 97.4 101.0*   .0* 122.0* 124.0* 137.0* 142.0*       Recent Labs   Lab 04/12/24  1455 04/13/24  0541 04/14/24  0459 04/15/24  0422 04/16/24  0543 04/17/24  0816   * 145 145 143 146* 143   K 3.8 3.6 3.7 3.8  3.8 4.4  4.4 4.1    112 106 105 105 103   CO2 29.0 29.0 37.0* 37.0* 36.0* 34.0*   BUN 27* 25* 23 28* 31* 33*   CREATSERUM 1.19 0.92 1.06 1.06 1.17 1.12   GLU 61* 71 97 101* 92 93   CA 8.4* 8.4* 8.1* 8.2* 8.5 8.7   MG 2.3  --  1.9 2.1  --  2.1       Recent Labs   Lab 04/11/24  0728 04/12/24  1455 04/14/24  0459 04/17/24  0816   ALT 34 33 30 24   AST 17 21 15 18   ALB 3.1* 2.9* 2.9* 2.9*       No results for input(s): \"TROP\" in the last 168 hours.      CXR: image personally reviewed     Radiology: XR CHEST AP PORTABLE  (CPT=71045)    Result Date: 4/17/2024  CONCLUSION:  1. Cardiomegaly with mild interstitial opacities likely representing mild edema. 2. Small left-sided pleural effusion with left basilar atelectasis/infiltrates.    LOCATION:  [facility]      Dictated by (CST): Camelia Figueroa MD on 4/17/2024 at 8:55 AM     Finalized by (CST): Camelia Figueroa MD on 4/17/2024 at 8:55 AM          SEE ATTENDING NOTE BELOW      Patient seen and examined independently.  Discussed with APN and agree with note above.      S: Seen and examined at bedside.  Patient laying in bed comfortably.  Answered all questions.  Family at bedside.    objective  BP 97/61 (BP Location: Right arm)   Pulse 65   Temp 96.8 °F (36 °C) (Axillary)   Resp 15   Ht 5' 2\" (1.575 m)   Wt 136 lb 1.6 oz (61.7 kg)   SpO2 98%   BMI 24.89 kg/m²     Gen: No acute  distress, alert and oriented x to-3  Neck Supple, no JVD  Pulm: Lungs clear, normal respiratory effort, No wheezing or crackles  CV: Heart with regular rate and rhythm, No murmurs, rubs, gallops  Abd: Abdomen soft, nontender, nondistended, no organomegaly, bowel sounds present  MSK:  no clubbing, no cyanosis.  No Lower extremity edema  Skin: no rashes or lesions, well perfused  Psych: mood stable, cooperative  Neuro: no focal deficits, alert oriented x 2-3 (at baseline), 5 out of 5 strength upper and lower extremities, able to answer questions appropriately and follow commands      Assessment and Plan    89 year old male from home with a PMHx sig for afib, CAD,  HFpEF, prostate CA,  h/o cdiff, diverticulosis and HTN presents to the hospital with syncope and collapse.    Syncope and collapse  Rule out spontaneous hemorrhagic bleed  Bradycardia  Pauses  Afib  HFpEF  BPH      - Telemetry  - Cardiology evaluation  - Etiology of syncope is unclear, patient has been off of elda blocking agents, heart rate will have to be extremely low with pauses in order to have a profound syncopal episode  - Will obtain CT head in the setting of Eliquis use  - Will hold Eliquis for now  - Discussed with family.  Answered all questions.      Rest as above with above    Helder Machado DO  Hospitalist  [facility]

## 2024-04-17 NOTE — ED QUICK NOTES
Orders for admission, patient is aware of plan and ready to go upstairs. Any questions, please call ED RN Harmony at extension [phone].     Patient Covid vaccination status: Fully vaccinated     COVID Test Ordered in ED: None    COVID Suspicion at Admission: N/A    Running Infusions:  None    Mental Status/LOC at time of transport: A&Ox3    Other pertinent information:   CIWA score: N/A   NIH score:  N/A

## 2024-04-17 NOTE — CONSULTS
Cardiology Consultation Note      Micheal Nickerson Patient Status:  Emergency    1931 MRN [id number]   Location [facility] EMERGENCY DEPARTMENT Attending Aquilino Cain,    Hosp Day # 0 PCP Thuan Moody MD     Reason for consultation:  syncope    Impression:  Recent admission for HfPEF exacerbation  Syncope: recent episodes of pauses up to 4 seconds. Fmaily did not want pacemaker at that time  Paroxysmal AFIB  HTN  CAD    Plan:  Not on an AV elda blockers  Continue to monitor on TELE  Will obtain EP consult. Unlikely to see patient prior to Friday. Will allow us to observe for the next 48 hours to see if he has any significant pauses. He has only had pauses <5 seconds on last admission which were nocturnal. No urgent indication at this time for PPM or temporary wire   Hold eliquis starting tomorrow  Contine PO lasix   Recent echo with normal LVEF      History of Present Illness:  Micheal Nickerson is a 89 year old male who presented to [facility] on 2024.    This is a patient of my partner: Dr. Caba.    The patient has a complex past medical history who was recently here for heart failure exacerbation.  Was noted to have up to 4 seconds nocturnal pauses.  Family was offered option of possible pacemaker but declined at that time.  Was discharged yesterday but this morning per daughter    Passed out in front of her.  Had seizure-like activity before passing out.  Currently he denies any dizziness, chest pain or dyspnea on exertion.    Cardiology consultation was requested.    Medications:  No current facility-administered medications for this encounter.       Past Medical History:    ALLERGIC RHINITIS    Arrhythmia    a-fib    Back problem    CANCER    prostate, GL 8 (4+4) L base, GL 6 R base    Chronic rhinitis    Clostridioides difficile infection    treated, no longer symptomatic    Diverticulosis    Diverticulosis    Exposure to medical diagnostic radiation        Heart attack  (HCC)    daughter denies ever having heart attack    Hematuria  PAINLESS    CYCTOSCOPY 1/93   IVP    High blood pressure    History of COVID-19    hospitalized x 4 days. cough/sob. 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 Hernandez    Spine surgery procedure unlisted  1968    Vasectomy         Family History  There is no family history of sudden cardiac death.    Social History   reports that he quit smoking about 60 years ago. His smoking use included pipe. He has never used smokeless tobacco. He reports that he does not drink alcohol and does not use drugs.     Allergies  Allergies   Allergen Reactions    Contrast Dye [Gadolinium Derivatives]          Review of Systems:  Constitutional: negative for fevers  Eyes: negative for visual disturbance  Ears, nose, mouth, throat, and face: negative for epistaxis  Respiratory: negative for dyspnea on exertion  Cardiovascular: negative for chest pain  Gastrointestinal: negative for melena  Genitourinary:negative for hematuria  Hematologic/lymphatic: negative for bleeding  Musculoskeletal:negative for myalgias  Neurological: negative for dizziness and headaches  Endocrine: negative for temperature intolerance      Physical Exam:  Blood pressure 116/90, pulse 74, temperature 97.5 °F (36.4 °C), temperature source Oral, resp. rate 18, height 5' 2\"  (1.575 m), weight 120 lb (54.4 kg), SpO2 93%.  Temp (24hrs), Av.5 °F (36.4 °C), Min:97.5 °F (36.4 °C), Max:97.5 °F (36.4 °C)    Wt Readings from Last 3 Encounters:   24 120 lb (54.4 kg)   24 133 lb 2.5 oz (60.4 kg)   23 152 lb (68.9 kg)       General: Awake and alert; in no acute distress  HEENT: Extraocular movements are intact; sclerae are anicteric; scalp is atrauamatic; no thyromegaly  Neck: Supple; no JVD; no carotid bruits  Cardiac: Regular rate and regular rhythm; no murmurs/rubs/gallops are appreciated; PMI is non-displaced; there is no evidence of a sternal heave  Lungs: Clear to auscultation bilaterally; no accessory muscle use is noted  Abdomen: Soft, non-tender; bowel sounds are normoactive; no hepatosplenomegaly  Extremities: No clubbing or cyanosis; moves all 4 extremities normally  Psychiatric: Normal mood and affect; answers questions appropriately  Dermatologic: No rashes; normal skin turgor    Diagnostic testing:    EKG: AFIB    Labs:   No results found for: \"PT\", \"INR\"     Lab Results   Component Value Date    WBC 3.6 2024    HGB 12.4 2024    HCT 40.0 2024    .0 2024    CREATSERUM 1.12 2024    BUN 33 2024     2024    K 4.1 2024     2024    CO2 34.0 2024    GLU 93 2024    CA 8.7 2024    ALB 2.9 2024    ALKPHO 98 2024    BILT 0.7 2024    TP 6.3 2024    AST 18 2024    ALT 24 2024    MG 2.1 2024    PGLU 101 2024         Thank you for allowing our practice to participate in the care of your patient. Please do not hesitate to contact me if you have any questions.    Kristina Matthews MD

## 2024-04-17 NOTE — ED PROVIDER NOTES
Patient Seen in: [facility] Emergency Department      History     Chief Complaint   Patient presents with    Syncope     Stated Complaint:     Subjective:   HPI    This is a 89-year-old male with history of atrial fibrillation, hypertension, coronary disease presents emergency room for evaluation of syncopal episode.  Patient was standing in kitchen with family, family reports he mentioned he was not feeling well, the eased him to the ground he then reportedly passed out for \"a few minutes \".  No seizure activity was noted, when patient regained conscious he was back to his baseline mental status.  Patient does not recall the event, denies headache or neck pain denies chest pain or shortness of breath denies abdominal pain.  Patient was discharged yesterday from the hospital, was admitted for anasarca/pleural effusion/HFpEF acute on chronic, had echo at that time with EF 55-60%.  Patient was noted to have pauses up to 4 seconds on the monitor, pacemaker was discussed by cardiology and family declined.    Objective:   Past Medical History:    ALLERGIC RHINITIS    Arrhythmia    a-fib    Back problem    CANCER    prostate, GL 8 (4+4) L base, GL 6 R base    Chronic rhinitis    Clostridioides difficile infection    treated, no longer symptomatic    Diverticulosis    Diverticulosis    Exposure to medical diagnostic radiation    2011    Heart attack (HCC)    daughter denies ever having heart attack    Hematuria  PAINLESS    CYCTOSCOPY 1/93   IVP    High blood pressure    History of COVID-19    hospitalized x 4 days. cough/sob. 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 Hernandez    Spine surgery procedure unlisted  1968    Vasectomy                  Social History     Socioeconomic History    Marital status:     Number of children: 3   Occupational History    Occupation: retired     Tobacco Use    Smoking status: Former     Types: Pipe     Quit date:      Years since quittin.3    Smokeless tobacco: Never   Vaping Use    Vaping status: Never Used   Substance and Sexual Activity    Alcohol use: No    Drug use: No    Sexual activity: Not Currently     Partners: Female   Other Topics Concern     Service Yes     Comment: [employer]--[employer]    Exercise Yes    Seat Belt Yes   Social History Narrative    Lives with a daughter and her family, and rotates between them, [place], and [place].          23-Now lives with daughter in [place] all year     Social Determinants of Health     Food Insecurity: No Food Insecurity (2024)    Food Insecurity     Food Insecurity: Never true   Transportation Needs: No Transportation Needs (2024)    Transportation Needs     Lack of Transportation: No   Housing Stability: Low Risk  (2024)    Housing Stability     Housing Instability: No              Review of Systems    Positive for stated complaint:   Other systems are as noted in HPI.  Constitutional and vital signs reviewed.      All other systems reviewed and negative except as noted above.    Physical Exam     ED Triage Vitals   BP 24 0806 100/59   Pulse 24 0806 73   Resp 24 0806 14   Temp 24 0907 97.5 °F (36.4 °C)   Temp src 24 0907 Oral   SpO2 24 0830 94 %   O2 Device 24 0806 None (Room air)       Current:BP  126/61   Pulse 88   Temp 97.5 °F (36.4 °C) (Oral)   Resp 15   Ht 157.5 cm (5' 2\")   Wt 54.4 kg   SpO2 100%   BMI 21.95 kg/m²         Physical Exam    GENERAL: Patient is awake, alert,  in no acute distress.  HEENT: s no scleral icterus.  Mucous membranes are moist.Scalp is atraumatic.  NECK: Neck is supple, there is no nuchal rigidity.  No cervical spine tenderness.    HEART: Irregular regular   LUNGS: Clear to auscultation bilaterally.  No Rales, no rhonchi, no wheezing, no stridor.  ABDOMEN: Soft, nondistended,non tender  EXTREMITIES: No tenderness to the bilateral upper or lower extremities.  Pelvis stable no tenderness bilateral hips   NEUROLOGIC EXAM: Tongue midline, no facial drooping, no ptosis, muscle strength +5/5 bilateral upper and lower extremities   ED Course     Labs Reviewed   COMP METABOLIC PANEL (14) - Abnormal; Notable for the following components:       Result Value    CO2 34.0 (*)     BUN 33 (*)     Calculated Osmolality 303 (*)     Total Protein 6.3 (*)     Albumin 2.9 (*)     A/G Ratio 0.9 (*)     All other components within normal limits   POCT GLUCOSE - Abnormal; Notable for the following components:    POC Glucose 101 (*)     All other components within normal limits   CBC W/ DIFFERENTIAL - Abnormal; Notable for the following components:    WBC 3.6 (*)     HGB 12.4 (*)     .0 (*)     .0 (*)     Lymphocyte Absolute 0.87 (*)     All other components within normal limits   TROPONIN I HIGH SENSITIVITY - Normal   MAGNESIUM - Normal   CBC WITH DIFFERENTIAL WITH PLATELET    Narrative:     The following orders were created for panel order CBC With Differential With Platelet.  Procedure                               Abnormality         Status                     ---------                               -----------         ------                     CBC W/ DIFFERENTIAL[[id number]]          Abnormal            Final result                 Please view results for these tests on the  individual orders.   RAINBOW DRAW LAVENDER   RAINBOW DRAW LIGHT GREEN   RAINBOW DRAW BLUE   RAINBOW DRAW GOLD     EKG    Rate, intervals and axes as noted on EKG Report.  Rate: 76  Rhythm: Atrial Fibrillation  Reading: Atrial fibrillation, no ST elevation.                          MDM        Differential diagnosis before testing includes but not limited to dysrhythmia/arrhythmia, orthostatic/vasovagal episode, electrolyte abnormality, which is a medical condition that poses a threat to life/function    Past Medical History/comorbidities-as in HPI      Radiographic images  I personally reviewed the radiographs and my individual interpretation shows chest x-ray no pneumothorax  I also reviewed the official reports that showed cardiomegaly with moderate tissue opacities likely representing mild edema.  Small left-sided pleural effusion.    History obtained by external source  (EMS, family, law enforcement, )other sources of medical information included history per son-in-law and daughter at bedside as in HPI    Discussion of management (consult/physicians, social work, pharmacy,ect) krista cardiology and hospitalist    External chart review included recent hospital medical records as in HPI      Course of Events during Emergency Room Visit include patient placed on cardiac monitor and pulse ox, chest x-ray performed.  CBC white count 3.6 hemoglobin 12.4 platelet 142.  Magnesium 2.1.  Troponin 19.  Chemistry sodium 143 potassium 4.1 BUN 33 creatinine 1.12 glucose 93.  Patient has not had any pauses on monitor throughout ER visit, patient has had episodes of bradycardia down to 40s but blood pressure has remained stable.  Discussed with krista cardiology, family is now willing to have pacemaker placed, will admit for further evaluation and treatment.  Discussed with krista hospitalist.    Shared decision making was utilized           Disposition:    Admission  I have discussed with the patient the results of test, differential  diagnosis, and treatment plan. They expressed clear understanding of these instructions and agrees to the plan provided.     Note to patient: The 21st Century Cures Act makes medical notes like these available to patients in the interest of transparency. However, this is a medical document intended as peer to peer communication. It is written in medical language and may contain abbreviations or verbiage that are unfamiliar. It may appear blunt or direct. Medical documents are intended to carry relevant information, facts as evident, and the clinical opinion of the practitioner.           Admission disposition: 4/17/2024  9:56 AM                                        Medical Decision Making      Disposition and Plan     Clinical Impression:  1. Syncope and collapse    2. Bradycardia         Disposition:  Admit  4/17/2024  9:56 am    Follow-up:  No follow-up provider specified.        Medications Prescribed:  Current Discharge Medication List                            Hospital Problems       Present on Admission  Date Reviewed: 4/6/2024            ICD-10-CM Noted POA    * (Principal) Syncope and collapse R55 4/17/2024 Unknown

## 2024-04-17 NOTE — PROGRESS NOTES
Patient admitted from ER. Patient alert and oriented to self, place and time, forgetfulness noted. Informed patient of skin skin check completed with second RN Judit. Cardiology rounded on patient in ER and updated family on plan. Call light with in reach, fall precautions reviewed, all questions answered.

## 2024-04-18 PROBLEM — Z71.89 COUNSELING REGARDING ADVANCE CARE PLANNING AND GOALS OF CARE: Status: ACTIVE | Noted: 2024-04-18

## 2024-04-18 PROBLEM — Z51.5 PALLIATIVE CARE ENCOUNTER: Status: ACTIVE | Noted: 2024-04-18

## 2024-04-18 LAB
ANION GAP SERPL CALC-SCNC: 3 MMOL/L (ref 0–18)
BASOPHILS # BLD AUTO: 0.01 X10(3) UL (ref 0–0.2)
BASOPHILS NFR BLD AUTO: 0.3 %
BUN BLD-MCNC: 36 MG/DL (ref 9–23)
CALCIUM BLD-MCNC: 8.3 MG/DL (ref 8.5–10.1)
CHLORIDE SERPL-SCNC: 108 MMOL/L (ref 98–112)
CO2 SERPL-SCNC: 33 MMOL/L (ref 21–32)
CREAT BLD-MCNC: 1.04 MG/DL
EGFRCR SERPLBLD CKD-EPI 2021: 67 ML/MIN/1.73M2 (ref 60–?)
EOSINOPHIL # BLD AUTO: 0.08 X10(3) UL (ref 0–0.7)
EOSINOPHIL NFR BLD AUTO: 2.5 %
ERYTHROCYTE [DISTWIDTH] IN BLOOD BY AUTOMATED COUNT: 15 %
GLUCOSE BLD-MCNC: 89 MG/DL (ref 70–99)
HCT VFR BLD AUTO: 36.7 %
HGB BLD-MCNC: 11.6 G/DL
IMM GRANULOCYTES # BLD AUTO: 0.01 X10(3) UL (ref 0–1)
IMM GRANULOCYTES NFR BLD: 0.3 %
LYMPHOCYTES # BLD AUTO: 0.8 X10(3) UL (ref 1–4)
LYMPHOCYTES NFR BLD AUTO: 24.7 %
MCH RBC QN AUTO: 31.7 PG (ref 26–34)
MCHC RBC AUTO-ENTMCNC: 31.6 G/DL (ref 31–37)
MCV RBC AUTO: 100.3 FL
MONOCYTES # BLD AUTO: 0.48 X10(3) UL (ref 0.1–1)
MONOCYTES NFR BLD AUTO: 14.8 %
NEUTROPHILS # BLD AUTO: 1.86 X10 (3) UL (ref 1.5–7.7)
NEUTROPHILS # BLD AUTO: 1.86 X10(3) UL (ref 1.5–7.7)
NEUTROPHILS NFR BLD AUTO: 57.4 %
OSMOLALITY SERPL CALC.SUM OF ELEC: 306 MOSM/KG (ref 275–295)
PLATELET # BLD AUTO: 119 10(3)UL (ref 150–450)
POTASSIUM SERPL-SCNC: 4 MMOL/L (ref 3.5–5.1)
RBC # BLD AUTO: 3.66 X10(6)UL
SODIUM SERPL-SCNC: 144 MMOL/L (ref 136–145)
WBC # BLD AUTO: 3.2 X10(3) UL (ref 4–11)

## 2024-04-18 PROCEDURE — 99223 1ST HOSP IP/OBS HIGH 75: CPT | Performed by: NURSE PRACTITIONER

## 2024-04-18 NOTE — PROGRESS NOTES
[facility] Cardiology Progress Note        Micheal Nickerson Patient Status:  Inpatient    1931 MRN [id number]   [facility] Attending Peter Mark MD   Hosp Day # 1 PCP Thuan Moody MD     Subjective:  The patient denies  chest pain and shortness of breath.    Medications:   terazosin  10 mg Oral Nightly    [Held by provider] apixaban  2.5 mg Oral BID    furosemide  40 mg Oral Daily    montelukast  10 mg Oral QPM       Continuous Infusions:        Allergies:  Allergies   Allergen Reactions    Contrast Dye [Gadolinium Derivatives]          Objective:        Intake/Output:      Intake/Output Summary (Last 24 hours) at 2024 0909  Last data filed at 2024 0600  Gross per 24 hour   Intake 280 ml   Output 450 ml   Net -170 ml     Wt Readings from Last 3 Encounters:   24 136 lb 1.6 oz (61.7 kg)   24 133 lb 2.5 oz (60.4 kg)   23 152 lb (68.9 kg)       Physical Exam:        Vitals:    24 2119 24 2300 24 0530 24 0757   BP: 92/78 97/61 132/90 91/66   BP Location:  Right arm Right arm Left arm   Pulse: 70 65 68 70   Resp: 15 15 14 13   Temp:  96.8 °F (36 °C) 97.7 °F (36.5 °C) 97.9 °F (36.6 °C)   TempSrc:  Axillary Oral Axillary   SpO2: 93% 98% 94% 98%   Weight:       Height:           Temp:  [96.3 °F (35.7 °C)-97.9 °F (36.6 °C)] 97.9 °F (36.6 °C)  Pulse:  [56-88] 70  Resp:  [12-27] 13  BP: ()/(60-93) 91/66  SpO2:  [92 %-100 %] 98 %      Temp: 97.9 °F (36.6 °C)  Pulse: 70  Resp: 13  BP: 91/66  General:  Appears comfortable  HEENT: No focal deficits.  Neck: No JVD, carotids 2+ no bruits.  Cardiac: Irregular S1S2.  No S3, S4, rub, click.  No murmur.  Lungs: Clear to auscultation and percussion.  Abdomen: Soft, non-tender.   Extremities: No LE edema.  No clubbing or cyanosis.    Neurologic: Alert and oriented, normal affect.  Skin: Warm and dry.           LABS:      HEM:  Recent Labs   Lab 24  0230  04/15/24  0422 04/16/24  0543 04/17/24  0816 04/18/24  0637   WBC 3.1* 3.0* 3.5* 3.6* 3.2*   HGB 12.0* 12.2* 12.5* 12.4* 11.6*   HCT 36.8* 36.4* 37.5* 40.0 36.7*   .0* 124.0* 137.0* 142.0* 119.0*       Chem:  Recent Labs   Lab 04/12/24  1455 04/13/24  0541 04/14/24  0459 04/15/24  0422 04/16/24  0543 04/17/24  0816 04/18/24  0637   *   < > 145 143 146* 143 144   K 3.8   < > 3.7 3.8  3.8 4.4  4.4 4.1 4.0      < > 106 105 105 103 108   CO2 29.0   < > 37.0* 37.0* 36.0* 34.0* 33.0*   BUN 27*   < > 23 28* 31* 33* 36*   CREATSERUM 1.19   < > 1.06 1.06 1.17 1.12 1.04   CA 8.4*   < > 8.1* 8.2* 8.5 8.7 8.3*   MG 2.3  --  1.9 2.1  --  2.1  --    GLU 61*   < > 97 101* 92 93 89    < > = values in this interval not displayed.       Recent Labs   Lab 04/12/24  1455 04/14/24 0459 04/17/24  0816   ALT 33 30 24   AST 21 15 18   ALB 2.9* 2.9* 2.9*       No results for input(s): \"PT\", \"PTT\", \"INR\" in the last 168 hours.        Lab Results   Component Value Date    TROP <0.046 02/01/2016         Invalid input(s): \"PBNPML\"                       Diagnostics:   Telemetry: Atrial Fibrillation, 70's.  Overnight , HR's dipped into 30's at times.  Max pause overnight was 4.0 sec    EKG, 4/18/2024,         Echo:      Cardiac Cath:              Impression:    Recent admission for HfPEF exacerbation  Syncope: recent episodes of pauses up to 4 seconds. Fmaily did not want pacemaker at that time but is now amenable to PPM  Paroxysmal AFIB  HTN  CAD     Plan:  Not on an AV elda blockers  Continue to monitor on TELE  EP consult. Unlikely to see patient prior to Friday. Will allow us to observe for the next 48 hours to see if he has any significant pauses. He has only had pauses <5 seconds on last admission which were nocturnal. No urgent indication at this time for PPM or temporary wire   Hold eliquis   Contine PO lasix   Recent echo with normal LVEF      Gibson Alonso MD  4/18/2024  9:09 AM

## 2024-04-18 NOTE — CM/SW NOTE
Spoke with patients daughters--margie and severino regarding discharge planning. Acknowledged that they had met with Claudette with Palliative care--wanting \"only the best for Dad.\" Now wish hospice informational meeting with [facility].  [facility] referral sent--spoke with Marli (phone )--they have reached out to daughter--to meet tonight between 6-6:30 pm    Shared with both daughters--recommendation continue to be home with [facility]--daughter expressing patient \"discharged too quickly . . .\"  Margie shared that her dad has been  since 1992 and also lost his girlfriend--she has noticed a decline in the past 3 months.  Explained rehab or TOMI option traditionally made by therapists with use of medicare guidelines--per their request--[facility] referrals sent--ultimately, facilities to review and apply medicare criteria--with patient's insurance--would require insurance auth.if facility clinically available.   When asked how patient would respond to possible TOMI stay--both stated he \"wants to come home . . . . They really would love [facility]'s if able to accept.   Additionally discussed option of respite stay (private pay) which unfortunately \"not an option\".  If above not the direction they wish to go--CM/SW to remain available to assist with equipment, service arrangement (pt just set up with [facility])  emotional support given to daughters--they expressed appreciation.  Also told them about [facility] and [facility]--contact.  Margie provided that she just was able to get 12-hours of senior services through [facility] to see patient--3--4-hour shifts per week--totaling 12-hours.      PASSR completed and uploaded in [facility] TOMI referrals

## 2024-04-18 NOTE — PHYSICAL THERAPY NOTE
PHYSICAL THERAPY EVALUATION - INPATIENT     Room Number: 2608/2608-A  Evaluation Date: 4/18/2024  Type of Evaluation: Initial  Physician Order: PT Eval and Treat    Presenting Problem: syncope and collapse  Co-Morbidities : afib, prostate cancer, HTN, covid, MI, back surgery, CHF  Reason for Therapy: Mobility Dysfunction and Discharge Planning    PHYSICAL THERAPY ASSESSMENT   Patient is currently functioning below baseline with bed mobility, transfers, and gait.  Prior to admission, patient's baseline is mod ind with RW.  Patient is requiring contact guard assist and minimal assist as a result of the following impairments: decreased functional strength, decreased endurance/aerobic capacity, decreased muscular endurance, and increased O2 needs from baseline.  Physical Therapy will continue to follow for duration of hospitalization.    Patient will benefit from continued skilled PT Services at discharge to promote functional independence and safety with additional support and return home with home health PT.    PLAN  PT Treatment Plan: Bed mobility;Endurance;Energy conservation;Patient education;Family education;Gait training;Strengthening;Transfer training;Balance training  Rehab Potential : Good  Frequency (Obs): 3-5x/week  Number of Visits to Meet Established Goals: 4      CURRENT GOALS    Goal #1 Patient is able to demonstrate supine - sit EOB @ level: supervision     Goal #2 Patient is able to demonstrate transfers Sit to/from Stand at assistance level: supervision     Goal #3 Patient is able to ambulate 100 feet with assist device: walker - rolling at assistance level: supervision     Goal #4    Goal #5    Goal #6    Goal Comments: Goals established on 4/18/2024      PHYSICAL THERAPY MEDICAL/SOCIAL HISTORY  History related to current admission: Patient is a 89 year old male admitted on 4/17/2024 from home for syncope and collapse.  Pt diagnosed with afib, bradycardia.    Recent Admission:   4/12-4/16/24 -  CHF exacerbation, pleural effusion - DC w/HHC    HOME SITUATION  Type of Home: House   Home Layout: Two level;Able to live on main level                Lives With: Daughter;Family  Drives: No  Patient Owned Equipment: Rolling walker;Rollator  Patient Regularly Uses: Glasses    Prior Level of Barry: Pt is typically able to ambulate short distance with RW mod ind and is mod ind with ADLs.     SUBJECTIVE  \"All you young folk are great taking care of an old turkey like me\"       OBJECTIVE  Precautions: Bed/chair alarm;Hard of hearing  Fall Risk: High fall risk    WEIGHT BEARING RESTRICTION  Weight Bearing Restriction: None                PAIN ASSESSMENT  Ratin          COGNITION  Orientation Level:  oriented to place, oriented to person, disoriented to time, and disoriented to situation  Following Commands:  follows one step commands with increased time and follows one step commands with repetition  Awareness of Errors:  assistance required to identify errors made, assistance required to correct errors made, and decreased awareness of errors   Problem Solving:  assistance required to identify errors made, assistance required to generate solutions, and assistance required to implement solutions    RANGE OF MOTION AND STRENGTH ASSESSMENT  Upper extremity ROM and strength are within functional limits     Lower extremity ROM is within functional limits     Lower extremity strength is within functional limits; except mild deconditioning      BALANCE  Static Sitting: Good  Dynamic Sitting: Fair +  Static Standing: Poor +  Dynamic Standing: Poor +    ADDITIONAL TESTS                                    ACTIVITY TOLERANCE   BP supine: 160/91   BP sittin/106   BP standin/70       Pt denies dizziness. RN notified.           O2 WALK  Oxygen Therapy  SPO2% on Oxygen at Rest: 98  At rest oxygen flow (liters per minute): 2    NEUROLOGICAL FINDINGS                        AM-PAC '6-Clicks' INPATIENT SHORT FORM -  BASIC MOBILITY  How much difficulty does the patient currently have...  Patient Difficulty: Turning over in bed (including adjusting bedclothes, sheets and blankets)?: A Little   Patient Difficulty: Sitting down on and standing up from a chair with arms (e.g., wheelchair, bedside commode, etc.): A Little   Patient Difficulty: Moving from lying on back to sitting on the side of the bed?: A Little   How much help from another person does the patient currently need...   Help from Another: Moving to and from a bed to a chair (including a wheelchair)?: A Little   Help from Another: Need to walk in hospital room?: A Little   Help from Another: Climbing 3-5 steps with a railing?: A Lot       AM-PAC Score:  Raw Score: 17   Approx Degree of Impairment: 50.57%   Standardized Score (AM-PAC Scale): 42.13   CMS Modifier (G-Code): CK    FUNCTIONAL ABILITY STATUS  Gait Assessment   Functional Mobility/Gait Assessment  Gait Assistance: Contact guard assist  Distance (ft): 3  Assistive Device: Rolling walker  Pattern: Shuffle (kyphotic pressure)    Skilled Therapy Provided: Per RN okay to work with pt. Pt received in supine and was agreeable to PT session.     Bed Mobility:  Rolling: NT  Supine to sit: min A    Sit to supine: NT     Transfer Mobility:  Sit to stand: min A    Stand to sit: CGA  Gait = pt ambulated from bed to chair with RW and CGA    Therapist's Comments: Pt educated on role of therapy, goals for session, safety, fall prevention, and activity recommendations.     Exercise/Education Provided:  Bed mobility  Energy conservation  Functional activity tolerated  Gait training  Posture  Strengthening  Transfer training    Patient End of Session: Up in chair;Needs met;Call light within reach;RN aware of session/findings;All patient questions and concerns addressed;Alarm set;Family present;Discussed recommendations with /      Patient Evaluation Complexity Level:  History Moderate - 1 or 2 personal  factors and/or co-morbidities   Examination of body systems Low - addressing 1-2 elements   Clinical Presentation Low - Stable   Clinical Decision Making Low - Stable       PT Session Time: 25 minutes  Therapeutic Activity: 8 minutes

## 2024-04-18 NOTE — PROGRESS NOTES
[facility] AND [facility]   part of [facility]     Progress Note  Micheal Nickerson Patient Status:  Inpatient    1931 MRN [id number]   Location [facility] 2NE-A Attending Peter Mark MD   Hosp Day # 1 PCP Thuan Moody MD       SEE ATTENDING NOTE AT BOTTOM OF PAGE    Is this a shared or split note between Advanced Practice Provider and Physician? Yes    Assessment and Plan:    89 year old male from home with a PMHx sig for afib, CAD,  HFpEF, prostate CA,  h/o cdiff, diverticulosis and HTN presents to the hospital with syncope and collapse.     Syncope and collapse  -passed out while in kitchen with family, no injury reported as he was eased to the ground  -ECG w controlled rate AFIB   -troponin normal  -cardiology consulted, plan for EP consult, likely won't see the patient until Friday, no urgent need for PPM at this time, monitor on tele for pauses  -ECHO from 24, EF 55-60  -Etiology of syncope is unclear, patient has been off of elda blocking agents, heart rate will have to be extremely low with pauses in order to have a profound syncopal episode   -Will obtain CT head in the setting of Eliquis use >> neg for acute process      Bradycardia  Pauses  -monitor on tele for pauses, still having overnight bradycardia episodes  -family was not interested in PPM last hospitalization, given new syncopal event they are now willing to consider  -EP to evaluate     Afib  -rate controlled  -eliquis,on hold      HFpEF  -no signs of vol overload upon physical exam  -lasix     BPH  -terazosin     Voluntary GOC discussion with patient, POA/daughter Kathie confirms they have not made this decision yet.  They wanted him to decide on the last hospitalization but he was too confused.  They would like to discuss this as a family and are ok with a palliative care consult to help guide them with GOC.     MA/ACO Reach  -Re- Entry: yes  -Consults: cards, EP  -Discharge Needs:  TBD  -Appointments: PCP       FERMIN crowder in am  -diet-cardiac     Prophy  -SCD     Dispo  -pending clinical course      PCP: Thuan Moody MD    Concerns regarding plan of care were discussed with patient. Patient agrees with plan as detailed above. Discussed plan of care with Dr. Machado    Note: This chart was prepared using voice recognition software and may contain unintended word substitution errors.          Ron Garcia EULOGIO  [facility] Hospitalist Team  Contact via [employer] (Check Availability)  4/18/2024             SUBJECTIVE:   In a chair  States he is \"A-ok\"  Daughter at bedside               OBJECTIVE:   Blood pressure 91/66, pulse 70, temperature 97.9 °F (36.6 °C), temperature source Axillary, resp. rate 13, height 5' 2\" (1.575 m), weight 136 lb 1.6 oz (61.7 kg), SpO2 98%.    GENERAL: no apparent distress  NEURO: A/A Ox2-3  RESP: non labored, CTA, on 02, sat 97  CARDIO: irregular  ABD: soft, NT, ND, BS+  EXTREMITIES: no edema, no calf tenderness    DIAGNOSTIC DATA:   Labs:     Recent Labs   Lab 04/14/24  0459 04/15/24  0422 04/16/24  0543 04/17/24  0816 04/18/24  0637   WBC 3.1* 3.0* 3.5* 3.6* 3.2*   HGB 12.0* 12.2* 12.5* 12.4* 11.6*   MCV 97.1 95.8 97.4 101.0* 100.3*   .0* 124.0* 137.0* 142.0* 119.0*       Recent Labs   Lab 04/12/24  1455 04/13/24  0541 04/14/24  0459 04/15/24  0422 04/16/24  0543 04/17/24  0816 04/18/24  0637   *   < > 145 143 146* 143 144   K 3.8   < > 3.7 3.8  3.8 4.4  4.4 4.1 4.0      < > 106 105 105 103 108   CO2 29.0   < > 37.0* 37.0* 36.0* 34.0* 33.0*   BUN 27*   < > 23 28* 31* 33* 36*   CREATSERUM 1.19   < > 1.06 1.06 1.17 1.12 1.04   CA 8.4*   < > 8.1* 8.2* 8.5 8.7 8.3*   MG 2.3  --  1.9 2.1  --  2.1  --    GLU 61*   < > 97 101* 92 93 89    < > = values in this interval not displayed.       Recent Labs   Lab 04/12/24  1455 04/14/24  0459 04/17/24  0816   ALT 33 30 24   AST 21 15 18   ALB 2.9* 2.9* 2.9*       Recent Labs    Lab 04/12/24  1634 04/17/24  0857   PGLU 80 101*       No results for input(s): \"TROP\" in the last 168 hours.      MEDICATIONS      Current Facility-Administered Medications   Medication Dose Route Frequency    acetaminophen (Tylenol Extra Strength) tab 500 mg  500 mg Oral Q4H PRN    polyethylene glycol (PEG 3350) (Miralax) 17 g oral packet 17 g  17 g Oral Daily PRN    sennosides (Senokot) tab 17.2 mg  17.2 mg Oral Nightly PRN    bisacodyl (Dulcolax) 10 MG rectal suppository 10 mg  10 mg Rectal Daily PRN    fleet enema (Fleet) 7-19 GM/118ML rectal enema 133 mL  1 enema Rectal Once PRN    ondansetron (Zofran) 4 MG/2ML injection 4 mg  4 mg Intravenous Q6H PRN    metoclopramide (Reglan) 5 mg/mL injection 5 mg  5 mg Intravenous Q8H PRN    terazosin (Hytrin) cap 10 mg  10 mg Oral Nightly    [Held by provider] apixaban (Eliquis) tab 2.5 mg  2.5 mg Oral BID    furosemide (Lasix) tab 40 mg  40 mg Oral Daily    montelukast (Singulair) tab 10 mg  10 mg Oral QPM                  IMAGING     CT BRAIN OR HEAD (45901)    Result Date: 4/17/2024  CONCLUSION:  No acute intracranial process.    LOCATION:  [facility]   Dictated by (CST): Ross Gray MD on 4/17/2024 at 9:11 PM     Finalized by (CST): Ross Gray MD on 4/17/2024 at 9:12 PM       XR CHEST AP PORTABLE  (CPT=71045)    Result Date: 4/17/2024  CONCLUSION:  1. Cardiomegaly with mild interstitial opacities likely representing mild edema. 2. Small left-sided pleural effusion with left basilar atelectasis/infiltrates.    LOCATION:  [facility]      Dictated by (CST): Camelia Figueroa MD on 4/17/2024 at 8:55 AM     Finalized by (CST): Camelia Figueroa MD on 4/17/2024 at 8:55 AM          SEE ATTENDING NOTE BELOW:     Patient seen and examined independently.  Discussed with APN and agree with note above.      S: seen and examined at bs. Family at bedside. No new complains. Overnight HR noted to be in 30's while sleeping, needed some O2 at that time as well.     objective  /76  (BP Location: Left arm)   Pulse 80   Temp 97.5 °F (36.4 °C) (Axillary)   Resp 22   Ht 5' 2\" (1.575 m)   Wt 136 lb 1.6 oz (61.7 kg)   SpO2 96%   BMI 24.89 kg/m²     Gen: No acute distress, alert and oriented x 2-3  Neck Supple, no JVD  Pulm: Lungs clear, normal respiratory effort, No wheezing or crackles  CV: Heart with regular rate and rhythm, No murmurs, rubs, gallops  Abd: Abdomen soft, nontender, nondistended, no organomegaly, bowel sounds present  MSK:  no clubbing, no cyanosis.  No Lower extremity edema  Psych: mood stable, cooperative  Neuro: no focal deficits, alert oriented x 2-3 (at baseline), 5 out of 5 strength upper and lower extremities, able to answer questions appropriately and follow commands        Assessment and Plan     89 year old male from home with a PMHx sig for afib, CAD,  HFpEF, prostate CA,  h/o cdiff, diverticulosis and HTN presents to the hospital with syncope and collapse.     Syncope and collapse  Rule out spontaneous hemorrhagic bleed  Bradycardia  Pauses  Afib  HFpEF  BPH     Plan:      - Telemetry- reviewed  - Cardiology evaluation, recs noted  - Etiology of syncope is unclear, patient has been off of elda blocking agents, heart rate would  have to be extremely low with pauses in order to have a profound syncopal episode  - CT head reviewed, no acute issues  - Will hold Eliquis for now  - Discussed with family.  Answered all questions.        Rest as above with above     Helder Machado DO  Hospitalist  Formerly [facility], [facility] and [facility]

## 2024-04-18 NOTE — PLAN OF CARE
Patient alert and oriented x2-3, forgetful. Afib on tele. Low HR overnight nonsustained, asymptomatic. MD aware. Maintaining o2 sats on RA. Needing 2L NC overnight. Briefed/incontinent at times, primofit in place. No c/o pain. Updated patient and patient's daughter at bedside on POC, verbalized understanding. Safety precautions put in place, bed alarm on.       Problem: Patient/Family Goals  Goal: Patient/Family Long Term Goal  Description: Patient's Long Term Goal: to go home    Interventions:  - tele monitoring, labs, EP consultation, increase activity, skin care    - See additional Care Plan goals for specific interventions  Outcome: Progressing  Goal: Patient/Family Short Term Goal  Description: Patient's Short Term Goal: feel better    Interventions:   - - tele monitoring, labs, EP consultation, increase activity, skin care    - See additional Care Plan goals for specific interventions  Outcome: Progressing

## 2024-04-18 NOTE — CM/SW NOTE
Attempted to meet with patient/family to discuss discharge planning--daughter lovely with whom patient lives with will be in later today--will revisit when here she arrives--patient's eldest daughter Reena @ bedside

## 2024-04-18 NOTE — PLAN OF CARE
Assumed care of patient from [facility] RN. Patient is A&O x3, up standby assist x1 up with walker no complaints of pain.     Patient is afib rates controlled on tele. On 2L nasal cannula, due to desat with sleep. Patient dozing off during day, per patient and family, dozes off in chair frequently during the day at home.     Patient incontinent of bladder continent of bowel. External catheter in place. Fall precautions in place, patient family in room.       Problem: Patient/Family Goals  Goal: Patient/Family Long Term Goal  Description: Patient's Long Term Goal: to go home    Interventions:  - tele monitoring, labs, EP consultation, increase activity, skin care    - See additional Care Plan goals for specific interventions  Outcome: Not Progressing  Goal: Patient/Family Short Term Goal  Description: Patient's Short Term Goal: feel better    Interventions:   - - tele monitoring, labs, EP consultation, increase activity, skin care    - See additional Care Plan goals for specific interventions  Outcome: Not Progressing     Problem: CARDIOVASCULAR - ADULT  Goal: Maintains optimal cardiac output and hemodynamic stability  Description: INTERVENTIONS:  - Monitor vital signs, rhythm, and trends  - Monitor for bleeding, hypotension and signs of decreased cardiac output  - Evaluate effectiveness of vasoactive medications to optimize hemodynamic stability  - Monitor arterial and/or venous puncture sites for bleeding and/or hematoma  - Assess quality of pulses, skin color and temperature  - Assess for signs of decreased coronary artery perfusion - ex. Angina  - Evaluate fluid balance, assess for edema, trend weights  Outcome: Not Progressing  Goal: Absence of cardiac arrhythmias or at baseline  Description: INTERVENTIONS:  - Continuous cardiac monitoring, monitor vital signs, obtain 12 lead EKG if indicated  - Evaluate effectiveness of antiarrhythmic and heart rate control medications as ordered  - Initiate emergency measures for  life threatening arrhythmias  - Monitor electrolytes and administer replacement therapy as ordered  Outcome: Not Progressing     Problem: SKIN/TISSUE INTEGRITY - ADULT  Goal: Incision(s), wounds(s) or drain site(s) healing without S/S of infection  Description: INTERVENTIONS:  - Assess and document risk factors for pressure ulcer development  - Assess and document skin integrity  - Assess and document dressing/incision, wound bed, drain sites and surrounding tissue  - Implement wound care per orders  - Initiate isolation precautions as appropriate  - Initiate Pressure Ulcer prevention bundle as indicated  Outcome: Not Progressing     Problem: Impaired Functional Mobility  Goal: Achieve highest/safest level of mobility/gait  Description: Interventions:  - Assess patient's functional ability and stability  - Promote increasing activity/tolerance for mobility and gait  - Educate and engage patient/family in tolerated activity level and precautions    Outcome: Not Progressing

## 2024-04-18 NOTE — OCCUPATIONAL THERAPY NOTE
OCCUPATIONAL THERAPY EVALUATION - INPATIENT     Room Number: 2608/2608-A  Evaluation Date: 4/18/2024  Type of Evaluation: Initial  Presenting Problem: syncope    Physician Order: IP Consult to Occupational Therapy  Reason for Therapy: ADL/IADL Dysfunction and Discharge Planning    OCCUPATIONAL THERAPY ASSESSMENT   Patient is currently functioning near baseline with toileting and dynamic standing balance. Prior to admission, patient's baseline is min A LB dressing, family assistance sponge bathing, intermittent assistance with toileting needs, and supervision with RW.  Patient is requiring stand-by assist and contact guard assist as a result of the following impairments: cognitive deficits (baseline dementia). Occupational Therapy will continue to follow for duration of hospitalization.    ** As noted above, pt already has assistance with some ADL at home. Rehab potential is GUARDED. In the next session, OT will focus on patient and/or family education on  home safety and energy conservation principles.    Patient will benefit from continued skilled OT Services at discharge to promote functional independence and safety with additional support and return home with home health OT      History Related to Current Admission:  Pt was admitted from home on 4/17 after having a syncopal episode in the kitchen. Pt was assisted by the family member tp the ground.  Admitted for syncope, bradycardia, and A-fib.     Recent admission:  4/12 to 4/16/24 for pleural effusion and CHF-> home with home health    Co-Morbidities : afib, prostate cancer, HTN, covid, MI, back surgery, CHF    WEIGHT BEARING RESTRICTION  Weight Bearing Restriction: None                Recommendations for nursing staff:   Transfers: supervision  Toileting location: toilet    EVALUATION SESSION:  Patient Start of Session: supine  FUNCTIONAL TRANSFER ASSESSMENT  Sit to Stand: Edge of Bed  Edge of Bed: Supervision  Toilet Transfer: Supervision    BED  MOBILITY  Supine to Sit : Independent  Sit to Supine (OT): Supervision    O2 SATURATIONS  Oxygen Therapy  SPO2% on Room Air at Rest: 96    COGNITION  Following Commands:  follows one step commands with repetition  Initiation: hand over hand to initiate tasks  Safety Judgement:  decreased awareness of need for assistance and decreased awareness of need for safety  Awareness of Deficits:  not aware of deficits  Problem Solving:  assistance required to identify errors made, assistance required to generate solutions, and assistance required to implement solutions    Upper Extremity   ROM: within functional limits   Strength: within functional limits   Coordination  Gross motor:   Fine motor: intact    EDUCATION PROVIDED  Patient: Role of Occupational Therapy; Plan of Care; Functional Transfer Techniques; Fall Prevention  Patient's Response to Education: Returned Demonstration    Equipment used: rw  Demonstrates functional use,      Therapist comments: Pleasant. Pt's daughter was present during the session. Pt prefers to be called gradpa. Supine to sit independent level.  Seated BP 98/64, standing /60, no dizziness, HR 78-82.   Supervision to ambulate to bathroom, supervision toilet transfer stand to sit,and supervision sit to stand from toilet. 1 cueing provided to use grab bar for support. Static standing balance SBA while OT adjusted the gown.  SBA dynamic standing balance while pt stood by the sink to place an item from one side to the other side of the counter.  Supervision sit to supine. Alarm on.  Informed RN about the session.     Patient End of Session: In bed;Needs met;Call light within reach;With  staff;RN aware of session/findings;All patient questions and concerns addressed;Alarm set;Family present    OCCUPATIONAL PROFILE    HOME SITUATION  Type of Home: House  Home Layout: Two level;Able to live on main level  Lives With: Daughter;Family    Toilet and Equipment: Standard height toilet  Shower/Tub  and Equipment: Other (Comment) (sponge bath)  Other Equipment:  (rw and rollator)          Drives: No  Patient Regularly Uses: Glasses    Prior Level of Function: per daughter, family assists with LB dressing, sponge bathing, and sometimes with toileting hygiene needs. Uses rw or rollator.  Supervision for safety.  Per OT note from 24. \" Pt's KARINA reports that pt's dementia is advanced at this point and that they have to repeat things very frequently and pt at times does not recognize his own daughters. Pt uses a RW at home. Pt spends a lot of time in the bathroom (sometimes upwards of 3.5 hours due to frequently having the urge to use the bathroom) Pt's daughter and KARINA have converted their dining room into a bedroom for the pt in the 1st floor.\"    SUBJECTIVE   \"I can do that\" about toile transfer    PAIN ASSESSMENT  Ratin          OBJECTIVE  Precautions: Bed/chair alarm;Hard of hearing  Fall Risk: High fall risk      ASSESSMENTS    AM-PAC ‘6-Clicks’ Inpatient Daily Activity Short Form  -   Putting on and taking off regular lower body clothing?: A Little  -   Bathing (including washing, rinsing, drying)?: A Little  -   Toileting, which includes using toilet, bedpan or urinal? : A Little  -   Putting on and taking off regular upper body clothing?: None  -   Taking care of personal grooming such as brushing teeth?: None  -   Eating meals?: None    AM-PAC Score:  Score: 21  Approx Degree of Impairment: 32.79%  Standardized Score (AM-PAC Scale): 44.27    ADDITIONAL TESTS     NEUROLOGICAL FINDINGS      COGNITION ASSESSMENTS       PLAN  OT Treatment Plan: Energy conservation/work simplification techniques;Patient/Family education  Rehab Potential : Guarded  Frequency: 3x/week  Number of Visits to Meet Established Goals: 2    ADL Goals   Patient will perform toileting: with stand by assist      UE Exercise Program Goal  Patient will be supervision with bilateral AROM HEP (home exercise program).    Additional  Goals  Pt and or family will incorporate 2 energy conservation techniques into ADL with 2 cueing.    Pt and or family will recall at least 3 home safety recommendation ideas.      Patient Evaluation Complexity Level:   Occupational Profile/Medical History LOW - Brief history including review of medical or therapy records    Specific performance deficits impacting engagement in ADL/IADL LOW  1 - 3 performance deficits    Client Assessment/Performance Deficits MODERATE - Comorbidities and min to mod modifications of tasks    Clinical Decision Making LOW - Analysis of occupational profile, problem-focused assessments, limited treatment options    Overall Complexity LOW     OT Session Time:  17 minutes  Self-Care Home Management: 8 minutes  Therapeutic Activity: 4 minutes

## 2024-04-18 NOTE — CONSULTS
[facility]   part of [facility]  Palliative Care Initial Consult    Micheal Nickerson Patient Status:  Inpatient    1931 MRN [id number]   Location [facility] 2NE-A Attending Peter Mark MD   Hosp Day # 1 PCP Thuan Moody MD   2608/2608-A     Date of Consult: 24       History of Present Illness:        Micheal Nickerson is a 89 year old male with PMH of HFpEF, pAF, in addition to the other conditions noted below, presented to ED on 2024 with CC of syncope and collapse at home. Pt was admitted for further evaluation and management of syncope and collapse, bradycardia with pauses, - cardiology on consult with input no urgent need for PPM at this time - and may not be in line w [facility], plan for EP consult ; HFpEF, Afib, BPH  Hospital course includes ongoing PO diuresis.    Of note, he was hospitalized  -  for anasarca, pleural effusion, HFpEF, pauses and PPM was discussed by cardiology, and declined by family.    Our palliative service was asked by KERVIN Vazquez to evaluate for palliative care needs and support with Goals of care discussion;Advance care planning / HPOA;Establish palliative care.      Today is day #1 of hospital stay. This is the 2nd hospitalization in the past 6 months.    Medical History: obtained from [facility]  Past Medical History:    ALLERGIC RHINITIS    Arrhythmia    a-fib    Back problem    CANCER    prostate, GL 8 (4+4) L base, GL 6 R base    Chronic rhinitis    Clostridioides difficile infection    treated, no longer symptomatic    Diverticulosis    Diverticulosis    Exposure to medical diagnostic radiation        Heart attack (HCC)    daughter denies ever having heart attack    Hematuria  PAINLESS    CYCTOSCOPY    IVP    High blood pressure    History of COVID-19    hospitalized x 4 days. cough/sob. 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 Hernandez    Spine surgery procedure unlisted  1968    Vasectomy         Allergies:  Allergies   Allergen Reactions    Contrast Dye [Gadolinium Derivatives]        Medications:     Current Facility-Administered Medications:     acetaminophen (Tylenol Extra Strength) tab 500 mg, 500 mg, Oral, Q4H PRN    polyethylene glycol (PEG 3350) (Miralax) 17 g oral packet 17 g, 17 g, Oral, Daily PRN    sennosides (Senokot) tab 17.2 mg, 17.2 mg, Oral, Nightly PRN    bisacodyl (Dulcolax) 10 MG rectal suppository 10 mg, 10 mg, Rectal, Daily PRN    fleet enema (Fleet) 7-19 GM/118ML rectal enema 133 mL, 1 enema, Rectal, Once PRN    ondansetron (Zofran) 4 MG/2ML injection 4 mg, 4 mg, Intravenous, Q6H PRN    metoclopramide (Reglan) 5 mg/mL injection 5 mg, 5 mg, Intravenous, Q8H PRN    terazosin (Hytrin) cap 10 mg, 10 mg, Oral, Nightly    [Held by provider] apixaban (Eliquis) tab 2.5 mg, 2.5 mg, Oral, BID    furosemide (Lasix) tab 40 mg, 40 mg, Oral, Daily    montelukast (Singulair) tab 10 mg, 10 mg, Oral, QPM  No current outpatient medications on file.       Functional Status History:  ADLs: Independent - uses walker  (Bathing or showering, dressing, getting in and out of bed or chair, walking, using the toilet and eating)  IADLs: Independent  (Use the phone, shop for groceries, meal preparation, manage medicines, clean living area, use transportation by self, manage money)  Falls: Yes  PTA  PT/OT recommendations:  Home w  therapy    Palliative Care Social History:   Marital Status:   Children: Yes  Living Situation Prior to Admit: Home  Does Patient Live Alone: No lives with daughter Margie  Is Patient Confused: Yes  Occupational History:  deferred      Social History     Socioeconomic History    Marital status:     Number of children: 3   Occupational History    Occupation: retired     Tobacco Use    Smoking status: Former     Types: Pipe     Quit date: 1964     Years since quittin.3    Smokeless tobacco: Never   Vaping Use    Vaping status: Never Used   Substance and Sexual Activity    Alcohol use: No    Drug use: No    Sexual activity: Not Currently     Partners: Female       Substance History:   reports that he quit smoking about 60 years ago. His smoking use included pipe. He has never used smokeless tobacco.  reports no history of alcohol use.  reports no history of drug use.      Spiritual Assessment:   Wishes Privacy;  deferred    HPI obtained from [facility] and Micheal's family.    SUBJECTIVE  Review of Systems - Palliative Care Symptom Assessment       Micheal is fatigued, confused. Just worked with therapy.     OBJECTIVE       Hematology:   Lab Results   Component Value Date    WBC 3.2 (L) 2024    HGB 11.6 (L) 2024    HCT 36.7 (L) 2024    .0 (L) 2024       Chemistry:  Lab Results   Component Value Date    CREATSERUM 1.04 2024    BUN 36 (H) 2024     2024    K 4.0 2024     2024    CO2 33.0 (H) 2024    GLU 89 2024    CA 8.3 (L) 2024    ALB 2.9 (L) 2024    ALKPHO 98 2024    BILT 0.7 2024    TP 6.3 (L) 2024    AST 18 2024    ALT 24 2024    PSA 0.08 2023    DDIMER 0.31 2022    MG 2.1 2024    TROP <0.046 2016       Imaging:  CT BRAIN OR HEAD (81710)    Result Date: 2024  CONCLUSION:  No acute intracranial process.     LOCATION:  [facility]   Dictated by (CST): Ross Gray MD on 4/17/2024 at 9:11 PM     Finalized by (CST): Ross Gray MD on 4/17/2024 at 9:12 PM       XR CHEST AP PORTABLE  (CPT=71045)    Result Date: 4/17/2024  CONCLUSION:  1. Cardiomegaly with mild interstitial opacities likely representing mild edema. 2. Small left-sided pleural effusion with left basilar atelectasis/infiltrates.    LOCATION:  [facility]      Dictated by (CST): Camelia Figueroa MD on 4/17/2024 at 8:55 AM     Finalized by (CST): Camelia Figueroa MD on 4/17/2024 at 8:55 AM        Vital Signs:  Blood pressure 112/60, pulse 91, temperature 98 °F (36.7 °C), temperature source Axillary, resp. rate 23, height 5' 2\" (1.575 m), weight 136 lb 1.6 oz (61.7 kg), SpO2 97%.      Physical Exam:     GENERAL: Sleepy, intermittently participating. Appears frail. NAD.  BODY HABITUS:  Body mass index is 24.89 kg/m².  HEENT: No focal deficits.   CARDIAC: HR 90s per tele  RESPIRATORY: no increased effort at rest on room air.  NEUROLOGIC: alert, oriented to self and place as hospital - states year is 1940, unable to state reason for hospital stay or any PMH. RASHEED.  PSYCHIATRIC:  pleasant, confused..    Pre-hospital Palliative Performance Scale: (pt/family reported/per chart review): 50%  Current Palliative Performance Scale:  50%    Palliative Performance Scale   % Ambulation Activity Level Self-Care Intake Consciousness   100 Full Normal Full   Normal Full   90 Full No disease  Normal Full Normal Full   80 Full Some disease  Normal w/effort Full Normal or  Reduced Full   70 Reduced Some disease  Can't perform job Full Normal or   Reduced Full   60 Reduced Significant disease  Can't perform hobby Occasional  Assist Normal or   Reduced Full or confused   50 Mainly sit/lie Extensive Disease  Can't do any work Partial Assist Normal or Reduced Full or confused   40 Mainly in bed Extensive Disease  Can't do any work Mainly Assist Normal or Reduced Full or confused   30 Bedbound  Extensive Disease  Can't do any work Max Assist  Total Care Reduced Drowsy/confused   20 Bedbound Extensive Disease  Can't do any work Max Assist  Total Care Minimal Drowsy/confused   10 Bedbound/coma Extensive Disease  Can't do any work  coma  Max Assist  Total Care Mouth care Drowsy or coma   0 Death     Palliative Care Assessment       Goals of Care:      Provided introduction to Palliative Care and reason for consultation to Micheal and his family - daughter Adamaris at bedside, daughter (MALACHI Hanson by video call, and his granddaughter, her  and her son at bedside. Discussion included the benefits of palliative care to provide extra layer of support to patient/family who wish to continue curative or restorative medical therapies. Palliative care was differentiated from hospice philosophy and model of care by reviewing the treatment-focus with palliative care, versus comfort-focused care with hospice. I also informed that having palliative care support does not limit medical treatment options or decisions.       Diagnostic understanding and Prognostic Awareness:  Micheal is confused and unable to speak to his past medical history or reason for hospital stay. Being at home and not being a burden to his family are most important to Micheal. He does not want measures taken to prolong his life artificially.     Micheal had initially declined to have PPM because he felt it would cause him to be a burden to his family. He is unable to tell me what it's for, how it's placed, or any implications. His family understands the indications, but unclear whether he is a candidate, and need further education on process for placement, etc.    His family has good understanding of his medical conditions as well as his wishes.   Family favors having PPM placed if it means it prevents episodes of bradycardia, falls, etc that may result in him being hospitalized, injured, or EOL that could be prevented. Again, will need input from  EP re candidacy, and if candidate, recommend education on risks vs benefits as well as information on recovery and what it could add to his length/QOL. Family supports importance of his return home and having as much comfort as possible.    Margie requested support with DME (hospital bed, commode, etc) as they moved him into the main living area recently. Will request SW support w this.    Margie also asked about having an  informational meeting with hospice agency during this hospital stay. They know of someone who had good experience with [facility], and request to hear from them. As family is inquiring about PC at home as well, we discussed availability of PC through [facility]. They will meet with [facility] for information as they consider the overall direction of care.    Await EP input as well.    Will f/u tomorrow to continue supporting.    Advance Care Planning:    Code Status:  DNAR/Selective Treatment.  A voluntary discussion surrounding resuscitation and LST took place with Micheal and his family, including daughter (NIMA) Margie. All support DNAR, DNI. Order placed, POLST drafted and at bedside for daughter's review and signature. Will follow up to complete / scan.    HCPOA:  document on file.  Healthcare Agent Appointed: Yes  Healthcare Agent's Name: Margie Stallings  Healthcare Agent's Phone Number: [phone]        Problem List:       Principal Problem:    Syncope and collapse  Active Problems:    Bradycardia    Palliative care encounter    Counseling regarding advance care planning and goals of care      Palliative Care Recommendations / Plan:       Goals of Care:   Pt and family request to hear from EP re eval for possible PPM tomorrow. Further decisions to follow.  Consult to  for support with DME at home + family request for ([facility]) hospice meeting for information.   If treatment remains the focus of care, family intends to add PC at [place]. Will formally refer if that's the case.  Will f/u to  support with outcomes of above tomorrow.      Code Status: DNAR/Selective Treatment.  Order placed per limit set by pt and family today, POLST form drafted and at bedside awaiting signature. Will f/u to finalize and scan.    HCPOA: DaughterMargie     Psychosocial:  Emotional support provided.    Disposition:  pending clinical course      A total of 75 minutes were spent on this consult, which included all of the following:  Chart review, direct face to face contact, history taking, physical examination, counseling and coordinating care, and documentation.     I reviewed the above with patient's RN, SAMY, primary.    Thank you for inviting Palliative Care Service to participate in the care of Micheal Nickerson and family.       Will follow.      KERVIN Otero  Palliative Care    4/18/2024  2:03 PM      The 21st Century Cures Act makes medical notes like these available to patients in the interest of transparency. Please be advised this is a medical document. Medical documents are intended to carry relevant information, facts as evident, and the clinical opinion of the practitioner. The medical note is intended as a peer to peer communication, and may appear blunt or direct. It is written in medical language and may contain abbreviations or verbiage that are unfamiliar.

## 2024-04-18 NOTE — PLAN OF CARE
Received patient at 1500. Alert and Oriented x2-3. Tele Rhythm Afib with occasional PVC. Has hx pausing during sleep.  O2 saturation 96% On room air. Breath sounds clear. Bed is locked and in low position. Call light and personal items within reach.  Pt used urinal in ED, states he has urgency, primofit placed. Multiple skin . Daughter at bedside.  Reviewed plan of care and patient and daughter verbalize understanding,pt will need reinforcement.       Problem: Patient/Family Goals  Goal: Patient/Family Long Term Goal  Description: Patient's Long Term Goal: to go home    Interventions:  - tele monitoring, labs, EP consultation, increase activity, skin care    - See additional Care Plan goals for specific interventions  Outcome: Progressing  Goal: Patient/Family Short Term Goal  Description: Patient's Short Term Goal: feel better    Interventions:   - - tele monitoring, labs, EP consultation, increase activity, skin care    - See additional Care Plan goals for specific interventions  Outcome: Progressing     Problem: CARDIOVASCULAR - ADULT  Goal: Maintains optimal cardiac output and hemodynamic stability  Description: INTERVENTIONS:  - Monitor vital signs, rhythm, and trends  - Monitor for bleeding, hypotension and signs of decreased cardiac output  - Evaluate effectiveness of vasoactive medications to optimize hemodynamic stability  - Monitor arterial and/or venous puncture sites for bleeding and/or hematoma  - Assess quality of pulses, skin color and temperature  - Assess for signs of decreased coronary artery perfusion - ex. Angina  - Evaluate fluid balance, assess for edema, trend weights  Outcome: Progressing  Goal: Absence of cardiac arrhythmias or at baseline  Description: INTERVENTIONS:  - Continuous cardiac monitoring, monitor vital signs, obtain 12 lead EKG if indicated  - Evaluate effectiveness of antiarrhythmic and heart rate control medications as ordered  - Initiate emergency measures for life  threatening arrhythmias  - Monitor electrolytes and administer replacement therapy as ordered  Outcome: Progressing     Problem: SKIN/TISSUE INTEGRITY - ADULT  Goal: Incision(s), wounds(s) or drain site(s) healing without S/S of infection  Description: INTERVENTIONS:  - Assess and document risk factors for pressure ulcer development  - Assess and document skin integrity  - Assess and document dressing/incision, wound bed, drain sites and surrounding tissue  - Implement wound care per orders  - Initiate isolation precautions as appropriate  - Initiate Pressure Ulcer prevention bundle as indicated  Outcome: Progressing     Problem: Impaired Functional Mobility  Goal: Achieve highest/safest level of mobility/gait  Description: Interventions:  - Assess patient's functional ability and stability  - Promote increasing activity/tolerance for mobility and gait  - Educate and engage patient/family in tolerated activity level and precautions    Outcome: Progressing

## 2024-04-19 LAB
ANION GAP SERPL CALC-SCNC: 5 MMOL/L (ref 0–18)
BASOPHILS # BLD AUTO: 0.02 X10(3) UL (ref 0–0.2)
BASOPHILS NFR BLD AUTO: 0.7 %
BUN BLD-MCNC: 42 MG/DL (ref 9–23)
CALCIUM BLD-MCNC: 8.7 MG/DL (ref 8.5–10.1)
CHLORIDE SERPL-SCNC: 109 MMOL/L (ref 98–112)
CO2 SERPL-SCNC: 30 MMOL/L (ref 21–32)
CREAT BLD-MCNC: 0.95 MG/DL
EGFRCR SERPLBLD CKD-EPI 2021: 75 ML/MIN/1.73M2 (ref 60–?)
EOSINOPHIL # BLD AUTO: 0.07 X10(3) UL (ref 0–0.7)
EOSINOPHIL NFR BLD AUTO: 2.3 %
ERYTHROCYTE [DISTWIDTH] IN BLOOD BY AUTOMATED COUNT: 15.1 %
GLUCOSE BLD-MCNC: 98 MG/DL (ref 70–99)
HCT VFR BLD AUTO: 36.6 %
HGB BLD-MCNC: 12 G/DL
IMM GRANULOCYTES # BLD AUTO: 0.01 X10(3) UL (ref 0–1)
IMM GRANULOCYTES NFR BLD: 0.3 %
LYMPHOCYTES # BLD AUTO: 0.8 X10(3) UL (ref 1–4)
LYMPHOCYTES NFR BLD AUTO: 26.6 %
MCH RBC QN AUTO: 32 PG (ref 26–34)
MCHC RBC AUTO-ENTMCNC: 32.8 G/DL (ref 31–37)
MCV RBC AUTO: 97.6 FL
MONOCYTES # BLD AUTO: 0.44 X10(3) UL (ref 0.1–1)
MONOCYTES NFR BLD AUTO: 14.6 %
NEUTROPHILS # BLD AUTO: 1.67 X10 (3) UL (ref 1.5–7.7)
NEUTROPHILS # BLD AUTO: 1.67 X10(3) UL (ref 1.5–7.7)
NEUTROPHILS NFR BLD AUTO: 55.5 %
OSMOLALITY SERPL CALC.SUM OF ELEC: 308 MOSM/KG (ref 275–295)
PLATELET # BLD AUTO: 129 10(3)UL (ref 150–450)
PLATELETS.RETICULATED NFR BLD AUTO: 3.1 % (ref 0–7)
POTASSIUM SERPL-SCNC: 4.1 MMOL/L (ref 3.5–5.1)
RBC # BLD AUTO: 3.75 X10(6)UL
SODIUM SERPL-SCNC: 144 MMOL/L (ref 136–145)
WBC # BLD AUTO: 3 X10(3) UL (ref 4–11)

## 2024-04-19 PROCEDURE — 99233 SBSQ HOSP IP/OBS HIGH 50: CPT | Performed by: NURSE PRACTITIONER

## 2024-04-19 RX ORDER — SALIVA STIMULANT COMB. NO.3
SPRAY, NON-AEROSOL (ML) MUCOUS MEMBRANE AS NEEDED
Status: DISCONTINUED | OUTPATIENT
Start: 2024-04-19 | End: 2024-04-21

## 2024-04-19 RX ORDER — MIDODRINE HYDROCHLORIDE 5 MG/1
5 TABLET ORAL
Status: DISCONTINUED | OUTPATIENT
Start: 2024-04-19 | End: 2024-04-21

## 2024-04-19 NOTE — PLAN OF CARE
Assumed patient care at 1930. Patient alert and oriented x 2-3. Maintaining O2 saturation WNL on room air during the day needs 2L/min at night. Afib on tele monitor. Pt had 4 pauses overnight 3.04, 3.39, 3.23 and 3.9. Patient incontinent of bladder and bowel, external catheter in place. Safety precautions in place, call light within reach, bed alarm on, daughter at the bedside overnight. All needs met at this time.       Problem: Patient/Family Goals  Goal: Patient/Family Long Term Goal  Description: Patient's Long Term Goal: to go home    Interventions:  - tele monitoring, labs, EP consultation, increase activity, skin care    - See additional Care Plan goals for specific interventions  Outcome: Progressing  Goal: Patient/Family Short Term Goal  Description: Patient's Short Term Goal: feel better    Interventions:   - - tele monitoring, labs, EP consultation, increase activity, skin care    - See additional Care Plan goals for specific interventions  Outcome: Progressing     Problem: CARDIOVASCULAR - ADULT  Goal: Maintains optimal cardiac output and hemodynamic stability  Description: INTERVENTIONS:  - Monitor vital signs, rhythm, and trends  - Monitor for bleeding, hypotension and signs of decreased cardiac output  - Evaluate effectiveness of vasoactive medications to optimize hemodynamic stability  - Monitor arterial and/or venous puncture sites for bleeding and/or hematoma  - Assess quality of pulses, skin color and temperature  - Assess for signs of decreased coronary artery perfusion - ex. Angina  - Evaluate fluid balance, assess for edema, trend weights  Outcome: Progressing  Goal: Absence of cardiac arrhythmias or at baseline  Description: INTERVENTIONS:  - Continuous cardiac monitoring, monitor vital signs, obtain 12 lead EKG if indicated  - Evaluate effectiveness of antiarrhythmic and heart rate control medications as ordered  - Initiate emergency measures for life threatening arrhythmias  - Monitor  electrolytes and administer replacement therapy as ordered  Outcome: Progressing     Problem: SKIN/TISSUE INTEGRITY - ADULT  Goal: Incision(s), wounds(s) or drain site(s) healing without S/S of infection  Description: INTERVENTIONS:  - Assess and document risk factors for pressure ulcer development  - Assess and document skin integrity  - Assess and document dressing/incision, wound bed, drain sites and surrounding tissue  - Implement wound care per orders  - Initiate isolation precautions as appropriate  - Initiate Pressure Ulcer prevention bundle as indicated  Outcome: Progressing

## 2024-04-19 NOTE — CONSULTS
[facility] Electrophysiology Consult      Micheal Nickerson Patient Status:  Inpatient    1931 MRN [id number]   [facility] 2NE-A Attending Peter Mark MD   Hosp Day # 2 PCP Thuan Moody MD       Micheal Nickerson is a 89 year old year old male    He has a h/o permanent atrial fibrillation, HFpEF, hypertension, CAD    Daughter in rooma nd on phone.  He was admitted with syncope. He had just stood up, didn't feel well for a minute or 2 and called out to his family, then eased to the ground with LOC. Doesn't recall event.    Tele here: AF with controlled rates, some pauses up to 4 seconds while asleep    No previous syncope    Was just here last week with acute on chronic HFpEF    Denies chest pain, shortness of breath, palpitations, lightheadedness, orthopnea, paroxysmal nocturnal dyspnea, or edema.    ROS: All other systems were reviewed and were negative.    Past Medical History:  Past Medical History:    ALLERGIC RHINITIS    Arrhythmia    a-fib    Back problem    CANCER    prostate, GL 8 (4+4) L base, GL 6 R base    Chronic rhinitis    Clostridioides difficile infection    treated, no longer symptomatic    Diverticulosis    Diverticulosis    Exposure to medical diagnostic radiation        Heart attack (HCC)    daughter denies ever having heart attack    Hematuria  PAINLESS    CYCTOSCOPY    IVP    High blood pressure    History of COVID-19    hospitalized x 4 days. cough/sob. 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    Unspecified essential hypertension    Visual impairment    glasses       Allergies:  Allergies   Allergen Reactions    Contrast Dye [Gadolinium Derivatives]        Outpatient Medications:  Current  Facility-Administered Medications on File Prior to Encounter   Medication Dose Route Frequency Provider Last Rate Last Admin    [COMPLETED] potassium chloride (K-Dur) tab 40 mEq  40 mEq Oral Once Helder Machado DO   40 mEq at 04/15/24 0825    [COMPLETED] potassium chloride (Klor-Con) 20 MEQ oral powder 40 mEq  40 mEq Oral Once Avery Khan MD   40 mEq at 04/14/24 0819    [COMPLETED] furosemide (Lasix) 10 mg/mL injection 40 mg  40 mg Intravenous Once Jacob Hobbs DO   40 mg at 04/12/24 1853    [COMPLETED] nystatin (Mycostatin) 100,000 Units/g ointment   Topical Once Jacob Hobbs DO   Given at 04/12/24 1956     Current Outpatient Medications on File Prior to Encounter   Medication Sig Dispense Refill    DOXAZOSIN 8 MG Oral Tab TAKE 1 TABLET BY MOUTH DAILY 30 MINUTES AFTER DINNER 90 tablet 1    montelukast 10 MG Oral Tab Take 1 tablet (10 mg total) by mouth every evening. 90 tablet 3    ELIQUIS 2.5 MG Oral Tab Take 1 tablet (2.5 mg total) by mouth 2 (two) times a day. 180 tablet 3    furosemide 40 MG Oral Tab Take 1 tablet (40 mg total) by mouth daily. 30 tablet 0        Scheduled Meds:   terazosin  10 mg Oral Nightly    [Held by provider] apixaban  2.5 mg Oral BID    furosemide  40 mg Oral Daily    montelukast  10 mg Oral QPM       Infusion Meds:      Social:   reports that he quit smoking about 60 years ago. His smoking use included pipe. He has never used smokeless tobacco. He reports that he does not drink alcohol and does not use drugs.    Family History:  family history includes Cancer in his father; Heart Attack in his brother.    Physical:  /83 (BP Location: Left arm)   Pulse 79   Temp 97.3 °F (36.3 °C) (Oral)   Resp 16   Ht 62\"   Wt 130 lb 1.1 oz (59 kg)   SpO2 91%   BMI 23.79 kg/m²      Intake/Output Summary (Last 24 hours) at 4/19/2024 0749  Last data filed at 4/19/2024 0500  Gross per 24 hour   Intake 240 ml   Output 1100 ml   Net -860 ml     Wt Readings from Last 4  Encounters:   04/19/24 130 lb 1.1 oz (59 kg)   04/16/24 133 lb 2.5 oz (60.4 kg)   04/20/23 152 lb (68.9 kg)   04/16/23 152 lb (68.9 kg)     GENERAL: sleeping arousable  EYES: sclera anicteric  HEENT: normocephalic  NECK: no JVD, no carotid bruits, no thyromegaly  RESPIRATORY: clear to auscultation  CARDIOVASCULAR: S1, S2 normal, IRRR; no S3, no S4; no click; no murmur  ABDOMEN: normal active BS, soft, nondistended; nontender  EXTREMITIES: no cyanosis, clubbing or edema, peripheral pulses intact  NEURO: no sensorimotor deficits  PSYCHIATRIC: alert and oriented x 3, affect normal  SKIN: no rashes    Data:  ECG: AF 76  Tele: AF as above  Echo: EF normal    Labs:  Recent Labs   Lab 04/15/24  0422 04/16/24  0543 04/17/24  0816 04/18/24  0637 04/19/24  0556   WBC 3.0* 3.5* 3.6* 3.2* 3.0*   HGB 12.2* 12.5* 12.4* 11.6* 12.0*   .0* 137.0* 142.0* 119.0* 129.0*       Recent Labs   Lab 04/15/24  0422 04/16/24  0543 04/17/24  0816 04/18/24  0637 04/19/24  0556    146* 143 144 144   K 3.8  3.8 4.4  4.4 4.1 4.0 4.1    105 103 108 109   CO2 37.0* 36.0* 34.0* 33.0* 30.0   BUN 28* 31* 33* 36* 42*   CREATSERUM 1.06 1.17 1.12 1.04 0.95   CA 8.2* 8.5 8.7 8.3* 8.7   * 92 93 89 98       No results for input(s): \"INR\" in the last 168 hours.    No results for input(s): \"TROP\" in the last 168 hours.    TSH   Date Value Ref Range Status   04/11/2024 3.220 0.358 - 3.740 mIU/mL Final     Comment:     This test may exhibit interference when a sample is collected from a person who is consuming high dose of biotin (a.k.a., vitamin B7, vitamin H, coenzyme R) supplements resulting in serum concentrations >100 ng/mL.  Intake of the recommended daily allowance (RDA) for biotin (0.03 mg) has not been shown to typically cause significant interference; however, high dose daily dietary supplements may contain biotin concentrations greater than 150 times (5-10 mg) the RDA.  It is recommended that physicians ask all patients who  may be on biotin supplementation to stop biotin consumption at least 72 hours prior to collection of a new sample.         Impression:  syncope  Permanent AF  HFpEF    Plan:  Etiology of syncope is unknown and unclear if a pacemaker would be helpful. Orthostatic hypotesnsion most likely cause. Long discussion with family about this. Questions answered.      Everardo Juarez MD  Cardiology / Clinical Cardiac Electrophysiology  [facility]

## 2024-04-19 NOTE — PAYOR COMM NOTE
--------------  ADMISSION REVIEW   4/17-4/19  Payor: [employer] MED[employer] ADV PPO  Subscriber #:  [id number]  Authorization Number: [id number]    Admit date: 4/17/24  Admit time: 11:47 AM       REVIEW DOCUMENTATION:     ED Provider Notes        ED Provider Notes signed by Aquilino Cain DO at 4/17/2024 10:00 AM       Author: Aquilino Cain DO Service: -- Author Type: Physician    Filed: 4/17/2024 10:00 AM Date of Service: 4/17/2024  8:26 AM Status: Signed    : Aquilino Cain DO (Physician)           Patient Seen in: [facility] Emergency Department      History     Chief Complaint   Patient presents with    Syncope     Stated Complaint:     Subjective:   HPI    This is a 89-year-old male with history of atrial fibrillation, hypertension, coronary disease presents emergency room for evaluation of syncopal episode.  Patient was standing in kitchen with family, family reports he mentioned he was not feeling well, the eased him to the ground he then reportedly passed out for \"a few minutes \".  No seizure activity was noted, when patient regained conscious he was back to his baseline mental status.  Patient does not recall the event, denies headache or neck pain denies chest pain or shortness of breath denies abdominal pain.  Patient was discharged yesterday from the hospital, was admitted for anasarca/pleural effusion/HFpEF acute on chronic, had echo at that time with EF 55-60%.  Patient was noted to have pauses up to 4 seconds on the monitor, pacemaker was discussed by cardiology and family declined.    Objective:   Past Medical History:    ALLERGIC RHINITIS    Arrhythmia    a-fib    Back problem    CANCER    prostate, GL 8 (4+4) L base, GL 6 R base    Chronic rhinitis    Clostridioides difficile infection    treated, no longer symptomatic    Diverticulosis    Diverticulosis    Exposure to medical diagnostic radiation    2011    Heart attack (HCC)    daughter denies ever having heart attack    Hematuria  PAINLESS     CYCTOSCOPY 1/93   IVP    High blood pressure    History of COVID-19    hospitalized x 4 days. cough/sob. 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  Constitutional and vital signs reviewed.      All other systems reviewed and negative except as noted above.    Physical Exam     ED Triage Vitals   BP 04/17/24 0806 100/59   Pulse 04/17/24 0806 73   Resp 04/17/24 0806 14   Temp 04/17/24 0907 97.5 °F (36.4 °C)   Temp src 04/17/24 0907 Oral   SpO2 04/17/24 0830 94 %   O2 Device 04/17/24 0806 None (Room air)       Current:/61   Pulse 88   Temp 97.5 °F (36.4 °C) (Oral)   Resp 15   Ht 157.5 cm (5' 2\")   Wt 54.4 kg   SpO2 100%   BMI 21.95 kg/m²       GENERAL: Patient is awake, alert,  in no acute distress.  HEENT: s no scleral icterus.  Mucous membranes are moist.Scalp is atraumatic.  NECK: Neck is supple, there is no nuchal rigidity.  No cervical spine tenderness.    HEART: Irregular regular   LUNGS: Clear to auscultation bilaterally.  No Rales, no rhonchi, no wheezing, no stridor.  ABDOMEN: Soft, nondistended,non tender  EXTREMITIES: No tenderness to the bilateral upper or lower extremities.  Pelvis stable no tenderness bilateral hips   NEUROLOGIC EXAM: Tongue midline, no facial drooping, no ptosis, muscle strength +5/5 bilateral upper and lower extremities   ED Course     Labs Reviewed   COMP METABOLIC PANEL (14) - Abnormal; Notable for the following components:       Result Value    CO2 34.0 (*)     BUN 33 (*)     Calculated Osmolality 303 (*)     Total Protein 6.3 (*)     Albumin 2.9 (*)     A/G Ratio 0.9 (*)     All other  components within normal limits   POCT GLUCOSE - Abnormal; Notable for the following components:    POC Glucose 101 (*)     All other components within normal limits   CBC W/ DIFFERENTIAL - Abnormal; Notable for the following components:    WBC 3.6 (*)     HGB 12.4 (*)     .0 (*)     .0 (*)     Lymphocyte Absolute 0.87 (*)     All other components within normal limits   TROPONIN I HIGH SENSITIVITY - Normal   MAGNESIUM - Normal   CBC WITH DIFFERENTIAL WITH PLATELET    Narrative:     The following orders were created for panel order CBC With Differential With Platelet.  Procedure                               Abnormality         Status                     ---------                               -----------         ------                     CBC W/ DIFFERENTIAL[[id number]]          Abnormal            Final result                 Please view results for these tests on the individual orders.   RAINBOW DRAW LAVENDER   RAINBOW DRAW LIGHT GREEN   RAINBOW DRAW BLUE   RAINBOW DRAW GOLD     EKG    Rate, intervals and axes as noted on EKG Report.  Rate: 76  Rhythm: Atrial Fibrillation  Reading: Atrial fibrillation, no ST elevation.  Differential diagnosis before testing includes but not limited to dysrhythmia/arrhythmia, orthostatic/vasovagal episode, electrolyte abnormality, which is a medical condition that poses a threat to life/function    Past Medical History/comorbidities-as in HPI      Radiographic images  I personally reviewed the radiographs and my individual interpretation shows chest x-ray no pneumothorax  I also reviewed the official reports that showed cardiomegaly with moderate tissue opacities likely representing mild edema.  Small left-sided pleural effusion.    History obtained by external source  (EMS, family, law enforcement, )other sources of medical information included history per son-in-law and daughter at bedside as in HPI    Discussion of management (consult/physicians, social work,  pharmacy,ect) krista cardiology and hospitalist    External chart review included recent hospital medical records as in HPI      Course of Events during Emergency Room Visit include patient placed on cardiac monitor and pulse ox, chest x-ray performed.  CBC white count 3.6 hemoglobin 12.4 platelet 142.  Magnesium 2.1.  Troponin 19.  Chemistry sodium 143 potassium 4.1 BUN 33 creatinine 1.12 glucose 93.  Patient has not had any pauses on monitor throughout ER visit, patient has had episodes of bradycardia down to 40s but blood pressure has remained stable.  Discussed with krista cardiology, family is now willing to have pacemaker placed, will admit for further evaluation and treatment.  Discussed with krista hospitalist.    Shared decision making was utilized       Disposition:    Admission  I have discussed with the patient the results of test, differential diagnosis, and treatment plan. They expressed clear understanding of these instructions and agrees to the plan provided.     Note to patient: The 21st Century Cures Act makes medical notes like these available to patients in the interest of transparency. However, this is a medical document intended as peer to peer communication. It is written in medical language and may contain abbreviations or verbiage that are unfamiliar. It may appear blunt or direct. Medical documents are intended to carry relevant information, facts as evident, and the clinical opinion of the practitioner.         Admission disposition: 4/17/2024  9:56 AM    Disposition and Plan     Clinical Impression:  1. Syncope and collapse    2. Bradycardia         Disposition:  Admit  4/17/2024  9:56 am    Hospital Problems       Present on Admission  Date Reviewed: 4/6/2024            ICD-10-CM Noted POA    * (Principal) Syncope and collapse R55 4/17/2024 Unknown               4/17 H&P  ASSESSMENT / PLAN:   89 year old male from home with a PMHx sig for afib, CAD,  HFpEF, prostate CA,  h/o cdiff,  diverticulosis and HTN presents to the hospital with syncope and collapse.     Syncope and collapse  -passed out while in kitchen with family, no injury reported as he was eased to the ground  -ECG w controlled rate AFIB   -troponin normal  -cardiology consulted, plan for EP consult, likely won't see the patient until Friday, no urgent need for PPM at this time, monitor on tele for pauses  -ECHO from 4/11/24, EF 55-60     Bradycardia  Pauses  -monitor on tele for pauses  -family was not interested in PPM last hospitalization, given new syncopal event they are now willing to consider  -EP to evaluate     Afib  -rate controlled  -eliquis,on hold starting tomorrow per cardiology, will give one dose tonight     HFpEF  -no signs of vol overload upon physical exam  -lasix     BPH  -terazosin     Voluntary GOC discussion with patient, POMAINE/daughter Kathie confirms they have not made this decision yet.  They wanted him to decide on the last hospitalization but he was too confused.  They would like to discuss this as a family and are ok with a palliative care consult to help guide them with GOC.     MA/ACO Reach  -Re- Entry: yes  -Consults: cards, EP  -Discharge Needs: TBD  -Appointments: PCP       FERMIN crowder in am  -diet-cardiac     Prophy  -SCD     Dispo  -pending clinical course    History of Present Illness: 89 year old male with a PMHx sig for afib, CAD, HFpEF, prostate CA,  h/o cdiff, diverticulosis and HTN presents to the hospital with syncope and collapse.  He was just discharged from the hospital and was experiencing bradycardia and <4 sec pauses on telemetry during that hospitalization.  The family was not interested in PPM insertion at this time.  He has a syncopal event at home while with family and was slowly lowered to the ground with no injury.  He woke up after a few seconds and returned to baseline.  He was brought back to [facility] for evaluation.  Cardiology was consulted and rec an EP consult who won't be  able to see the patient until Friday.  Monitor on telemetry until EP can see.         Review of Systems  12 point systems reviewed, please see HPI for pertinent positives, otherwise negative     OBJECTIVE:  /79 (BP Location: Right arm)   Pulse 71   Temp (!) 96.3 °F (35.7 °C) (Axillary)   Resp 12   Ht 5' 2\" (1.575 m)   Wt 136 lb 1.6 oz (61.7 kg)   SpO2 96%   BMI 24.89 kg/m²      GENERAL: no apparent distress  NEUROLOGIC: A/A; Ox2-3: strength normal; sensations intact  RESPIRATORY: normal expansion; non labored, CTA   CARDIOVASCULAR: afib  ABDOMEN:  Soft, BS+; non distended, non tender    EXTREMITIES: no LE edema    S: Seen and examined at bedside.  Patient laying in bed comfortably.  Answered all questions.  Family at bedside.     objective  BP 97/61 (BP Location: Right arm)   Pulse 65   Temp 96.8 °F (36 °C) (Axillary)   Resp 15   Ht 5' 2\" (1.575 m)   Wt 136 lb 1.6 oz (61.7 kg)   SpO2 98%   BMI 24.89 kg/m²      Gen: No acute distress, alert and oriented x to-3  Neck Supple, no JVD  Pulm: Lungs clear, normal respiratory effort, No wheezing or crackles  CV: Heart with regular rate and rhythm, No murmurs, rubs, gallops  Abd: Abdomen soft, nontender, nondistended, no organomegaly, bowel sounds present  MSK:  no clubbing, no cyanosis.  No Lower extremity edema  Skin: no rashes or lesions, well perfused  Psych: mood stable, cooperative  Neuro: no focal deficits, alert oriented x 2-3 (at baseline), 5 out of 5 strength upper and lower extremities, able to answer questions appropriately and follow commands        Assessment and Plan     89 year old male from home with a PMHx sig for afib, CAD,  HFpEF, prostate CA,  h/o cdiff, diverticulosis and HTN presents to the hospital with syncope and collapse.     Syncope and collapse  Rule out spontaneous hemorrhagic bleed  Bradycardia  Pauses  Afib  HFpEF  BPH        - Telemetry  - Cardiology evaluation  - Etiology of syncope is unclear, patient has been off of  elda blocking agents, heart rate will have to be extremely low with pauses in order to have a profound syncopal episode  - Will obtain CT head in the setting of Eliquis use  - Will hold Eliquis for now  - Discussed with family.  Answered all questions.          4/17 Cardiology  Reason for consultation:  syncope     Impression:  Recent admission for HfPEF exacerbation  Syncope: recent episodes of pauses up to 4 seconds. Fmaily did not want pacemaker at that time  Paroxysmal AFIB  HTN  CAD     Plan:  Not on an AV elda blockers  Continue to monitor on TELE  Will obtain EP consult. Unlikely to see patient prior to Friday. Will allow us to observe for the next 48 hours to see if he has any significant pauses. He has only had pauses <5 seconds on last admission which were nocturnal. No urgent indication at this time for PPM or temporary wire   Hold eliquis starting tomorrow  Contine PO lasix   Recent echo with normal LVEF          4/18  89 year old male from home with a PMHx sig for afib, CAD,  HFpEF, prostate CA,  h/o cdiff, diverticulosis and HTN presents to the hospital with syncope and collapse.     Syncope and collapse  -passed out while in kitchen with family, no injury reported as he was eased to the ground  -ECG w controlled rate AFIB   -troponin normal  -cardiology consulted, plan for EP consult, likely won't see the patient until Friday, no urgent need for PPM at this time, monitor on tele for pauses  -ECHO from 4/11/24, EF 55-60  -Etiology of syncope is unclear, patient has been off of elda blocking agents, heart rate will have to be extremely low with pauses in order to have a profound syncopal episode   -Will obtain CT head in the setting of Eliquis use >> neg for acute process      Bradycardia  Pauses  -monitor on tele for pauses, still having overnight bradycardia episodes  -family was not interested in PPM last hospitalization, given new syncopal event they are now willing to consider  -EP to evaluate      Afib  -rate controlled  -eliquis,on hold      HFpEF  -no signs of vol overload upon physical exam  -lasix     BPH  -terazosin     Voluntary GOC discussion with patient, POA/daughter Kathie confirms they have not made this decision yet.  They wanted him to decide on the last hospitalization but he was too confused.  They would like to discuss this as a family and are ok with a palliative care consult to help guide them with GOC.     MA/ACO Reach  -Re- Entry: yes  -Consults: cards, EP  -Discharge Needs: TBD  -Appointments: PCP       FERMIN crowder in am  -diet-cardiac     Prophy  -SCD     Dispo  -pending clinical course    Blood pressure 91/66, pulse 70, temperature 97.9 °F (36.6 °C), temperature source Axillary, resp. rate 13, height 5' 2\" (1.575 m), weight 136 lb 1.6 oz (61.7 kg), SpO2 98%.     GENERAL: no apparent distress  NEURO: A/A Ox2-3  RESP: non labored, CTA, on 02, sat 97  CARDIO: irregular  ABD: soft, NT, ND, BS+  EXTREMITIES: no edema, no calf tenderness     DIAGNOSTIC DATA:   Labs:              Recent Labs   Lab 04/14/24  0459 04/15/24  0422 04/16/24  0543 04/17/24  0816 04/18/24  0637   WBC 3.1* 3.0* 3.5* 3.6* 3.2*   HGB 12.0* 12.2* 12.5* 12.4* 11.6*   MCV 97.1 95.8 97.4 101.0* 100.3*   .0* 124.0* 137.0* 142.0* 119.0*                   Recent Labs   Lab 04/12/24  1455 04/13/24  0541 04/14/24  0459 04/15/24  0422 04/16/24  0543 04/17/24  0816 04/18/24  0637   *   < > 145 143 146* 143 144   K 3.8   < > 3.7 3.8  3.8 4.4  4.4 4.1 4.0      < > 106 105 105 103 108   CO2 29.0   < > 37.0* 37.0* 36.0* 34.0* 33.0*   BUN 27*   < > 23 28* 31* 33* 36*   CREATSERUM 1.19   < > 1.06 1.06 1.17 1.12 1.04   CA 8.4*   < > 8.1* 8.2* 8.5 8.7 8.3*   MG 2.3  --  1.9 2.1  --  2.1  --    GLU 61*   < > 97 101* 92 93 89    < > = values in this interval not displayed.               Recent Labs   Lab 04/12/24  1455 04/14/24  0459 04/17/24  0816   ALT 33 30 24   AST 21 15 18   ALB 2.9* 2.9* 2.9*              Recent  Labs   Lab 04/12/24  1634 04/17/24  0857   PGLU 80 101*       S: seen and examined at bs. Family at bedside. No new complains. Overnight HR noted to be in 30's while sleeping, needed some O2 at that time as well.      objective  /76 (BP Location: Left arm)   Pulse 80   Temp 97.5 °F (36.4 °C) (Axillary)   Resp 22   Ht 5' 2\" (1.575 m)   Wt 136 lb 1.6 oz (61.7 kg)   SpO2 96%   BMI 24.89 kg/m²      Gen: No acute distress, alert and oriented x 2-3  Neck Supple, no JVD  Pulm: Lungs clear, normal respiratory effort, No wheezing or crackles  CV: Heart with regular rate and rhythm, No murmurs, rubs, gallops  Abd: Abdomen soft, nontender, nondistended, no organomegaly, bowel sounds present  MSK:  no clubbing, no cyanosis.  No Lower extremity edema  Psych: mood stable, cooperative  Neuro: no focal deficits, alert oriented x 2-3 (at baseline), 5 out of 5 strength upper and lower extremities, able to answer questions appropriately and follow commands        Assessment and Plan     89 year old male from home with a PMHx sig for afib, CAD,  HFpEF, prostate CA,  h/o cdiff, diverticulosis and HTN presents to the hospital with syncope and collapse.     Syncope and collapse  Rule out spontaneous hemorrhagic bleed  Bradycardia  Pauses  Afib  HFpEF  BPH     Plan:      - Telemetry- reviewed  - Cardiology evaluation, recs noted  - Etiology of syncope is unclear, patient has been off of elda blocking agents, heart rate would  have to be extremely low with pauses in order to have a profound syncopal episode  - CT head reviewed, no acute issues  - Will hold Eliquis for now  - Discussed with family.  Answered all questions.              4/18 Cardiology  Recent admission for HfPEF exacerbation  Syncope: recent episodes of pauses up to 4 seconds. Fmaily did not want pacemaker at that time but is now amenable to PPM  Paroxysmal AFIB  HTN  CAD     Plan:  Not on an AV elda blockers  Continue to monitor on TELE  EP consult. Unlikely  to see patient prior to Friday. Will allow us to observe for the next 48 hours to see if he has any significant pauses. He has only had pauses <5 seconds on last admission which were nocturnal. No urgent indication at this time for PPM or temporary wire   Hold eliquis   Contine PO lasix   Recent echo with normal LVEF          4/19 Cardiology  Impression:  syncope  Permanent AF  HFpEF     Plan:  Etiology of syncope is unknown and unclear if a pacemaker would be helpful. Orthostatic hypotesnsion most likely cause. Long discussion with family about this. Questions answered.         4/19  89 year old male from home with a PMHx sig for afib, CAD,  HFpEF, prostate CA,  h/o cdiff, diverticulosis and HTN presents to the hospital with syncope and collapse.     Syncope and collapse  -passed out while in kitchen with family, no injury reported as he was eased to the ground  -ECG w controlled rate AFIB   -troponin normal  -EP consulted, does not rec PPM, suspects orthostatic hypotension is causing syncope  -ECHO from 4/11/24, EF 55-60  -Etiology of syncope is unclear, patient has been off of elda blocking agents, heart rate will have to be extremely low with pauses in order to have a profound syncopal episode   -Will obtain CT head in the setting of Eliquis use >> neg for acute process   -4/19 orthostatic vital signs positive with syncopal event, lasix held tomorrow per cards, compression stockings, abdominal binder and daily orthostatic vital signs      Bradycardia  Pauses  -monitor on tele for pauses  -family was not interested in PPM last hospitalization, given new syncopal event they are now willing to consider  -EP eval as above     Afib  -rate controlled  -eliquis restarted     HFpEF  -no signs of vol overload upon physical exam  -lasix     BPH  -terazosin     Family's tentative plan is to send the patient home with hospice.  SW is on the case and the family is waiting to hear from [facility] hospice     GOC: DNAR  selective treatment     MA/ACO Reach  -Re- Entry: yes  -Consults: cards, EP  -Discharge Needs: TBD  -Appointments: PCP       FERMIN crowder in am  -diet-cardiac     Prophy  -SCD  -eliquis     Dispo  -pending clinical course and hospice eval    Positive orthostatic vital signs     Blood pressure 129/89, pulse 70, temperature 97.5 °F (36.4 °C), temperature source Oral, resp. rate 13, height 5' 2\" (1.575 m), weight 130 lb 1.1 oz (59 kg), SpO2 97%.     GENERAL: no apparent distress  NEURO: A/A Ox2-3  RESP: non labored, CTA  CARDIO: Regular, no murmur  ABD: soft, NT, ND, BS+  EXTREMITIES: no edema, no calf tenderness     DIAGNOSTIC DATA:   Labs:              Recent Labs   Lab 04/15/24  0422 04/16/24  0543 04/17/24  0816 04/18/24  0637 04/19/24  0556   WBC 3.0* 3.5* 3.6* 3.2* 3.0*   HGB 12.2* 12.5* 12.4* 11.6* 12.0*   MCV 95.8 97.4 101.0* 100.3* 97.6   .0* 137.0* 142.0* 119.0* 129.0*                    Recent Labs   Lab 04/12/24  1455 04/13/24  0541 04/14/24  0459 04/15/24  0422 04/16/24  0543 04/17/24  0816 04/18/24  0637 04/19/24  0556   *   < > 145 143 146* 143 144 144   K 3.8   < > 3.7 3.8  3.8 4.4  4.4 4.1 4.0 4.1      < > 106 105 105 103 108 109   CO2 29.0   < > 37.0* 37.0* 36.0* 34.0* 33.0* 30.0   BUN 27*   < > 23 28* 31* 33* 36* 42*   CREATSERUM 1.19   < > 1.06 1.06 1.17 1.12 1.04 0.95   CA 8.4*   < > 8.1* 8.2* 8.5 8.7 8.3* 8.7   MG 2.3  --  1.9 2.1  --  2.1  --   --    GLU 61*   < > 97 101* 92 93 89 98         4/19 Cardiology  LOC reported by RN when getting up to use the bathroom.   BP noted to have dropped from 144 to 96  No acute tele changes reported  Lasix held for tomorrow  Compression socks, abdominal binder, and daily orthostatic BP checks ordered   Likely will struggle with Diuresis vs orthostatic hypotension.                    MEDICATIONS ADMINISTERED IN LAST 1 DAY:  furosemide (Lasix) tab 40 mg       Date Action Dose Route User    4/19/2024 0842 Given 40 mg Oral Larissa Infante, RN           montelukast (Singulair) tab 10 mg       Date Action Dose Route User    4/18/2024 2024 Given 10 mg Oral Elissa Courtney RN          terazosin (Hytrin) cap 10 mg       Date Action Dose Route User    4/18/2024 2024 Given 10 mg Oral Elissa Courtney RN            Vitals (last day)       Date/Time Temp Pulse Resp BP SpO2 Weight O2 Device O2 Flow Rate (L/min) [facility]    04/19/24 1130 -- 78 15 96/63 99 % -- -- -- KT    04/19/24 1130 97.8 °F (36.6 °C) -- -- -- -- -- -- -- AL    04/19/24 1100 -- 122 23 113/69 94 % -- -- -- KT 04/19/24 1057 -- -- -- 113/59 -- -- -- -- KT 04/19/24 1055 -- -- -- 113/59 -- -- -- -- KT 04/19/24 1053 -- -- -- 144/89 -- -- -- -- KT 04/19/24 1045 -- 72 24 144/89 98 % -- -- -- KT    04/19/24 1030 -- 58 13 111/70 98 % -- -- -- KT 04/19/24 1015 -- 61 13 116/76 97 % -- -- -- KT 04/19/24 1000 -- 66 15 -- 98 % -- -- -- KT 04/19/24 0845 -- 79 19 -- 96 % -- -- -- KT    04/19/24 0830 -- 59 11 -- 98 % -- -- -- KT    04/19/24 0815 -- 56 18 -- 97 % -- -- -- KT 04/19/24 0809 97.5 °F (36.4 °C) 70 13 129/89 97 % -- Nasal cannula 2 L/min AL    04/19/24 0800 -- 64 19 -- 97 % -- -- -- KT    04/19/24 0745 -- 71 16 -- 97 % -- -- -- KT    04/19/24 0730 -- 54 13 -- 97 % -- -- -- KT    04/19/24 0715 -- 48 11 -- 96 % -- -- -- KT    04/19/24 0700 -- 63 18 -- 93 % -- -- -- KT    04/19/24 0645 -- 42 16 -- 94 % -- -- -- KT    04/19/24 0630 -- 42 15 -- 96 % -- -- -- KT    04/19/24 0615 -- 61 13 -- 96 % -- -- -- KT    04/19/24 0600 -- 58 22 -- 96 % -- -- -- KT    04/19/24 0545 -- 69 21 -- 94 % -- -- --     04/19/24 0543 -- -- -- -- -- 130 lb 1.1 oz -- --     04/19/24 0539 97.3 °F (36.3 °C) 79 16 122/83 91 % -- None (Room air) --     04/19/24 0530 -- 71 27 -- 94 % -- -- -- KT    04/19/24 0515 -- 67 18 -- 90 % -- -- -- KT    04/19/24 0500 -- 50 13 -- 93 % -- -- -- KT    04/19/24 0445 -- 63 17 -- 92 % -- -- -- KT    04/19/24 0430 -- 61 13 -- 94 % -- -- -- KT    04/19/24 0415 -- 68 19 -- 88 % -- -- -- KT     04/19/24 0400 -- 55 16 -- 97 % -- -- -- KT    04/18/24 2350 -- 78 15 -- 95 % -- None (Room air) --     04/18/24 2349 -- 66 19 -- 94 % -- None (Room air) --     04/18/24 2348 -- 78 24 -- 96 % -- None (Room air) --     04/18/24 2346 97.4 °F (36.3 °C) 59 15 113/88 91 % -- None (Room air) --     04/18/24 2100 -- -- -- -- -- -- Nasal cannula 2 L/min EK    04/18/24 2035 97.8 °F (36.6 °C) 72 23 123/79 98 % -- None (Room air) -- EK    04/18/24 1556 97.5 °F (36.4 °C) 80 22 108/76 96 % -- None (Room air) -- NN    04/18/24 1300 -- 65 17 -- 100 % -- -- -- NN    04/18/24 1201 98 °F (36.7 °C) 91 23 112/60 97 % -- None (Room air) -- NN    04/18/24 0945 -- 66 15 122/71 100 % -- -- --     04/18/24 0757 97.9 °F (36.6 °C) 70 13 91/66 98 % -- -- -- LS    04/18/24 0530 97.7 °F (36.5 °C) 68 14 132/90 94 % -- -- -- BN

## 2024-04-19 NOTE — PLAN OF CARE
LOC reported by RN when getting up to use the bathroom.   BP noted to have dropped from 144 to 96  No acute tele changes reported  Lasix held for tomorrow  Compression socks, abdominal binder, and daily orthostatic BP checks ordered   Likely will struggle with Diuresis vs orthostatic hypotension.

## 2024-04-19 NOTE — PROGRESS NOTES
[facility] AND [facility]   part of [facility]     Progress Note  Micheal Nickerson Patient Status:  Inpatient    1931 MRN [id number]   Location [facility] 2NE-A Attending Helder Machado, DO   Hosp Day # 2 PCP Thuan Moody MD       SEE ATTENDING NOTE AT BOTTOM OF PAGE    Is this a shared or split note between Advanced Practice Provider and Physician? Yes    Assessment and Plan:    89 year old male from home with a PMHx sig for afib, CAD,  HFpEF, prostate CA,  h/o cdiff, diverticulosis and HTN presents to the hospital with syncope and collapse.     Syncope and collapse  -passed out while in kitchen with family, no injury reported as he was eased to the ground  -ECG w controlled rate AFIB   -troponin normal  -EP consulted, does not rec PPM, suspects orthostatic hypotension is causing syncope  -ECHO from 24, EF 55-60  -Etiology of syncope is unclear, patient has been off of elda blocking agents, heart rate will have to be extremely low with pauses in order to have a profound syncopal episode   -Will obtain CT head in the setting of Eliquis use >> neg for acute process   - orthostatic vital signs positive with syncopal event, lasix held tomorrow per cards, compression stockings, abdominal binder and daily orthostatic vital signs      Bradycardia  Pauses  -monitor on tele for pauses  -family was not interested in PPM last hospitalization, given new syncopal event they are now willing to consider  -EP eval as above     Afib  -rate controlled  -eliquis restarted     HFpEF  -no signs of vol overload upon physical exam  -lasix     BPH  -terazosin    Family's tentative plan is to send the patient home with hospice.  SW is on the case and the family is waiting to hear from [facility]    GOC: DNAR selective treatment     MA/ACO Reach  -Re- Entry: yes  -Consults: cards, EP  -Discharge Needs: TBD  -Appointments: PCP       FERMIN  -lytes in am  -diet-cardiac      Prophy  -SCD  -eliquis     Dispo  -pending clinical course and hospice eval    PCP: Thuan Moody MD    Concerns regarding plan of care were discussed with patient. Patient agrees with plan as detailed above. Discussed plan of care with Dr. Machado    Note: This chart was prepared using voice recognition software and may contain unintended word substitution errors.          Ron BEE  [facility] Hospitalist Team  Contact via [employer] (Check Availability)  4/19/2024             SUBJECTIVE:   In bed  Denies pain, cp/sob, f/c, n/v  Family at bedside  He wants to go home  Positive orthostatic vital signs               OBJECTIVE:   Blood pressure 129/89, pulse 70, temperature 97.5 °F (36.4 °C), temperature source Oral, resp. rate 13, height 5' 2\" (1.575 m), weight 130 lb 1.1 oz (59 kg), SpO2 97%.    GENERAL: no apparent distress  NEURO: A/A Ox2-3  RESP: non labored, CTA  CARDIO: Regular, no murmur  ABD: soft, NT, ND, BS+  EXTREMITIES: no edema, no calf tenderness    DIAGNOSTIC DATA:   Labs:     Recent Labs   Lab 04/15/24  0422 04/16/24  0543 04/17/24  0816 04/18/24  0637 04/19/24  0556   WBC 3.0* 3.5* 3.6* 3.2* 3.0*   HGB 12.2* 12.5* 12.4* 11.6* 12.0*   MCV 95.8 97.4 101.0* 100.3* 97.6   .0* 137.0* 142.0* 119.0* 129.0*       Recent Labs   Lab 04/12/24  1455 04/13/24  0541 04/14/24  0459 04/15/24  0422 04/16/24  0543 04/17/24  0816 04/18/24  0637 04/19/24  0556   *   < > 145 143 146* 143 144 144   K 3.8   < > 3.7 3.8  3.8 4.4  4.4 4.1 4.0 4.1      < > 106 105 105 103 108 109   CO2 29.0   < > 37.0* 37.0* 36.0* 34.0* 33.0* 30.0   BUN 27*   < > 23 28* 31* 33* 36* 42*   CREATSERUM 1.19   < > 1.06 1.06 1.17 1.12 1.04 0.95   CA 8.4*   < > 8.1* 8.2* 8.5 8.7 8.3* 8.7   MG 2.3  --  1.9 2.1  --  2.1  --   --    GLU 61*   < > 97 101* 92 93 89 98    < > = values in this interval not displayed.       Recent Labs   Lab 04/12/24  1455 04/14/24  0459 04/17/24  0816   ALT 33  30 24   AST 21 15 18   ALB 2.9* 2.9* 2.9*       Recent Labs   Lab 04/12/24  1634 04/17/24  0857   PGLU 80 101*       No results for input(s): \"TROP\" in the last 168 hours.      MEDICATIONS      Current Facility-Administered Medications   Medication Dose Route Frequency    acetaminophen (Tylenol Extra Strength) tab 500 mg  500 mg Oral Q4H PRN    polyethylene glycol (PEG 3350) (Miralax) 17 g oral packet 17 g  17 g Oral Daily PRN    sennosides (Senokot) tab 17.2 mg  17.2 mg Oral Nightly PRN    bisacodyl (Dulcolax) 10 MG rectal suppository 10 mg  10 mg Rectal Daily PRN    fleet enema (Fleet) 7-19 GM/118ML rectal enema 133 mL  1 enema Rectal Once PRN    ondansetron (Zofran) 4 MG/2ML injection 4 mg  4 mg Intravenous Q6H PRN    metoclopramide (Reglan) 5 mg/mL injection 5 mg  5 mg Intravenous Q8H PRN    terazosin (Hytrin) cap 10 mg  10 mg Oral Nightly    apixaban (Eliquis) tab 2.5 mg  2.5 mg Oral BID    furosemide (Lasix) tab 40 mg  40 mg Oral Daily    montelukast (Singulair) tab 10 mg  10 mg Oral QPM                  IMAGING     CT BRAIN OR HEAD (57575)    Result Date: 4/17/2024  CONCLUSION:  No acute intracranial process.    LOCATION:  [facility]   Dictated by (CST): Ross Gray MD on 4/17/2024 at 9:11 PM     Finalized by (CST): Ross Gray MD on 4/17/2024 at 9:12 PM       XR CHEST AP PORTABLE  (CPT=71045)    Result Date: 4/17/2024  CONCLUSION:  1. Cardiomegaly with mild interstitial opacities likely representing mild edema. 2. Small left-sided pleural effusion with left basilar atelectasis/infiltrates.    LOCATION:  [facility]      Dictated by (CST): Camelia Figueroa MD on 4/17/2024 at 8:55 AM     Finalized by (CST): Camelia Figueroa MD on 4/17/2024 at 8:55 AM          SEE ATTENDING NOTE BELOW:     Patient seen and examined independently.  Discussed with APN and agree with note above.      S: Seen and examined at bedside.  Daughter at bedside.  No new issues.  Evaluated by EP.  Had a vasovagal response when having a bowel  movement in the bathroom today.  Family finalizing on going home with hospice    objective  BP 96/63 (BP Location: Left arm)   Pulse 78   Temp 97.8 °F (36.6 °C) (Oral)   Resp 15   Ht 5' 2\" (1.575 m)   Wt 130 lb 1.1 oz (59 kg)   SpO2 99%   BMI 23.79 kg/m²     Gen: No acute distress, alert and oriented x 2-3, frail elderly gentleman  Neck Supple, no JVD  Pulm: Lungs clear, normal respiratory effort, No wheezing or crackles  CV: Heart with regular rate and rhythm, No murmurs, rubs, gallops  Abd: Abdomen soft, nontender, nondistended, no organomegaly, bowel sounds present  MSK:  no clubbing, no cyanosis.  No Lower extremity edema  Psych: mood stable, cooperative  Neuro: no focal deficits, alert oriented x 2-3 (at baseline), 5 out of 5 strength upper and lower extremities, able to answer questions appropriately and follow commands        Assessment and Plan     89 year old male from home with a PMHx sig for afib, CAD,  HFpEF, prostate CA,  h/o cdiff, diverticulosis and HTN presents to the hospital with syncope and collapse.     Syncope and collapse  Rule out spontaneous hemorrhagic bleed  Bradycardia  Pauses  Afib  HFpEF  BPH  Orthostatic hypotension    Plan:      - Telemetry- reviewed  - Cardiology evaluation, recs noted  - Etiology of syncope is unclear, patient has been off of elda blocking agents, heart rate would  have to be extremely low with pauses in order to have a profound syncopal episode  - CT head reviewed, no acute issues  - Will hold Eliquis for now  - EP evaluation today, unclear etiology for syncope, unlikely to be resolved with the pacemaker.  Likely orthostatic hypotension  - Will plan on discontinuing Lasix for now, use Lasix as as needed-discussed with family  - Will start midodrine  - Terazosin is an alpha-blocker can also precipitate hypotensive episodes.  Can DC if patient is able to urinate without issues.  - Recommend checking ambulatory BP at home  - Discussed with family.  Answered all  questions.        Rest as above with above     Helder Machado, DO  Hospitalist  [facility]

## 2024-04-19 NOTE — PLAN OF CARE
Denies c/o pain or malaise.  Remains in AFIB, HRs mostly ranging from 70s-90s, irregular RRR.  PVCs noted somewhat frequently.  Lungs diminished on RA during the day.  Abdomen soft and non tender to touch with active bowel sounds in all four quadrants.  Negative for edema.  Plans to meet with hospice for a family conference.    At 1100 after we finished his orthostatic blood pressure, pt. Stated \"I need to go to the bathroom\"  Ani RN and myself walked him to the toilet at this point.  He was orthostatic postive, but asymptomatic.  He tried to have a BM on the toilet, but only urinated.  On the way back to the chair he stopped his movement and started to struggle.  Ani heard him say something to the effect of something didn't feel right.  We coerced him into the chair for safety reasons.  He started speaking gibberish, but lost consciousness.  He wasn't responding to sternal rubbing.  I called the staff assist and multiple staff helped me move him to the bed.  I could not get a solid and correct bp reading until he was in bed.  Unfortunately, while he was sitting in the chair prior to the episode I couldn't get a reading.  Once in bed, his bps were very stable.  I notified both cardiolgy and the hosptalist of what occurred.  I requested that we hold his PO lasix, since that was the only medication I had given him.  I also received T.O. for orthostatics daily and before getting out of bed to put JOSE LUIS hose on and an abdominal binder.  I reviewed telemetry to look for pauses or major drops in the HRs and saw nothing.  I suspect this all blood pressure related.        Problem: CARDIOVASCULAR - ADULT  Goal: Maintains optimal cardiac output and hemodynamic stability  Description: INTERVENTIONS:  - Monitor vital signs, rhythm, and trends  - Monitor for bleeding, hypotension and signs of decreased cardiac output  - Evaluate effectiveness of vasoactive medications to optimize hemodynamic stability  - Monitor arterial  and/or venous puncture sites for bleeding and/or hematoma  - Assess quality of pulses, skin color and temperature  - Assess for signs of decreased coronary artery perfusion - ex. Angina  - Evaluate fluid balance, assess for edema, trend weights  Outcome: Progressing  Goal: Absence of cardiac arrhythmias or at baseline  Description: INTERVENTIONS:  - Continuous cardiac monitoring, monitor vital signs, obtain 12 lead EKG if indicated  - Evaluate effectiveness of antiarrhythmic and heart rate control medications as ordered  - Initiate emergency measures for life threatening arrhythmias  - Monitor electrolytes and administer replacement therapy as ordered  Outcome: Progressing     Problem: SKIN/TISSUE INTEGRITY - ADULT  Goal: Incision(s), wounds(s) or drain site(s) healing without S/S of infection  Description: INTERVENTIONS:  - Assess and document risk factors for pressure ulcer development  - Assess and document skin integrity  - Assess and document dressing/incision, wound bed, drain sites and surrounding tissue  - Implement wound care per orders  - Initiate isolation precautions as appropriate  - Initiate Pressure Ulcer prevention bundle as indicated  Outcome: Progressing

## 2024-04-19 NOTE — CONGREGATE LIVING REVIEW
Congregate Living Authorization    [facility] ([facility]) has reviewed this case and the committee DOES NOT RECOMMEND discharge to a skilled nursing facility under skilled care.    The CLRC recommends:  Home with home health and any other appropriate caregiver assistance or medical equipment as needed vs respite in SNF.     Does not appear to have skilled services for TOMI, but additional home support appears to be needed.    For questions regarding CLRC approval process, please contact the CM assigned to the case.  For questions regarding RN discharge workflow, please contact the unit Clinical Leader.

## 2024-04-19 NOTE — CM/SW NOTE
Problem: Adult Inpatient Plan of Care  Goal: Plan of Care Review  Outcome: Ongoing, Progressing  Goal: Patient-Specific Goal (Individualized)  Outcome: Ongoing, Progressing  Goal: Absence of Hospital-Acquired Illness or Injury  Outcome: Ongoing, Progressing  Intervention: Prevent and Manage VTE (Venous Thromboembolism) Risk  Recent Flowsheet Documentation  Taken 10/27/2023 0745 by Abel Barrett RN  Activity Management: up ad hira  Goal: Optimal Comfort and Wellbeing  Outcome: Ongoing, Progressing  Goal: Readiness for Transition of Care  Outcome: Ongoing, Progressing     Problem: Bleeding (Labor)  Goal: Hemostasis  Outcome: Ongoing, Progressing     Problem: Skin Injury Risk Increased  Goal: Skin Health and Integrity  Outcome: Ongoing, Progressing     Problem: Adjustment to Role Transition (Postpartum Vaginal Delivery)  Goal: Successful Maternal Role Transition  Outcome: Ongoing, Progressing     Problem: Bleeding (Postpartum Vaginal Delivery)  Goal: Hemostasis  Outcome: Ongoing, Progressing     Problem: Infection (Postpartum Vaginal Delivery)  Goal: Absence of Infection Signs/Symptoms  Outcome: Ongoing, Progressing     Problem: Pain (Postpartum Vaginal Delivery)  Goal: Acceptable Pain Control  Outcome: Ongoing, Progressing     Problem: Urinary Retention (Postpartum Vaginal Delivery)  Goal: Effective Urinary Elimination  Outcome: Ongoing, Progressing   Goal Outcome Evaluation:                         Spoke with Phani with [facility]--they have spoken with patient's daughter with hospice meeting for Saturday 4/20 @ 1pm

## 2024-04-19 NOTE — CM/SW NOTE
Met with patient's daughter to update her of the TOMI referral status--provided her with the [facility] potential accepting list and need for insurance auth if that was direction patient/family leaning towards.  Kylie very appreciative for list--shared they are leaning towards getting dad home with hospice.  [facility]---explained if this the chosen direction, would let agency order and deliver equipment--ie bed, table, possible oxygen for nocturnal comfort, etc.  Once everything in place, would assist in ambulance transfer to her home (pt resides with daughter).  Kylie confirmed dad does not want rehab--he just \"wants to go home . . . Stating from the [place] . . . There's no place like home!\"    CM/SW to remain available for any further assistance--await call back from [facility] hospice.

## 2024-04-20 LAB
ANION GAP SERPL CALC-SCNC: 4 MMOL/L (ref 0–18)
BASOPHILS # BLD AUTO: 0.02 X10(3) UL (ref 0–0.2)
BASOPHILS NFR BLD AUTO: 0.6 %
BUN BLD-MCNC: 40 MG/DL (ref 9–23)
CALCIUM BLD-MCNC: 8.6 MG/DL (ref 8.5–10.1)
CHLORIDE SERPL-SCNC: 110 MMOL/L (ref 98–112)
CO2 SERPL-SCNC: 32 MMOL/L (ref 21–32)
CREAT BLD-MCNC: 0.96 MG/DL
EGFRCR SERPLBLD CKD-EPI 2021: 74 ML/MIN/1.73M2 (ref 60–?)
EOSINOPHIL # BLD AUTO: 0.07 X10(3) UL (ref 0–0.7)
EOSINOPHIL NFR BLD AUTO: 2.2 %
ERYTHROCYTE [DISTWIDTH] IN BLOOD BY AUTOMATED COUNT: 14.9 %
GLUCOSE BLD-MCNC: 95 MG/DL (ref 70–99)
HCT VFR BLD AUTO: 37.3 %
HGB BLD-MCNC: 11.9 G/DL
IMM GRANULOCYTES # BLD AUTO: 0.01 X10(3) UL (ref 0–1)
IMM GRANULOCYTES NFR BLD: 0.3 %
LYMPHOCYTES # BLD AUTO: 0.75 X10(3) UL (ref 1–4)
LYMPHOCYTES NFR BLD AUTO: 23.4 %
MCH RBC QN AUTO: 31.7 PG (ref 26–34)
MCHC RBC AUTO-ENTMCNC: 31.9 G/DL (ref 31–37)
MCV RBC AUTO: 99.5 FL
MONOCYTES # BLD AUTO: 0.47 X10(3) UL (ref 0.1–1)
MONOCYTES NFR BLD AUTO: 14.7 %
NEUTROPHILS # BLD AUTO: 1.88 X10 (3) UL (ref 1.5–7.7)
NEUTROPHILS # BLD AUTO: 1.88 X10(3) UL (ref 1.5–7.7)
NEUTROPHILS NFR BLD AUTO: 58.8 %
OSMOLALITY SERPL CALC.SUM OF ELEC: 312 MOSM/KG (ref 275–295)
PLATELET # BLD AUTO: 132 10(3)UL (ref 150–450)
POTASSIUM SERPL-SCNC: 3.9 MMOL/L (ref 3.5–5.1)
RBC # BLD AUTO: 3.75 X10(6)UL
SODIUM SERPL-SCNC: 146 MMOL/L (ref 136–145)
WBC # BLD AUTO: 3.2 X10(3) UL (ref 4–11)

## 2024-04-20 NOTE — CM/SW NOTE
CM informed patient will discharge home tomorrow at 11 AM with [facility] services, agency to arrange discharge transportation with [facility].     Almaz Fallon RN Case Manager [id number]

## 2024-04-20 NOTE — PLAN OF CARE
HR 27 around 0620, pt sleeping at that time.  Alert and confused, denies pain. Bedrest rest. Daughter at bedside. Kept clean and dry    Plan: hospice meeting at 1pm    Problem: CARDIOVASCULAR - ADULT  Goal: Maintains optimal cardiac output and hemodynamic stability  Description: INTERVENTIONS:  - Monitor vital signs, rhythm, and trends  - Monitor for bleeding, hypotension and signs of decreased cardiac output  - Evaluate effectiveness of vasoactive medications to optimize hemodynamic stability  - Monitor arterial and/or venous puncture sites for bleeding and/or hematoma  - Assess quality of pulses, skin color and temperature  - Assess for signs of decreased coronary artery perfusion - ex. Angina  - Evaluate fluid balance, assess for edema, trend weights  Outcome: Progressing  Goal: Absence of cardiac arrhythmias or at baseline  Description: INTERVENTIONS:  - Continuous cardiac monitoring, monitor vital signs, obtain 12 lead EKG if indicated  - Evaluate effectiveness of antiarrhythmic and heart rate control medications as ordered  - Initiate emergency measures for life threatening arrhythmias  - Monitor electrolytes and administer replacement therapy as ordered  Outcome: Progressing     Problem: SKIN/TISSUE INTEGRITY - ADULT  Goal: Incision(s), wounds(s) or drain site(s) healing without S/S of infection  Description: INTERVENTIONS:  - Assess and document risk factors for pressure ulcer development  - Assess and document skin integrity  - Assess and document dressing/incision, wound bed, drain sites and surrounding tissue  - Implement wound care per orders  - Initiate isolation precautions as appropriate  - Initiate Pressure Ulcer prevention bundle as indicated  Outcome: Progressing     Problem: Impaired Functional Mobility  Goal: Achieve highest/safest level of mobility/gait  Description: Interventions:  - Assess patient's functional ability and stability  - Promote increasing activity/tolerance for mobility and  gait  - Educate and engage patient/family in tolerated activity level and precautions  - Recommend use of chair position in bed 3 times per day  Outcome: Progressing

## 2024-04-20 NOTE — PROGRESS NOTES
04/20/24 0734 04/20/24 0738 04/20/24 0741   Vital Signs   Pulse 80 90 92   Heart Rate Source Monitor Monitor Monitor   Cardiac Rhythm Afib Afib Afib   Resp  --  21 20   BP (!) 160/98 (!) 132/96 109/79   MAP (mmHg) (!) 116 (!) 108 87   BP Location Left arm Left arm Left arm   BP Method Automatic Automatic Automatic   Patient Position Lying Sitting Standing   Oxygen Therapy   SpO2 94 % 95 % 94 %   O2 Device None (Room air) None (Room air) None (Room air)       Orthostatic vitals as above

## 2024-04-20 NOTE — PROGRESS NOTES
[facility]   part of [facility]     Progress Note  Micheal Nickerson Patient Status:  Inpatient    1931 MRN [id number]   Location [facility] 2NE-A Attending Helder Machado,    Hosp Day # 3 PCP Thuan Moody MD       Assessment and Plan:    89 year old male from home with a PMHx sig for afib, CAD,  HFpEF, prostate CA,  h/o cdiff, diverticulosis and HTN presents to the hospital with syncope and collapse.     Syncope and collapse  -Likely secondary to orthostatic hypotension  -EP consulted, does not rec PPM, suspects orthostatic hypotension is causing syncope  -ECHO from 24, EF 55-60  -Etiology of syncope is unclear, patient has been off of elda blocking agents, heart rate will have to be extremely low with pauses in order to have a profound syncopal episode   -Will obtain CT head in the setting of Eliquis use >> neg for acute process   - orthostatic vital signs positive with syncopal event, lasix held tomorrow per cards, compression stockings, abdominal binder and daily orthostatic vital signs   -Midodrine added  - Terazosin is an alpha-blocker can also precipitate hypotensive episodes.  Can DC if patient is able to urinate without issues.  - Recommend checking ambulatory BP at home     Bradycardia  Pauses  -monitor on tele for pauses  -EP eval as above     Afib  -rate controlled  -eliquis restarted     HFpEF  -no signs of vol overload upon physical exam  -lasix     BPH  -terazosin    Advance care planning  Family's tentative plan is to send the patient home with hospice.  Hospice meeting on 2024 at 1 PM.  Social work to assist    GOC: DNAR selective treatment     MA/ACO Reach  -Re- Entry: yes  -Consults: cards, EP  -Discharge Needs: TBD  -Appointments: PCP       FERMIN  -lytes in am  -diet-cardiac     Prophy  -SCD  -eliquis     Dispo  -pending clinical course and hospice eval    PCP: Thuan Moody MD      Note: This chart was prepared using voice  recognition software and may contain unintended word substitution errors.          Helder Machado DO  Hospitalist  [facility]               SUBJECTIVE:   Seen and examined at bedside.  Laying in bed.  Did not sleep much at night.  Daughter at bedside.  No new issues.  No events on telemetry.               OBJECTIVE:   Blood pressure 109/79, pulse 92, temperature 97.9 °F (36.6 °C), temperature source Oral, resp. rate 20, height 5' 2\" (1.575 m), weight 130 lb 1.1 oz (59 kg), SpO2 94%.    GENERAL: no apparent distress  NEURO: A/A Ox2-3  RESP: non labored, CTA  CARDIO: Regular, no murmur  ABD: soft, NT, ND, BS+  EXTREMITIES: no edema, no calf tenderness    DIAGNOSTIC DATA:   Labs:     Recent Labs   Lab 04/16/24  0543 04/17/24  0816 04/18/24  0637 04/19/24  0556 04/20/24  0536   WBC 3.5* 3.6* 3.2* 3.0* 3.2*   HGB 12.5* 12.4* 11.6* 12.0* 11.9*   MCV 97.4 101.0* 100.3* 97.6 99.5   .0* 142.0* 119.0* 129.0* 132.0*       Recent Labs   Lab 04/14/24  0459 04/15/24  0422 04/16/24  0543 04/17/24  0816 04/18/24  0637 04/19/24  0556 04/20/24  0536    143 146* 143 144 144 146*   K 3.7 3.8  3.8 4.4  4.4 4.1 4.0 4.1 3.9    105 105 103 108 109 110   CO2 37.0* 37.0* 36.0* 34.0* 33.0* 30.0 32.0   BUN 23 28* 31* 33* 36* 42* 40*   CREATSERUM 1.06 1.06 1.17 1.12 1.04 0.95 0.96   CA 8.1* 8.2* 8.5 8.7 8.3* 8.7 8.6   MG 1.9 2.1  --  2.1  --   --   --    GLU 97 101* 92 93 89 98 95       Recent Labs   Lab 04/14/24  0459 04/17/24  0816   ALT 30 24   AST 15 18   ALB 2.9* 2.9*       Recent Labs   Lab 04/17/24  0857   PGLU 101*       No results for input(s): \"TROP\" in the last 168 hours.      MEDICATIONS      Current Facility-Administered Medications   Medication Dose Route Frequency    artificial saliva substitute (Biotene) oral solution   Oral PRN    midodrine (ProAmatine) tab 5 mg  5 mg Oral BID AC    acetaminophen (Tylenol Extra Strength) tab 500 mg  500 mg Oral Q4H PRN    polyethylene glycol (PEG 3350) (Miralax)  17 g oral packet 17 g  17 g Oral Daily PRN    sennosides (Senokot) tab 17.2 mg  17.2 mg Oral Nightly PRN    bisacodyl (Dulcolax) 10 MG rectal suppository 10 mg  10 mg Rectal Daily PRN    fleet enema (Fleet) 7-19 GM/118ML rectal enema 133 mL  1 enema Rectal Once PRN    ondansetron (Zofran) 4 MG/2ML injection 4 mg  4 mg Intravenous Q6H PRN    metoclopramide (Reglan) 5 mg/mL injection 5 mg  5 mg Intravenous Q8H PRN    terazosin (Hytrin) cap 10 mg  10 mg Oral Nightly    apixaban (Eliquis) tab 2.5 mg  2.5 mg Oral BID    montelukast (Singulair) tab 10 mg  10 mg Oral QPM                  IMAGING     CT BRAIN OR HEAD (62149)    Result Date: 4/17/2024  CONCLUSION:  No acute intracranial process.    LOCATION:  [facility]   Dictated by (CST): Ross Gray MD on 4/17/2024 at 9:11 PM     Finalized by (CST): Ross Gray MD on 4/17/2024 at 9:12 PM

## 2024-04-20 NOTE — PLAN OF CARE
Received pt at shift change. Alert x1-2. Room air. Denies pain/SOB. Vitals stable. Afib w controlled rates on tele. Pauses overnight.  See flowsheets for additional assessments.   Pt updated on POC. Call light within reach. All needs met at this time.     Plan:  -Orthos Qshift  -JOSE LUIS hose and abd binder  -Midodrine 5mg BID  -Hospice meeting today @ 1pm    Problem: Patient/Family Goals  Goal: Patient/Family Long Term Goal  Description: Patient's Long Term Goal: to go home    Interventions:  - tele monitoring, labs, EP consultation, increase activity, skin care    - See additional Care Plan goals for specific interventions  Outcome: Progressing  Goal: Patient/Family Short Term Goal  Description: Patient's Short Term Goal: feel better    Interventions:   - - tele monitoring, labs, EP consultation, increase activity, skin care    - See additional Care Plan goals for specific interventions  Outcome: Progressing     Problem: CARDIOVASCULAR - ADULT  Goal: Maintains optimal cardiac output and hemodynamic stability  Description: INTERVENTIONS:  - Monitor vital signs, rhythm, and trends  - Monitor for bleeding, hypotension and signs of decreased cardiac output  - Evaluate effectiveness of vasoactive medications to optimize hemodynamic stability  - Monitor arterial and/or venous puncture sites for bleeding and/or hematoma  - Assess quality of pulses, skin color and temperature  - Assess for signs of decreased coronary artery perfusion - ex. Angina  - Evaluate fluid balance, assess for edema, trend weights  Outcome: Progressing  Goal: Absence of cardiac arrhythmias or at baseline  Description: INTERVENTIONS:  - Continuous cardiac monitoring, monitor vital signs, obtain 12 lead EKG if indicated  - Evaluate effectiveness of antiarrhythmic and heart rate control medications as ordered  - Initiate emergency measures for life threatening arrhythmias  - Monitor electrolytes and administer replacement therapy as ordered  Outcome:  Progressing     Problem: SKIN/TISSUE INTEGRITY - ADULT  Goal: Incision(s), wounds(s) or drain site(s) healing without S/S of infection  Description: INTERVENTIONS:  - Assess and document risk factors for pressure ulcer development  - Assess and document skin integrity  - Assess and document dressing/incision, wound bed, drain sites and surrounding tissue  - Implement wound care per orders  - Initiate isolation precautions as appropriate  - Initiate Pressure Ulcer prevention bundle as indicated  Outcome: Progressing     Problem: Impaired Functional Mobility  Goal: Achieve highest/safest level of mobility/gait  Description: Interventions:  - Assess patient's functional ability and stability  - Promote increasing activity/tolerance for mobility and gait  - Educate and engage patient/family in tolerated activity level and precautions  Outcome: Progressing

## 2024-04-20 NOTE — PROGRESS NOTES
[facility]   part of [facility]    Cardiology Progress Note    Micheal Nickerson Patient Status:  Inpatient    1931 MRN [id number]   Location [facility] 2NE-A Attending Helder Machado,    Hosp Day # 3 PCP Thuan Moody MD       Impression/Plan:  89 year old male presenting with:    Recent admission for HfPEF exacerbation (EF 55-60%)  Syncope: recent episodes of pauses up to 4 seconds. Family did not want pacemaker at that time but is now amenable to PPM  Paroxysmal AFIB  HTN  CAD    - Orthostatics positive this am, likely etiology of syncope.  Increased oral hydration encouraged.  Continue midodrine 5mg tid, increase as needed (will likely need to allow some extent of supine HTN to avoid significant orthostatic hypotension)  - Daily orthostatics  - Evaluated by EP, no clear indication for ppm at this time  - Cont eliquis for stroke prevention  - Cont to hold diuretic; monitor volume status closely  - Monitor on tele  - Will follow     Subjective: No events overnight.  Today patient without acute complaints.  Wife at the bedside.    Patient Active Problem List   Diagnosis    S/P bilateral inguinal hernia repair [] & [] - CUONG Sosa*    H/O basal cell cancer - S. 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 Maltezos    Tendinitis of Lt rotator cuff - Dr. Mirza    H/O [] umbilical hernia repair - CUONG Sosa*    Prostate cancer / Dx [] Rx Trelstar Injections - DIANE Hernandez ([facility])    Systolic click    PAC (premature atrial contraction)    Anemia, chronic disease    Borderline high cholesterol    Early cataracts, bilateral    Encounter for long-term (current) use of medications+    Diverticulosis of intestine, part unspecified, without perforation or abscess without bleeding    Dysphagia, oral  phase    Enterocolitis due to Clostridium difficile, not specified as recurrent    Muscle wasting and atrophy, not elsewhere classified, other site    Nonrheumatic mitral valve disorder, unspecified    Other shoulder lesions, left shoulder    Unspecified abnormalities of gait and mobility    Thrombocytopenia (HCC)    Anemia    Azotemia    Pneumonia due to COVID-19 virus    Hyperglycemia    Diarrhea    Diarrhea, unspecified type    Hypotension, unspecified hypotension type    Pleural effusion    Elevated brain natriuretic peptide (BNP) level    Syncope, near    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, right (HCC)    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)    Anasarca    Leukopenia, unspecified type    Candidiasis of genitalia    Atrial fibrillation, chronic (HCC)    Hydrocele, unspecified hydrocele type    Syncope and collapse    Bradycardia    Palliative care encounter    Counseling regarding advance care planning and goals of care       Objective:   Temp: 97.9 °F (36.6 °C)  Pulse: 92  Resp: 20  BP: 109/79    Intake/Output:     Intake/Output Summary (Last 24 hours) at 4/20/2024 0838  Last data filed at 4/20/2024 0545  Gross per 24 hour   Intake 20 ml   Output 800 ml   Net -780 ml       Last 3 Weights   04/19/24 0543 130 lb 1.1 oz (59 kg)   04/17/24 1159 136 lb 1.6 oz (61.7 kg)   04/17/24 0806 120 lb (54.4 kg)   04/16/24 0455 133 lb 2.5 oz (60.4 kg)   04/14/24 0445 146 lb 6.2 oz (66.4 kg)   04/13/24 0537 146 lb 4.8 oz (66.4 kg)   04/12/24 2246 148 lb 13 oz (67.5 kg)   04/12/24 1446 151 lb 14.4 oz (68.9 kg)   04/20/23 0700 152 lb (68.9 kg)       Tele: AF, slow VR, PVC    Physical Exam:    General: Alert and oriented x 3. No apparent distress. No respiratory or constitutional distress.  HEENT: Normocephalic, anicteric sclera, neck supple, no thyromegaly or adenopathy.  Neck: No JVD, carotids 2+, no bruits.  Cardiac: Irregularly irregular  Lungs: Clear without wheezes, rales, rhonchi or  dullness.  Normal excursions and effort.  Abdomen: Soft, non-tender. No organosplenomegally, mass or rebound, BS-present.  Extremities: Without clubbing, cyanosis or edema.  Peripheral pulses are 2+.  Neurologic: Alert and oriented, normal affect. No motor or coordinational deficit.  Skin: Warm and dry.     Laboratory/Data:    Labs:         Recent Labs   Lab 04/16/24  0543 04/17/24  0816 04/18/24  0637 04/19/24  0556 04/20/24  0536   WBC 3.5* 3.6* 3.2* 3.0* 3.2*   HGB 12.5* 12.4* 11.6* 12.0* 11.9*   MCV 97.4 101.0* 100.3* 97.6 99.5   .0* 142.0* 119.0* 129.0* 132.0*       Recent Labs   Lab 04/14/24  0459 04/15/24  0422 04/16/24  0543 04/17/24  0816 04/18/24  0637 04/19/24  0556 04/20/24  0536    143 146* 143 144 144 146*   K 3.7 3.8  3.8 4.4  4.4 4.1 4.0 4.1 3.9    105 105 103 108 109 110   CO2 37.0* 37.0* 36.0* 34.0* 33.0* 30.0 32.0   BUN 23 28* 31* 33* 36* 42* 40*   CREATSERUM 1.06 1.06 1.17 1.12 1.04 0.95 0.96   CA 8.1* 8.2* 8.5 8.7 8.3* 8.7 8.6   MG 1.9 2.1  --  2.1  --   --   --    GLU 97 101* 92 93 89 98 95       Recent Labs   Lab 04/14/24 0459 04/17/24 0816   ALT 30 24   AST 15 18   ALB 2.9* 2.9*       No results for input(s): \"TROP\" in the last 168 hours.    Allergies:   Allergies   Allergen Reactions    Contrast Dye [Gadolinium Derivatives]        Medications:  Current Facility-Administered Medications   Medication Dose Route Frequency    artificial saliva substitute (Biotene) oral solution   Oral PRN    midodrine (ProAmatine) tab 5 mg  5 mg Oral BID AC    acetaminophen (Tylenol Extra Strength) tab 500 mg  500 mg Oral Q4H PRN    polyethylene glycol (PEG 3350) (Miralax) 17 g oral packet 17 g  17 g Oral Daily PRN    sennosides (Senokot) tab 17.2 mg  17.2 mg Oral Nightly PRN    bisacodyl (Dulcolax) 10 MG rectal suppository 10 mg  10 mg Rectal Daily PRN    fleet enema (Fleet) 7-19 GM/118ML rectal enema 133 mL  1 enema Rectal Once PRN    ondansetron (Zofran) 4 MG/2ML injection 4 mg  4 mg  Intravenous Q6H PRN    metoclopramide (Reglan) 5 mg/mL injection 5 mg  5 mg Intravenous Q8H PRN    terazosin (Hytrin) cap 10 mg  10 mg Oral Nightly    apixaban (Eliquis) tab 2.5 mg  2.5 mg Oral BID    montelukast (Singulair) tab 10 mg  10 mg Oral QPM       Kev Singh MD  4/20/2024  8:38 AM

## 2024-04-21 VITALS
SYSTOLIC BLOOD PRESSURE: 137 MMHG | WEIGHT: 130.06 LBS | HEIGHT: 62 IN | TEMPERATURE: 98 F | BODY MASS INDEX: 23.93 KG/M2 | OXYGEN SATURATION: 93 % | RESPIRATION RATE: 20 BRPM | DIASTOLIC BLOOD PRESSURE: 89 MMHG | HEART RATE: 76 BPM

## 2024-04-21 LAB
ANION GAP SERPL CALC-SCNC: 4 MMOL/L (ref 0–18)
BASOPHILS # BLD AUTO: 0.02 X10(3) UL (ref 0–0.2)
BASOPHILS NFR BLD AUTO: 0.4 %
BUN BLD-MCNC: 41 MG/DL (ref 9–23)
CALCIUM BLD-MCNC: 8.6 MG/DL (ref 8.5–10.1)
CHLORIDE SERPL-SCNC: 111 MMOL/L (ref 98–112)
CO2 SERPL-SCNC: 29 MMOL/L (ref 21–32)
CREAT BLD-MCNC: 0.92 MG/DL
EGFRCR SERPLBLD CKD-EPI 2021: 78 ML/MIN/1.73M2 (ref 60–?)
EOSINOPHIL # BLD AUTO: 0.06 X10(3) UL (ref 0–0.7)
EOSINOPHIL NFR BLD AUTO: 1.3 %
ERYTHROCYTE [DISTWIDTH] IN BLOOD BY AUTOMATED COUNT: 14.6 %
GLUCOSE BLD-MCNC: 94 MG/DL (ref 70–99)
HCT VFR BLD AUTO: 37.8 %
HGB BLD-MCNC: 12.1 G/DL
IMM GRANULOCYTES # BLD AUTO: 0.01 X10(3) UL (ref 0–1)
IMM GRANULOCYTES NFR BLD: 0.2 %
LYMPHOCYTES # BLD AUTO: 0.83 X10(3) UL (ref 1–4)
LYMPHOCYTES NFR BLD AUTO: 17.8 %
MCH RBC QN AUTO: 31.8 PG (ref 26–34)
MCHC RBC AUTO-ENTMCNC: 32 G/DL (ref 31–37)
MCV RBC AUTO: 99.2 FL
MONOCYTES # BLD AUTO: 0.65 X10(3) UL (ref 0.1–1)
MONOCYTES NFR BLD AUTO: 14 %
NEUTROPHILS # BLD AUTO: 3.08 X10 (3) UL (ref 1.5–7.7)
NEUTROPHILS # BLD AUTO: 3.08 X10(3) UL (ref 1.5–7.7)
NEUTROPHILS NFR BLD AUTO: 66.3 %
OSMOLALITY SERPL CALC.SUM OF ELEC: 308 MOSM/KG (ref 275–295)
PLATELET # BLD AUTO: 138 10(3)UL (ref 150–450)
PLATELETS.RETICULATED NFR BLD AUTO: 3.1 % (ref 0–7)
POTASSIUM SERPL-SCNC: 4.2 MMOL/L (ref 3.5–5.1)
RBC # BLD AUTO: 3.81 X10(6)UL
SODIUM SERPL-SCNC: 144 MMOL/L (ref 136–145)
WBC # BLD AUTO: 4.7 X10(3) UL (ref 4–11)

## 2024-04-21 RX ORDER — MIDODRINE HYDROCHLORIDE 5 MG/1
5 TABLET ORAL 3 TIMES DAILY
Status: DISCONTINUED | OUTPATIENT
Start: 2024-04-21 | End: 2024-04-21

## 2024-04-21 RX ORDER — MIDODRINE HYDROCHLORIDE 5 MG/1
5 TABLET ORAL 3 TIMES DAILY
Qty: 90 TABLET | Refills: 0 | Status: SHIPPED | OUTPATIENT
Start: 2024-04-21 | End: 2024-05-21

## 2024-04-21 NOTE — PROGRESS NOTES
Explained discharge instructions to family. Sent w copy of POLST. Verbalized understanding, IV removed, tele monitor discontinued. Will be transported via [facility].

## 2024-04-21 NOTE — PROGRESS NOTES
Received pt at shift change. AxOx4. Room air. Denies pain/SOB. Vitals stable. Afib w controlled rates on tele.  See flowsheets for additional assessments.   Pt updated on POC. Call light within reach. All needs met at this time.     Plan:  -Discharge home on hospice today

## 2024-04-21 NOTE — PHYSICAL THERAPY NOTE
Attempting PT treatment. Per chart review pt and family have elected hospice and plan for dc to home this am. Will sign off.

## 2024-04-21 NOTE — DISCHARGE SUMMARY
General Medicine Discharge Summary     Patient ID:  Mihceal Nickerson  89 year old  12/30/1931    Admit date: 4/17/2024    Discharge date and time: 4/21/2024 11:12 AM     Attending Physician: No att. providers found     Consults: IP CONSULT TO CARDIOLOGY  IP CONSULT TO SOCIAL WORK  IP CONSULT PALLIATIVE CARE  IP CONSULT TO SOCIAL WORK  IP CONSULT TO SOCIAL WORK    Primary Care Physician: Thuan Moody MD     Reason for admission: syncope    Risk For Readmission: Low    Discharge Diagnoses: Syncope and collapse [R55]  Bradycardia [R00.1]  See Additional Discharge Diagnoses in Hospital Course    Discharged Condition: stable    Follow-up with labs/images appointments: Follow-up with PCP as needed    Exam  Gen: No acute distress  Pulm: Lungs clear, normal respiratory effort  CV: Heart with regular rate and rhythm  Abd: Abdomen soft,       HPI:     Hospital Course:   89 year old male from home with a PMHx sig for afib, CAD,  HFpEF, prostate CA,  h/o cdiff, diverticulosis and HTN presents to the hospital with syncope and collapse.  Patient was seen and evaluated by cardiology.  Initially was concerning for bradycardia and pauses however further evaluation with EP suggestive of bradycardia being unlikely cause for his syncope.  Likely cause for his syncope was orthostatic hypotension.  His diuretics were discontinued.  He was started on midodrine 3 times daily.  Also recommend discontinuing doxazosin if further episodes continue to occur.  But it is continued at the time of discharge.  Family met with palliative care and decided to go home with hospice.  Social work assisted with the process.  Recommend follow-up with PCP for maintenance care as needed       Syncope and collapse  -Likely secondary to orthostatic hypotension  -EP consulted, does not rec PPM, suspects orthostatic hypotension is causing syncope  -ECHO from 4/11/24, EF 55-60  -Etiology of syncope is unclear, patient has been off of elda blocking agents,  heart rate will have to be extremely low with pauses in order to have a profound syncopal episode   -Will obtain CT head in the setting of Eliquis use >> neg for acute process   -4/19 orthostatic vital signs positive with syncopal event, lasix held tomorrow per cards, compression stockings, abdominal binder and daily orthostatic vital signs   -Midodrine added  - Terazosin is an alpha-blocker can also precipitate hypotensive episodes.  Can DC if patient is able to urinate without issues.  - Recommend checking ambulatory BP at home     Bradycardia  Pauses  -monitor on tele for pauses  -EP eval as above     Afib  -rate controlled  -eliquis restarted     HFpEF  -no signs of vol overload upon physical exam  -lasix     BPH  -terazosin        Operative Procedures:      Imaging: No results found.      Disposition: home    Activity: activity as tolerated  Diet: regular diet  Wound Care: none needed  Code Status: DNAR/Selective Treatment  O2: None    Home Medication Changes:     Med list     Medication List        START taking these medications      midodrine 5 MG Tabs  Commonly known as: ProAmatine  Take 1 tablet (5 mg total) by mouth in the morning and 1 tablet (5 mg total) at noon and 1 tablet (5 mg total) in the evening.            CONTINUE taking these medications      doxazosin 8 MG Tabs  Commonly known as: Cardura  TAKE 1 TABLET BY MOUTH DAILY 30 MINUTES AFTER DINNER     Eliquis 2.5 MG Tabs  Generic drug: apixaban  Take 1 tablet (2.5 mg total) by mouth 2 (two) times a day.     montelukast 10 MG Tabs  Commonly known as: Singulair  Take 1 tablet (10 mg total) by mouth every evening.            STOP taking these medications      furosemide 40 MG Tabs  Commonly known as: Lasix               Where to Get Your Medications        These medications [id number] - [place], [place] - [address] AT [facility] [address], [phone], [phone] [address], [facility]  [address]      Phone: [phone]   midodrine 5 MG Tabs         FU   Follow-up Information       Thuan Moody MD Follow up.    Specialty: Internal Medicine  Contact information:  [address] [facility]  [address] [place] [id number] [phone]                             DC instructions:      Discharge medications reviewed and reconciled.    Total Time Coordinating Care: Greater than 30 minutes    Patient had opportunity to ask questions and state understand and agree with therapeutic plan as outlined    Thank You,    DO Jade Roe Hospitalist  Pager

## 2024-04-21 NOTE — PLAN OF CARE
Assumed care of patient at 1930. Alert and oriented x1-2, No C/O chest pain or SOB, SPO2 on RA 95%, Heart rate A fib 60s. Breath sounds diminished. Bed is locked and in low position. Personal belonging within reach. Updated on plan of care and verbalized understanding. No concerns voiced or noted at this time.     Problem: Patient/Family Goals  Goal: Patient/Family Long Term Goal  Description: Patient's Long Term Goal: to go home    Interventions:  - tele monitoring, labs, EP consultation, increase activity, skin care    - See additional Care Plan goals for specific interventions  Outcome: Progressing  Goal: Patient/Family Short Term Goal  Description: Patient's Short Term Goal: feel better    Interventions:   - - tele monitoring, labs, EP consultation, increase activity, skin care    - See additional Care Plan goals for specific interventions  Outcome: Progressing     Problem: CARDIOVASCULAR - ADULT  Goal: Maintains optimal cardiac output and hemodynamic stability  Description: INTERVENTIONS:  - Monitor vital signs, rhythm, and trends  - Monitor for bleeding, hypotension and signs of decreased cardiac output  - Evaluate effectiveness of vasoactive medications to optimize hemodynamic stability  - Monitor arterial and/or venous puncture sites for bleeding and/or hematoma  - Assess quality of pulses, skin color and temperature  - Assess for signs of decreased coronary artery perfusion - ex. Angina  - Evaluate fluid balance, assess for edema, trend weights  Outcome: Progressing  Goal: Absence of cardiac arrhythmias or at baseline  Description: INTERVENTIONS:  - Continuous cardiac monitoring, monitor vital signs, obtain 12 lead EKG if indicated  - Evaluate effectiveness of antiarrhythmic and heart rate control medications as ordered  - Initiate emergency measures for life threatening arrhythmias  - Monitor electrolytes and administer replacement therapy as ordered  Outcome: Progressing     Problem: SKIN/TISSUE INTEGRITY  - ADULT  Goal: Incision(s), wounds(s) or drain site(s) healing without S/S of infection  Description: INTERVENTIONS:  - Assess and document risk factors for pressure ulcer development  - Assess and document skin integrity  - Assess and document dressing/incision, wound bed, drain sites and surrounding tissue  - Implement wound care per orders  - Initiate isolation precautions as appropriate  - Initiate Pressure Ulcer prevention bundle as indicated  Outcome: Progressing

## 2024-04-21 NOTE — PROGRESS NOTES
[facility]   part of [facility]    Cardiology Progress Note    Micheal Nickerson Patient Status:  Inpatient    1931 MRN [id number]   Location [facility] 2NE-A Attending Helder Machado, DO   Hosp Day # 4 PCP Thuan Moody MD       Impression/Plan:  89 year old male presenting with:     Recent admission for HfPEF exacerbation (EF 55-60%)  Syncope: recent episodes of pauses up to 4 seconds. Family did not want pacemaker at that time but is now amenable to PPM  Paroxysmal AFIB  HTN  CAD     - Orthostatics hypotension likely etiology of syncope.  Increased oral hydration encouraged.  Continue midodrine 5mg tid, increase as needed (will likely need to allow some extent of supine HTN to avoid significant orthostatic hypotension)  - Daily orthostatics  - Evaluated by EP, no clear indication for ppm at this time  - Cont eliquis for stroke prevention  - Cont to hold diuretic; monitor volume status closely  - Monitor on tele  - Will follow; per chart plan now for home hospice     Subjective: No events overnight.  Patient confused this am, denies chest pain, palpitations, dyspnea.    Patient Active Problem List   Diagnosis    S/P bilateral inguinal hernia repair [] & [] - CUONG Sosa*    H/O basal cell cancer - S. 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 Mirza    H/O [] umbilical hernia repair - CUONG Sosa*    Prostate cancer / Dx [] Rx Trelstar Injections - DIANE Hernandez (HCC)    Systolic click    PAC (premature atrial contraction)    Anemia, chronic disease    Borderline high cholesterol    Early cataracts, bilateral    Encounter for long-term (current) use of medications+    Diverticulosis of intestine, part unspecified, without perforation or  abscess without bleeding    Dysphagia, oral phase    Enterocolitis due to Clostridium difficile, not specified as recurrent    Muscle wasting and atrophy, not elsewhere classified, other site    Nonrheumatic mitral valve disorder, unspecified    Other shoulder lesions, left shoulder    Unspecified abnormalities of gait and mobility    Thrombocytopenia (HCC)    Anemia    Azotemia    Pneumonia due to COVID-19 virus    Hyperglycemia    Diarrhea    Diarrhea, unspecified type    Hypotension, unspecified hypotension type    Pleural effusion    Elevated brain natriuretic peptide (BNP) level    Syncope, near    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, right (HCC)    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)    Anasarca    Leukopenia, unspecified type    Candidiasis of genitalia    Atrial fibrillation, chronic (HCC)    Hydrocele, unspecified hydrocele type    Syncope and collapse    Bradycardia    Palliative care encounter    Counseling regarding advance care planning and goals of care       Objective:   Temp: 97.3 °F (36.3 °C)  Pulse: 79  Resp: 19  BP: 113/75    Intake/Output:     Intake/Output Summary (Last 24 hours) at 4/21/2024 0718  Last data filed at 4/21/2024 0401  Gross per 24 hour   Intake 840 ml   Output 550 ml   Net 290 ml       Last 3 Weights   04/19/24 0543 130 lb 1.1 oz (59 kg)   04/17/24 1159 136 lb 1.6 oz (61.7 kg)   04/17/24 0806 120 lb (54.4 kg)   04/16/24 0455 133 lb 2.5 oz (60.4 kg)   04/14/24 0445 146 lb 6.2 oz (66.4 kg)   04/13/24 0537 146 lb 4.8 oz (66.4 kg)   04/12/24 2246 148 lb 13 oz (67.5 kg)   04/12/24 1446 151 lb 14.4 oz (68.9 kg)   04/20/23 0700 152 lb (68.9 kg)       Tele: AF, PVCs    Physical Exam:    General: Confused  HEENT: Normocephalic, anicteric sclera, neck supple, no thyromegaly or adenopathy.  Neck: No JVD, carotids 2+, no bruits.  Cardiac: Irregularly irregular  Lungs: Clear without wheezes, rales, rhonchi or dullness.  Normal excursions and  effort.  Abdomen: Soft, non-tender. No organosplenomegally, mass or rebound, BS-present.  Extremities: Without clubbing, cyanosis or edema.  Peripheral pulses are 2+.  Neurologic: Confused  Skin: Warm and dry.     Laboratory/Data:    Labs:         Recent Labs   Lab 04/16/24  0543 04/17/24  0816 04/18/24  0637 04/19/24  0556 04/20/24  0536   WBC 3.5* 3.6* 3.2* 3.0* 3.2*   HGB 12.5* 12.4* 11.6* 12.0* 11.9*   MCV 97.4 101.0* 100.3* 97.6 99.5   .0* 142.0* 119.0* 129.0* 132.0*       Recent Labs   Lab 04/15/24  0422 04/16/24  0543 04/17/24  0816 04/18/24  0637 04/19/24  0556 04/20/24  0536    146* 143 144 144 146*   K 3.8  3.8 4.4  4.4 4.1 4.0 4.1 3.9    105 103 108 109 110   CO2 37.0* 36.0* 34.0* 33.0* 30.0 32.0   BUN 28* 31* 33* 36* 42* 40*   CREATSERUM 1.06 1.17 1.12 1.04 0.95 0.96   CA 8.2* 8.5 8.7 8.3* 8.7 8.6   MG 2.1  --  2.1  --   --   --    * 92 93 89 98 95       Recent Labs   Lab 04/17/24  0816   ALT 24   AST 18   ALB 2.9*       No results for input(s): \"TROP\" in the last 168 hours.    Allergies:   Allergies   Allergen Reactions    Contrast Dye [Gadolinium Derivatives]        Medications:  Current Facility-Administered Medications   Medication Dose Route Frequency    artificial saliva substitute (Biotene) oral solution   Oral PRN    midodrine (ProAmatine) tab 5 mg  5 mg Oral BID AC    acetaminophen (Tylenol Extra Strength) tab 500 mg  500 mg Oral Q4H PRN    polyethylene glycol (PEG 3350) (Miralax) 17 g oral packet 17 g  17 g Oral Daily PRN    sennosides (Senokot) tab 17.2 mg  17.2 mg Oral Nightly PRN    bisacodyl (Dulcolax) 10 MG rectal suppository 10 mg  10 mg Rectal Daily PRN    fleet enema (Fleet) 7-19 GM/118ML rectal enema 133 mL  1 enema Rectal Once PRN    ondansetron (Zofran) 4 MG/2ML injection 4 mg  4 mg Intravenous Q6H PRN    metoclopramide (Reglan) 5 mg/mL injection 5 mg  5 mg Intravenous Q8H PRN    terazosin (Hytrin) cap 10 mg  10 mg Oral Nightly    apixaban (Eliquis) tab  2.5 mg  2.5 mg Oral BID    montelukast (Singulair) tab 10 mg  10 mg Oral QPM       Kev Singh MD  4/21/2024  7:18 AM

## 2024-04-22 NOTE — PAYOR COMM NOTE
--------------  CONTINUED STAY REVIEW    Payor: BCBS MEDICARE ADV PPO  Subscriber #:  [id number]  Authorization Number: [id number]    Admit date: 4/17/24  Admit time: 11:47 AM    Admitting Physician: Peter Mark MD  Attending Physician:  No att. providers found  Primary Care Physician: Thuan Moody MD    REVIEW DOCUMENTATION:  4/20  Assessment and Plan:     89 year old male from home with a PMHx sig for afib, CAD,  HFpEF, prostate CA,  h/o cdiff, diverticulosis and HTN presents to the hospital with syncope and collapse.     Syncope and collapse  -Likely secondary to orthostatic hypotension  -EP consulted, does not rec PPM, suspects orthostatic hypotension is causing syncope  -ECHO from 4/11/24, EF 55-60  -Etiology of syncope is unclear, patient has been off of elda blocking agents, heart rate will have to be extremely low with pauses in order to have a profound syncopal episode   -Will obtain CT head in the setting of Eliquis use >> neg for acute process   -4/19 orthostatic vital signs positive with syncopal event, lasix held tomorrow per cards, compression stockings, abdominal binder and daily orthostatic vital signs   -Midodrine added  - Terazosin is an alpha-blocker can also precipitate hypotensive episodes.  Can DC if patient is able to urinate without issues.  - Recommend checking ambulatory BP at home     Bradycardia  Pauses  -monitor on tele for pauses  -EP eval as above     Afib  -rate controlled  -eliquis restarted     HFpEF  -no signs of vol overload upon physical exam  -lasix     BPH  -terazosin     Advance care planning  Family's tentative plan is to send the patient home with hospice.  Hospice meeting on 4/20/2024 at 1 PM.  Social work to assist     GOC: DNAR selective treatment     MA/ACO [place]  -Re- Entry: yes  -Consults: cards, EP  -Discharge Needs: TBD  -Appointments: PCP       FERMIN crowder in am  -diet-cardiac     Prophy  -SCD  -eliquis     Dispo  -pending clinical course and  hospice eval    Blood pressure 109/79, pulse 92, temperature 97.9 °F (36.6 °C), temperature source Oral, resp. rate 20, height 5' 2\" (1.575 m), weight 130 lb 1.1 oz (59 kg), SpO2 94%.     GENERAL: no apparent distress  NEURO: A/A Ox2-3  RESP: non labored, CTA  CARDIO: Regular, no murmur  ABD: soft, NT, ND, BS+  EXTREMITIES: no edema, no calf tenderness     DIAGNOSTIC DATA:   Labs:              Recent Labs   Lab 04/16/24  0543 04/17/24  0816 04/18/24  0637 04/19/24  0556 04/20/24  0536   WBC 3.5* 3.6* 3.2* 3.0* 3.2*   HGB 12.5* 12.4* 11.6* 12.0* 11.9*   MCV 97.4 101.0* 100.3* 97.6 99.5   .0* 142.0* 119.0* 129.0* 132.0*                   Recent Labs   Lab 04/14/24  0459 04/15/24  0422 04/16/24  0543 04/17/24  0816 04/18/24  0637 04/19/24  0556 04/20/24  0536    143 146* 143 144 144 146*   K 3.7 3.8  3.8 4.4  4.4 4.1 4.0 4.1 3.9    105 105 103 108 109 110   CO2 37.0* 37.0* 36.0* 34.0* 33.0* 30.0 32.0   BUN 23 28* 31* 33* 36* 42* 40*   CREATSERUM 1.06 1.06 1.17 1.12 1.04 0.95 0.96   CA 8.1* 8.2* 8.5 8.7 8.3* 8.7 8.6   MG 1.9 2.1  --  2.1  --   --   --    GLU 97 101* 92 93 89 98 95         MEDICATIONS ADMINISTERED IN LAST 1 DAY:  midodrine (ProAmatine) tab 5 mg       Date Action Dose Route User    Discharged on 4/21/2024 4/21/2024 1050 Given 5 mg Oral Nilda Diaz, RN            Vitals (last day) before discharge       Date/Time Temp Pulse Resp BP SpO2 Weight O2 Device O2 Flow Rate (L/min) [facility]    04/21/24 1029 -- -- -- -- 93 % -- -- --     04/21/24 0808 98 °F (36.7 °C) 76 20 137/89 96 % -- None (Room air) --     04/21/24 0330 97.3 °F (36.3 °C) 79 19 113/75 93 % -- Nasal cannula --     04/20/24 2230 97.7 °F (36.5 °C) 69 15 105/60 95 % -- -- --     04/20/24 2230 -- -- -- -- -- -- None (Room air) --     04/20/24 1930 -- 70 22 130/92 100 % -- None (Room air) --     04/20/24 1930 97.8 °F (36.6 °C) -- -- -- -- -- -- --     04/20/24 1716 97.4 °F (36.3 °C) 74 16 106/67 97 % -- None (Room  air) -- SH    04/20/24 1645 -- 81 22 -- -- -- -- -- AL    04/20/24 1222 97.9 °F (36.6 °C) 62 15 104/78 97 % -- None (Room air) -- AL    04/20/24 0741 -- 92 20 109/79 94 % -- None (Room air) -- ML    04/20/24 0741 97.9 °F (36.6 °C) -- -- -- -- -- -- -- AL    04/20/24 0738 -- 90 21 132/96 95 % -- None (Room air) -- ML    04/20/24 0734 -- 80 -- 160/98 94 % -- None (Room air) -- ML    04/20/24 0545 97.4 °F (36.3 °C) 60 18 137/86 -- -- Nasal cannula 2 L/min AM    04/20/24 0012 97.9 °F (36.6 °C) 62 19 113/72 94 % -- None (Room air) -- ML             --------------  DISCHARGE REVIEW    Payor: BCBS MEDICARE ADV PPO  Subscriber #:  [id number]  Authorization Number: [id number]    Admit date: 4/17/24  Admit time:  11:47 AM  Discharge Date: 4/21/2024 11:12 AM     Admitting Physician: Peter Mark MD  Attending Physician:  No att. providers found  Primary Care Physician: Thuan Moody MD          Discharge Summary Notes        Discharge Summary signed by Helder Machado DO at 4/21/2024  1:40 PM       Author: Helder Machado DO Specialty: HOSPITALIST Author Type: Physician    Filed: 4/21/2024  1:40 PM Date of Service: 4/21/2024  1:38 PM Status: Signed    : Helder Machado DO (Physician)           General Medicine Discharge Summary     Patient ID:  Micheal Nickerson  89 year old  12/30/1931    Admit date: 4/17/2024    Discharge date and time: 4/21/2024 11:12 AM     Attending Physician: No att. providers found     Consults: IP CONSULT TO CARDIOLOGY  IP CONSULT TO SOCIAL WORK  IP CONSULT PALLIATIVE CARE  IP CONSULT TO SOCIAL WORK  IP CONSULT TO SOCIAL WORK    Primary Care Physician: Thuan Moody MD     Reason for admission: syncope    Risk For Readmission: Low    Discharge Diagnoses: Syncope and collapse [R55]  Bradycardia [R00.1]  See Additional Discharge Diagnoses in Hospital Course    Discharged Condition: stable    Follow-up with labs/images appointments: Follow-up with PCP as  needed    Exam  Gen: No acute distress  Pulm: Lungs clear, normal respiratory effort  CV: Heart with regular rate and rhythm  Abd: Abdomen soft,       HPI:     Hospital Course:   89 year old male from home with a PMHx sig for afib, CAD,  HFpEF, prostate CA,  h/o cdiff, diverticulosis and HTN presents to the hospital with syncope and collapse.  Patient was seen and evaluated by cardiology.  Initially was concerning for bradycardia and pauses however further evaluation with EP suggestive of bradycardia being unlikely cause for his syncope.  Likely cause for his syncope was orthostatic hypotension.  His diuretics were discontinued.  He was started on midodrine 3 times daily.  Also recommend discontinuing doxazosin if further episodes continue to occur.  But it is continued at the time of discharge.  Family met with palliative care and decided to go home with hospice.  Social work assisted with the process.  Recommend follow-up with PCP for maintenance care as needed       Syncope and collapse  -Likely secondary to orthostatic hypotension  -EP consulted, does not rec PPM, suspects orthostatic hypotension is causing syncope  -ECHO from 4/11/24, EF 55-60  -Etiology of syncope is unclear, patient has been off of elda blocking agents, heart rate will have to be extremely low with pauses in order to have a profound syncopal episode   -Will obtain CT head in the setting of Eliquis use >> neg for acute process   -4/19 orthostatic vital signs positive with syncopal event, lasix held tomorrow per cards, compression stockings, abdominal binder and daily orthostatic vital signs   -Midodrine added  - Terazosin is an alpha-blocker can also precipitate hypotensive episodes.  Can DC if patient is able to urinate without issues.  - Recommend checking ambulatory BP at home     Bradycardia  Pauses  -monitor on tele for pauses  -EP eval as above     Afib  -rate controlled  -eliquis restarted     HFpEF  -no signs of vol overload upon  physical exam  -lasix     BPH  -terazosin        Operative Procedures:      Imaging: No results found.      Disposition: home    Activity: activity as tolerated  Diet: regular diet  Wound Care: none needed  Code Status: DNAR/Selective Treatment  O2: None    Home Medication Changes:     Med list     Medication List        START taking these medications      midodrine 5 MG Tabs  Commonly known as: ProAmatine  Take 1 tablet (5 mg total) by mouth in the morning and 1 tablet (5 mg total) at noon and 1 tablet (5 mg total) in the evening.            CONTINUE taking these medications      doxazosin 8 MG Tabs  Commonly known as: Cardura  TAKE 1 TABLET BY MOUTH DAILY 30 MINUTES AFTER DINNER     Eliquis 2.5 MG Tabs  Generic drug: apixaban  Take 1 tablet (2.5 mg total) by mouth 2 (two) times a day.     montelukast 10 MG Tabs  Commonly known as: Singulair  Take 1 tablet (10 mg total) by mouth every evening.            STOP taking these medications      furosemide 40 MG Tabs  Commonly known as: Lasix               Where to Get Your Medications        These medications were sent to [id number] - [place], [place] - [address], [phone], [phone]  [facility] [address]      Phone: [phone]   midodrine 5 MG Tabs            Follow-up Information       Thuan Moody MD Follow up.    Specialty: Internal Medicine  Contact information:  [address] [facility]  [address] [place] [address] [phone]                             DC instructions:      Discharge medications reviewed and reconciled.    Total Time Coordinating Care: Greater than 30 minutes    Patient had opportunity to ask questions and state understand and agree with therapeutic plan as outlined    Thank You,    Helder Machado DO    [facility]  Pager       Electronically signed by Helder Machado DO on 4/21/2024  1:40 PM         REVIEWER COMMENTS

## (undated) DEVICE — GEL AQUASONIC 100 20GR

## (undated) DEVICE — STERILE SYNTHETIC POLYISOPRENE POWDER-FREE SURGICAL GLOVES WITH HYDROGEL COATING, SMOOTH FINISH, STRAIGHT FINGER: Brand: PROTEXIS

## (undated) DEVICE — BLADE 24 SS SRG STRL

## (undated) DEVICE — PROXIMATE SKIN STAPLERS (35 WIDE) CONTAINS 35 STAINLESS STEEL STAPLES (FIXED HEAD): Brand: PROXIMATE

## (undated) DEVICE — MEDI-VAC SUCTION HANDLE REGULAR CAPACITY: Brand: CARDINAL HEALTH

## (undated) DEVICE — PAD SACRAL PREMIUM 12X12X1

## (undated) DEVICE — OCCLUSIVE GAUZE STRIP OVERWRAP,3% BISMUTH TRIBROMOPHENATE IN PETROLATUM BLEND: Brand: XEROFORM

## (undated) DEVICE — PREP BETADINE SOL 5% EYE

## (undated) DEVICE — MEGADYNE ELECTRODE ADULT PT RT

## (undated) DEVICE — SUT PROLENE 2-0 SH 8833H

## (undated) DEVICE — LAPAROTOMY SPONGE - RF AND X-RAY DETECTABLE PRE-WASHED: Brand: SITUATE

## (undated) DEVICE — MEGADYNE E-Z CLEAN BLADE 2.75"

## (undated) DEVICE — HEMOCLIP HORIZON MED 002200

## (undated) DEVICE — SHEET, DRAPE, SPLIT, STERILE: Brand: MEDLINE

## (undated) DEVICE — SUT CHROMIC GUT 4-0 RB-1 U203H

## (undated) DEVICE — SUT PROLENE 4-0 RB-1 8557H

## (undated) DEVICE — SUT MONOCRYL 4-0 PS-2 Y496G

## (undated) DEVICE — #10 STERILE DISPOSABLE SCALPEL: Brand: DISPOSABLE SCALPEL

## (undated) DEVICE — STERILE POLYISOPRENE POWDER-FREE SURGICAL GLOVES: Brand: PROTEXIS

## (undated) DEVICE — ELECTRODE EDGE PENCIL 10FT

## (undated) DEVICE — STANDARD HYPODERMIC NEEDLE,POLYPROPYLENE HUB: Brand: MONOJECT

## (undated) DEVICE — SUT VICRYL 3-0 SH J416H

## (undated) DEVICE — PROVE COVER: Brand: UNBRANDED

## (undated) DEVICE — SUT SILK 2-0 SH K833H

## (undated) DEVICE — NON-ADHERENT PAD PREPACK: Brand: TELFA

## (undated) DEVICE — ELECTRODE ESURG 2.75IN EZ CLN

## (undated) DEVICE — SOL NACL IRRIG 0.9% 1000ML BTL

## (undated) DEVICE — PEN SKIN MARKING REG TIP VIOLT

## (undated) DEVICE — SUT ETHIBOND 2-0 SH X833H

## (undated) DEVICE — 3M™ STERI-STRIP™ REINFORCED ADHESIVE SKIN CLOSURES, R1547, 1/2 IN X 4 IN (12 MM X 100 MM), 6 STRIPS/ENVELOPE: Brand: 3M™ STERI-STRIP™

## (undated) DEVICE — SYRINGE 10ML LL CONTRL SYRINGE

## (undated) DEVICE — SYRINGE 10ML LL TIP

## (undated) DEVICE — PREMIUM WET SKIN PREP TRAY: Brand: MEDLINE INDUSTRIES, INC.

## (undated) DEVICE — LAPAROTOMY CDS: Brand: MEDLINE INDUSTRIES, INC.

## (undated) DEVICE — SLEEVE KENDALL SCD EXPRESS MED

## (undated) DEVICE — UNDYED BRAIDED (POLYGLACTIN 910), SYNTHETIC ABSORBABLE SUTURE: Brand: COATED VICRYL

## (undated) DEVICE — BANDAGE,GAUZE,BULKEE II,4.5"X4.1YD,STRL: Brand: MEDLINE

## (undated) DEVICE — ATTAHJA VAC WITH 22MM CONNECTR

## (undated) DEVICE — LIGHT HANDLE

## (undated) DEVICE — TOWEL SURG OR 17X30IN BLUE

## (undated) DEVICE — SHEET,DRAPE,40X58,STERILE: Brand: MEDLINE

## (undated) NOTE — IP AVS SNAPSHOT
[address]            (For Outpatient Use Only) Initial Admit Date: 10/6/2019   Inpt/Obs Admit Date: Inpt: 10/7/19 / Obs: N/A   Discharge Date:    Tammy Roll:  [de-identified]   MRN: [de-identified]   CSN: [id number]   CEID: [id number] Payor:  Plan:    Group Number:  Insurance Type:    Subscriber Name:  Subscriber :    Subscriber ID:  Pt Rel to Subscriber:    Hospital Account Financial Class: Medicare    October 10, 2019

## (undated) NOTE — IP AVS SNAPSHOT
[address]            (For Outpatient Use Only) Initial Admit Date: 2022   Inpt/Obs Admit Date: Inpt: N/A / Obs: 22   Discharge Date:    Hospital Acct:  [de-identified]   MRN: [de-identified]   CSN: [id number]   CEID: [id number]        ENCOUNTER  Patient Class: Observation Admitting Provider: Jimmy Long MD Unit: [place] [address] Service: Cardiac Telemetry Attending Provider: Richi Slater DO   Bed: 2609-A   Visit Type:   Referring Physician: No ref. provider found Billing Flag:    Admit Diagnosis: Pleural effusion [J90]      PATIENT  Legal Name:   Kushal Forbes   Legal Sex: Male  Gender ID:              Pref Name:    PCP:  Isaura Miranda MD Home: [phone]   Address:  [place] [address] : 1931 (89 yrs) Mobile: [phone]         City/State/Zip: [facility] [address] Marital:  Language: [address] [place] [address] Drive: Will SSN4: [id number] Jew: Wishes Privacy     Race: White Ethnicity: Non  Or  O*   EMERGENCY CONTACT   Name Relationship Legal Guardian? Home Phone Work Phone Mobile Phone   1. Kathie Lr  2.  Keiko Villegas Daughter  Relative          [phone]     GUARANTOR  Guarantor: Yara Cyr : 1931 Home Phone: [phone]   Address: [place] [address]  Sex: Male Work Phone:    City/[facility]/Zip: [facility] [address]   Rel. to Patient: Self Guarantor ID: [id number]   GUARANTOR EMPLOYER   Employer:  Status: RETIRED     COVERAGE  PRIMARY INSURANCE   Payor: MEDICARE Plan: MEDICARE PART A&B   Group Number: [id number] Insurance Type: [address] Name: Fracisco Flores : 1931   Subscriber ID: [id number] Pt Rel to Subscriber: Self   SECONDARY INSURANCE   Payor: [place] PPO Plan: [facility]   Group Number: [id number] Insurance Type: [address] Name: Fracisco Flores : 1931   Subscriber ID: [id number] Pt Rel to Subscriber: [address] [place] [address] Payor:  Plan:    Group Number:  Insurance Type:    Subscriber Name:  Subscriber :    Subscriber ID:  Pt Rel to Subscriber:    Hospital Account Financial Class: Medicare    2022

## (undated) NOTE — LETTER
[facility], [address]  [place]  [address]  [facility]: [phone]  FAX: [phone]    Medical Clearance Request    MD [address] [place] [address]  Via Fax: [phone]    The patient listed below is scheduled for surgery and needs the following pre-op labs and/or clearance prior to surgery. The patient has been instructed to schedule an appointment with you for a pre-op physical.      Patient: Ti Rea  : 1931    Surgeon:  Dr. 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 Call [phone] with any questions. Thank you for your prompt attention to these requirements.     [facility]

## (undated) NOTE — IP AVS SNAPSHOT
Patient Demographics     Address  [address]  [facility] [address] Phone  [phone] (Home)  [phone] (Mobile) *Preferred* E-mail Address  [email or web address]      Patient Contacts     Name Relation Home Work Mobile    Kathie Lr Daughter   [phone]    Orlando Lr Relative   [phone]      Allergies as of 4/21/2024  Review status set to Review Complete on 4/17/2024       Noted Reaction Type Reactions    Contrast Dye [gadolinium Derivatives] 06/04/2010        DELETED: No Known Allergies 10/10/2019   Systemic UNKNOWN      Code Status Information     Code Status    DNAR/Selective Treatment      Patient Instructions    None      Follow-up Information     Thuan Moody MD Follow up.    Specialty: Internal Medicine  Contact information:  [address] [facility]  [address] [place] [address] [phone]                        Your Home Meds List      TAKE these medications       Instructions Authorizing Provider Morning Afternoon Evening As Needed   doxazosin 8 MG Tabs  Commonly known as: Cardura  Next dose due: Sun 4/21 evening      TAKE 1 TABLET BY MOUTH DAILY 30 MINUTES AFTER DINNER   Thuan Moody         Eliquis 2.5 MG Tabs  Generic drug: apixaban  Next dose due: Sun 4/21 bedtime      Take 1 tablet (2.5 mg total) by mouth 2 (two) times a day.   Gibson Alonso         midodrine 5 MG Tabs  Commonly known as: ProAmatine  Next dose due: Sun 4/21 evening      Take 1 tablet (5 mg total) by mouth in the morning and 1 tablet (5 mg total) at noon and 1 tablet (5 mg total) in the evening.  Stop taking on: May 21, 2024   Helder Machado         montelukast 10 MG Tabs  Commonly known as: Singulair  Next dose due: Sun 4/21 evening      Take 1 tablet (10 mg total) by mouth every evening.   Thuan Moody               Where to Get Your Medications      These medications were sent to [id number] - [place], [place] - [address] AT [facility]  [address], [phone], [phone]  [address] [facility] [address]    Phone: [phone]   midodrine 5 MG Tabs           [id number]-A - MAR ACTION REPORT  (last 48 hrs)    ** SITE UNKNOWN **     Order ID Medication Name Action Time Action Reason Comments    [id number] apixaban (Eliquis) tab 2.5 mg 04/19/24 2208 Given      [id number] apixaban (Eliquis) tab 2.5 mg 04/20/24 0824 Given      [id number] apixaban (Eliquis) tab 2.5 mg 04/20/24 2117 Given      [id number] apixaban (Eliquis) tab 2.5 mg 04/21/24 0830 Given      [id number] montelukast (Singulair) tab 10 mg 04/19/24 1708 Given      [id number] montelukast (Singulair) tab 10 mg 04/20/24 1714 Given      [id number] terazosin (Hytrin) cap 10 mg 04/19/24 2208 Given      [id number] terazosin (Hytrin) cap 10 mg 04/20/24 2117 Given              Recent Vital Signs    Flowsheet Row Most Recent Value   /89 Filed at 04/21/2024 0808   Pulse 76 Filed at 04/21/2024 0808   Resp 20 Filed at 04/21/2024 0808   Temp 98 °F (36.7 °C) Filed at 04/21/2024 0808   SpO2 93 % Filed at 04/21/2024 1029      Patient's Most Recent Weight    Flowsheet Row Most Recent Value   Patient Weight 59 kg (130 lb 1.1 oz)         Lab Results Last 24 Hours      CBC With Differential With Platelet [[id number]] (Abnormal)  Resulted: 04/21/24 0742, Result status: Final result   Ordering provider: Ron Garcia APRN  04/20/24 2300 Resulting lab: [facility] ([facility])   Narrative:  The following orders were created for panel order CBC With Differential With Platelet.  Procedure                               Abnormality         Status                     ---------                               -----------         ------                     CBC W/ DIFFERENTIAL[[id number]]          Abnormal            Final result                 Please view results for these tests on the individual orders.    Specimen Information    Type Source Collected On   Blood — 04/21/24 0664             Basic Metabolic Panel (8) [[id number]] (Abnormal)  Resulted: 24 0736, Result status: Final result   Ordering provider: Ron Garcia APRN  24 2300 Resulting lab: [facility]    Specimen Information    Type Source Collected On   Blood — 24 0655          Components    Component Value Reference Range Flag Lab   Glucose 94 70 - 99 mg/dL — [facility] ([facility])   Sodium 144 136 - 145 mmol/L — [facility])   Potassium 4.2 3.5 - 5.1 mmol/L — [facility])   Chloride 111 98 - 112 mmol/L — [facility] ([facility])   CO2 29.0 21.0 - 32.0 mmol/L — [facility])   Anion Gap 4 0 - 18 mmol/L — [facility] ([facility])   BUN 41 9 - 23 mg/dL H [facility] ([facility])   Creatinine 0.92 0.70 - 1.30 mg/dL — [facility] ([facility])   Calcium, Total 8.6 8.5 - 10.1 mg/dL — [facility] ([facility])   Calculated Osmolality 308 275 - 295 mOsm/kg H [facility] ([facility])   eGFR-Cr 78 >=60 mL/min/1.73m2 — [facility] ([facility])            Testing Performed By     Lab - Abbreviation Name Director Address Valid Date Range    139 - [facility] ([facility]) [facility] ([facility]) Goldberg, Cathryn A. MD [address] [place] [address] 20 1441 - Present            Microbiology Results (All)     None         H&P - H&P Note      H&P signed by Helder Machado DO at 2024  6:44 AM  Version 1 of 1    Author: Helder Machado DO Service: Hospitalist Author Type: Physician    Filed: 2024  6:44 AM Date of Service: 2024  1:55 PM Status: Signed    : Helder Machado DO (Physician)    Related Notes: Original Note by Ron Garcia APRN (Nurse Practitioner) filed at 2024  3:27 PM       [facility]   Hospitalist Team  [facility]   part of [facility]     History and Physical    Micheal Nickerson Patient Status:  Inpatient    1931 MRN [id number]   Location [facility] 2NE-A Attending Peter Mark MD   Hosp Day # 0 PCP Thuan Moody MD     Admit  Date:  4/17/2024    Is this a shared or split note between Advanced Practice Provider and Physician? Yes    ASSESSMENT / PLAN:   89 year old male from home with a PMHx sig for afib, CAD,  HFpEF, prostate CA,  h/o cdiff, diverticulosis and HTN presents to the hospital with syncope and collapse.    Syncope and collapse  -passed out while in kitchen with family, no injury reported as he was eased to the ground  -ECG w controlled rate AFIB   -troponin normal  -cardiology consulted, plan for EP consult, likely won't see the patient until Friday, no urgent need for PPM at this time, monitor on tele for pauses  -ECHO from 4/11/24, EF 55-60    Bradycardia  Pauses  -monitor on tele for pauses  -family was not interested in PPM last hospitalization, given new syncopal event they are now willing to consider  -EP to evaluate    Afib  -rate controlled  -eliquis,on hold starting tomorrow per cardiology, will give one dose tonight    HFpEF  -no signs of vol overload upon physical exam  -lasix    BPH  -terazosin    Voluntary GOC discussion with patient, POA/daughter Kathie confirms they have not made this decision yet.  They wanted him to decide on the last hospitalization but he was too confused.  They would like to discuss this as a family and are ok with a palliative care consult to help guide them with GOC.    MA/ACO Reach  -Re- Entry: yes  -Consults: cards, EP  -Discharge Needs: TBD  -Appointments: PCP      FERMIN crowder in am  -diet-cardiac    Prophy  -SCD    Dispo  -pending clinical course  PCP: Thuan Moody MD       Outpatient records or previous hospital records reviewed.   Further recommendations pending patient's clinical course.  Jade hospitalist  team to continue to follow patient while in house  Concerns regarding plan of care were discussed with patient. Patient agrees with plan as detailed above. Discussed plan of care with Dr. Machado    Note: This chart was prepared using voice recognition software and may  contain unintended word substitution errors.     Ron BEE  [facility] Hospitalist Team   Available via Perfect Serve or Bubble Chat (check Availability)    4/17/2024               HISTORY:   CC:   Chief Complaint   Patient presents with    Syncope        PCP: Thuan Moody MD    History of Present Illness: 89 year old male with a PMHx sig for afib, CAD, HFpEF, prostate CA,  h/o cdiff, diverticulosis and HTN presents to the hospital with syncope and collapse.  He was just discharged from the hospital and was experiencing bradycardia and <4 sec pauses on telemetry during that hospitalization.  The family was not interested in PPM insertion at this time.  He has a syncopal event at home while with family and was slowly lowered to the ground with no injury.  He woke up after a few seconds and returned to baseline.  He was brought back to [facility] for evaluation.  Cardiology was consulted and rec an EP consult who won't be able to see the patient until Friday.  Monitor on telemetry until EP can see.       Review of Systems  12 point systems reviewed, please see HPI for pertinent positives, otherwise negative    OBJECTIVE:  /79 (BP Location: Right arm)   Pulse 71   Temp (!) 96.3 °F (35.7 °C) (Axillary)   Resp 12   Ht 5' 2\" (1.575 m)   Wt 136 lb 1.6 oz (61.7 kg)   SpO2 96%   BMI 24.89 kg/m²     GENERAL: no apparent distress  NEUROLOGIC: A/A; Ox2-3: strength normal; sensations intact  RESPIRATORY: normal expansion; non labored, CTA   CARDIOVASCULAR: afib  ABDOMEN:  Soft, BS+; non distended, non tender    EXTREMITIES: no LE edema    PMH  Past Medical History:    ALLERGIC RHINITIS    Arrhythmia    a-fib    Back problem    CANCER    prostate, GL 8 (4+4) L base, GL 6 R base    Chronic rhinitis    Clostridioides difficile infection    treated, no longer symptomatic    Diverticulosis    Diverticulosis    Exposure to medical diagnostic radiation    2011    Heart attack (HCC)    daughter  denies ever having heart attack    Hematuria  PAINLESS    CYCTOSCOPY    IVP    High blood pressure    History of COVID-19    hospitalized x 4 days. cough/sob. 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 Hernandez    Spine surgery procedure unlisted      Vasectomy          ALL:  Allergies   Allergen Reactions    Contrast Dye [Gadolinium Derivatives]         Home Medications:  Outpatient Medications Marked as Taking for the 24 encounter (Hospital Encounter)   Medication Sig Dispense Refill    DOXAZOSIN 8 MG Oral Tab TAKE 1 TABLET BY MOUTH DAILY 30 MINUTES AFTER DINNER 90 tablet 1    montelukast 10 MG Oral Tab Take 1 tablet (10 mg total) by mouth every evening. 90 tablet 3    ELIQUIS 2.5 MG Oral Tab Take 1 tablet (2.5 mg total) by mouth 2 (two) times a day. 180 tablet 3       Soc Hx  Social History     Tobacco Use    Smoking status: Former     Types: Pipe     Quit date: 1964     Years since quittin.3    Smokeless tobacco: Never   Substance Use Topics    Alcohol use: No        Fam Hx  Family History   Problem Relation Age of Onset    Other ([other]) Mother     Cancer Father         esophageal    Heart Attack Brother                   DIAGNOSTIC DATA:   CBC/Chem  Recent Labs   Lab 24  0541 24  0459 04/15/24  0422 24  0543 24  0816   WBC 1.8* 3.1*  3.0* 3.5* 3.6*   HGB 11.2* 12.0* 12.2* 12.5* 12.4*   MCV 96.6 97.1 95.8 97.4 101.0*   .0* 122.0* 124.0* 137.0* 142.0*       Recent Labs   Lab 04/12/24  1455 04/13/24  0541 04/14/24  0459 04/15/24  0422 04/16/24  0543 04/17/24  0816   * 145 145 143 146* 143   K 3.8 3.6 3.7 3.8  3.8 4.4  4.4 4.1    112 106 105 105 103   CO2 29.0 29.0 37.0* 37.0* 36.0* 34.0*   BUN 27* 25* 23 28* 31* 33*   CREATSERUM 1.19 0.92 1.06 1.06 1.17 1.12   GLU 61* 71 97 101* 92 93   CA 8.4* 8.4* 8.1* 8.2* 8.5 8.7   MG 2.3  --  1.9 2.1  --  2.1       Recent Labs   Lab 04/11/24  0728 04/12/24  1455 04/14/24  0459 04/17/24  0816   ALT 34 33 30 24   AST 17 21 15 18   ALB 3.1* 2.9* 2.9* 2.9*       No results for input(s): \"TROP\" in the last 168 hours.      CXR: image personally reviewed     Radiology: XR CHEST AP PORTABLE  (CPT=71045)    Result Date: 4/17/2024  CONCLUSION:  1. Cardiomegaly with mild interstitial opacities likely representing mild edema. 2. Small left-sided pleural effusion with left basilar atelectasis/infiltrates.    LOCATION:  [facility]      Dictated by (CST): Camelia Figueroa MD on 4/17/2024 at 8:55 AM     Finalized by (CST): Camelia Figueroa MD on 4/17/2024 at 8:55 AM          SEE ATTENDING NOTE BELOW      Patient seen and examined independently.  Discussed with APN and agree with note above.      S: Seen and examined at bedside.  Patient laying in bed comfortably.  Answered all questions.  Family at bedside.    objective  BP 97/61 (BP Location: Right arm)   Pulse 65   Temp 96.8 °F (36 °C) (Axillary)   Resp 15   Ht 5' 2\" (1.575 m)   Wt 136 lb 1.6 oz (61.7 kg)   SpO2 98%   BMI 24.89 kg/m²     Gen: No acute distress, alert and oriented x to-3  Neck Supple, no JVD  Pulm: Lungs clear, normal respiratory effort, No wheezing or crackles  CV: Heart with regular rate and rhythm, No murmurs, rubs, gallops  Abd: Abdomen soft, nontender, nondistended, no organomegaly, bowel sounds present  MSK:  no clubbing, no  cyanosis.  No Lower extremity edema  Skin: no rashes or lesions, well perfused  Psych: mood stable, cooperative  Neuro: no focal deficits, alert oriented x 2-3 (at baseline), 5 out of 5 strength upper and lower extremities, able to answer questions appropriately and follow commands      Assessment and Plan    89 year old male from home with a PMHx sig for afib, CAD,  HFpEF, prostate CA,  h/o cdiff, diverticulosis and HTN presents to the hospital with syncope and collapse.    Syncope and collapse  Rule out spontaneous hemorrhagic bleed  Bradycardia  Pauses  Afib  HFpEF  BPH      - Telemetry  - Cardiology evaluation  - Etiology of syncope is unclear, patient has been off of elda blocking agents, heart rate will have to be extremely low with pauses in order to have a profound syncopal episode  - Will obtain CT head in the setting of Eliquis use  - Will hold Eliquis for now  - Discussed with family.  Answered all questions.      Rest as above with above    Helder Machado DO  [facility]        Electronically signed by Helder Machado DO on 2024  6:44 AM              Consults - MD Consult Notes      Consults signed by Everardo Juarez MD at 2024  8:04 AM     Author: Everardo Juarez MD Service: Cardiology Author Type: Physician    Filed: 2024  8:04 AM Date of Service: 2024  7:49 AM Status: Signed    : Everardo Juarez MD (Physician)       [facility] Electrophysiology Consult      Micheal Nickerson Patient Status:  Inpatient    1931 MRN [id number]   [facility] 2NE-A Attending Peter Mark MD   Hosp Day # 2 PCP MD Micheal Copeland is a 89 year old year old male    He has a h/o permanent atrial fibrillation, HFpEF, hypertension, CAD    Daughter in rooma nd on phone.  He was admitted with syncope. He had just stood up, didn't feel well for a minute or 2 and called out to his family, then eased to  the ground with LOC. Doesn't recall event.    Tele here: AF with controlled rates, some pauses up to 4 seconds while asleep    No previous syncope    Was just here last week with acute on chronic HFpEF    Denies chest pain, shortness of breath, palpitations, lightheadedness, orthopnea, paroxysmal nocturnal dyspnea, or edema.    ROS: All other systems were reviewed and were negative.    Past Medical History:  Past Medical History:    ALLERGIC RHINITIS    Arrhythmia    a-fib    Back problem    CANCER    prostate, GL 8 (4+4) L base, GL 6 R base    Chronic rhinitis    Clostridioides difficile infection    treated, no longer symptomatic    Diverticulosis    Diverticulosis    Exposure to medical diagnostic radiation    2011    Heart attack (HCC)    daughter denies ever having heart attack    Hematuria  PAINLESS    CYCTOSCOPY 1/93   IVP    High blood pressure    History of COVID-19    hospitalized x 4 days. cough/sob. 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    Unspecified essential hypertension    Visual impairment    glasses       Allergies:  Allergies   Allergen Reactions    Contrast Dye [Gadolinium Derivatives]        Outpatient Medications:  Current Facility-Administered Medications on File Prior to Encounter   Medication Dose Route Frequency Provider Last Rate Last Admin    [COMPLETED] potassium chloride (K-Dur) tab 40 mEq  40 mEq Oral Once Helder Machado DO   40 mEq at 04/15/24 0825    [COMPLETED] potassium chloride (Klor-Con) 20 MEQ oral powder 40 mEq  40 mEq Oral Once Avery Khan MD   40 mEq at 04/14/24 0819    [COMPLETED] furosemide (Lasix) 10 mg/mL injection 40 mg  40 mg Intravenous Once Jacob Hobbs DO   40 mg at 04/12/24 1853    [COMPLETED] nystatin  (Mycostatin) 100,000 Units/g ointment   Topical Once Jacob Hobbs DO   Given at 04/12/24 1956     Current Outpatient Medications on File Prior to Encounter   Medication Sig Dispense Refill    DOXAZOSIN 8 MG Oral Tab TAKE 1 TABLET BY MOUTH DAILY 30 MINUTES AFTER DINNER 90 tablet 1    montelukast 10 MG Oral Tab Take 1 tablet (10 mg total) by mouth every evening. 90 tablet 3    ELIQUIS 2.5 MG Oral Tab Take 1 tablet (2.5 mg total) by mouth 2 (two) times a day. 180 tablet 3    furosemide 40 MG Oral Tab Take 1 tablet (40 mg total) by mouth daily. 30 tablet 0        Scheduled Meds:   terazosin  10 mg Oral Nightly    [Held by provider] apixaban  2.5 mg Oral BID    furosemide  40 mg Oral Daily    montelukast  10 mg Oral QPM       Infusion Meds:      Social:   reports that he quit smoking about 60 years ago. His smoking use included pipe. He has never used smokeless tobacco. He reports that he does not drink alcohol and does not use drugs.    Family History:  family history includes Cancer in his father; Heart Attack in his brother.    Physical:  /83 (BP Location: Left arm)   Pulse 79   Temp 97.3 °F (36.3 °C) (Oral)   Resp 16   Ht 62\"   Wt 130 lb 1.1 oz (59 kg)   SpO2 91%   BMI 23.79 kg/m²      Intake/Output Summary (Last 24 hours) at 4/19/2024 0749  Last data filed at 4/19/2024 0500  Gross per 24 hour   Intake 240 ml   Output 1100 ml   Net -860 ml     Wt Readings from Last 4 Encounters:   04/19/24 130 lb 1.1 oz (59 kg)   04/16/24 133 lb 2.5 oz (60.4 kg)   04/20/23 152 lb (68.9 kg)   04/16/23 152 lb (68.9 kg)     GENERAL: sleeping arousable  EYES: sclera anicteric  HEENT: normocephalic  NECK: no JVD, no carotid bruits, no thyromegaly  RESPIRATORY: clear to auscultation  CARDIOVASCULAR: S1, S2 normal, IRRR; no S3, no S4; no click; no murmur  ABDOMEN: normal active BS, soft, nondistended; nontender  EXTREMITIES: no cyanosis, clubbing or edema, peripheral pulses intact  NEURO: no sensorimotor  deficits  PSYCHIATRIC: alert and oriented x 3, affect normal  SKIN: no rashes    Data:  ECG: AF 76  Tele: AF as above  Echo: EF normal    Labs:  Recent Labs   Lab 04/15/24  0422 04/16/24  0543 04/17/24  0816 04/18/24  0637 04/19/24  0556   WBC 3.0* 3.5* 3.6* 3.2* 3.0*   HGB 12.2* 12.5* 12.4* 11.6* 12.0*   .0* 137.0* 142.0* 119.0* 129.0*       Recent Labs   Lab 04/15/24  0422 04/16/24  0543 04/17/24  0816 04/18/24  0637 04/19/24  0556    146* 143 144 144   K 3.8  3.8 4.4  4.4 4.1 4.0 4.1    105 103 108 109   CO2 37.0* 36.0* 34.0* 33.0* 30.0   BUN 28* 31* 33* 36* 42*   CREATSERUM 1.06 1.17 1.12 1.04 0.95   CA 8.2* 8.5 8.7 8.3* 8.7   * 92 93 89 98       No results for input(s): \"INR\" in the last 168 hours.    No results for input(s): \"TROP\" in the last 168 hours.    TSH   Date Value Ref Range Status   04/11/2024 3.220 0.358 - 3.740 mIU/mL Final     Comment:     This test may exhibit interference when a sample is collected from a person who is consuming high dose of biotin (a.k.a., vitamin B7, vitamin H, coenzyme R) supplements resulting in serum concentrations >100 ng/mL.  Intake of the recommended daily allowance (RDA) for biotin (0.03 mg) has not been shown to typically cause significant interference; however, high dose daily dietary supplements may contain biotin concentrations greater than 150 times (5-10 mg) the RDA.  It is recommended that physicians ask all patients who may be on biotin supplementation to stop biotin consumption at least 72 hours prior to collection of a new sample.         Impression:  syncope  Permanent AF  HFpEF    Plan:  Etiology of syncope is unknown and unclear if a pacemaker would be helpful. Orthostatic hypotesnsion most likely cause. Long discussion with family about this. Questions answered.      Everardo Juarez MD  Cardiology / Clinical Cardiac Electrophysiology  [facility]      Electronically signed by Everardo Juarez MD on 4/19/2024  8:04  AM           D/C Summary    No notes of this type exist for this encounter.        Physical Therapy Notes (last 72 hours)      Physical Therapy Note signed by Millicent Hernadez PT at 4/21/2024  8:50 AM  Version 1 of 1    Author: Millicent Hernadez PT Service: Rehab Author Type: Physical Therapist    Filed: 4/21/2024  8:50 AM Date of Service: 4/21/2024  8:47 AM Status: Signed    : Millicent Hernadez PT (Physical Therapist)       Attempting PT treatment. Per chart review pt and family have elected hospice and plan for dc to home this am. Will sign off.        Physical Therapy Note signed by Taina Adrian PT at 4/18/2024  1:56 PM  Version 1 of 1    Author: Taina Adrian PT Service: Rehab Author Type: Physical Therapist    Filed: 4/18/2024  1:56 PM Date of Service: 4/18/2024 10:17 AM Status: Signed    : Taina Adrian PT (Physical Therapist)             PHYSICAL THERAPY EVALUATION - INPATIENT     Room Number: 2608/2608-A  Evaluation Date: 4/18/2024  Type of Evaluation: Initial  Physician Order: PT Eval and Treat    Presenting Problem: syncope and collapse  Co-Morbidities : afib, prostate cancer, HTN, covid, MI, back surgery, CHF  Reason for Therapy: Mobility Dysfunction and Discharge Planning    PHYSICAL THERAPY ASSESSMENT   Patient is currently functioning below baseline with bed mobility, transfers, and gait.  Prior to admission, patient's baseline is mod ind with RW.  Patient is requiring contact guard assist and minimal assist as a result of the following impairments: decreased functional strength, decreased endurance/aerobic capacity, decreased muscular endurance, and increased O2 needs from baseline.  Physical Therapy will continue to follow for duration of hospitalization.    Patient will benefit from continued skilled PT Services at discharge to promote functional independence and safety with additional support and return home with home health PT.    PLAN  PT Treatment Plan: Bed mobility;Endurance;Energy  conservation;Patient education;Family education;Gait training;Strengthening;Transfer training;Balance training  Rehab Potential : Good  Frequency (Obs): 3-5x/week  Number of Visits to Meet Established Goals: 4      CURRENT GOALS    Goal #1 Patient is able to demonstrate supine - sit EOB @ level: supervision     Goal #2 Patient is able to demonstrate transfers Sit to/from Stand at assistance level: supervision     Goal #3 Patient is able to ambulate 100 feet with assist device: walker - rolling at assistance level: supervision     Goal #4    Goal #5    Goal #6    Goal Comments: Goals established on 2024      PHYSICAL THERAPY MEDICAL/SOCIAL HISTORY  History related to current admission: Patient is a 89 year old male admitted on 2024 from home for syncope and collapse.  Pt diagnosed with afib, bradycardia.    Recent Admission:   -24 - CHF exacerbation, pleural effusion - DC w/C    HOME SITUATION  Type of Home: House   Home Layout: Two level;Able to live on main level                Lives With: Daughter;Family  Drives: No  Patient Owned Equipment: Rolling walker;Rollator  Patient Regularly Uses: Glasses    Prior Level of [place]: Pt is typically able to ambulate short distance with RW mod ind and is mod ind with ADLs.     SUBJECTIVE  \"All you young folk are great taking care of an old turkey like me\"       OBJECTIVE  Precautions: Bed/chair alarm;Hard of hearing  Fall Risk: High fall risk    WEIGHT BEARING RESTRICTION  Weight Bearing Restriction: None                PAIN ASSESSMENT  Ratin          COGNITION  Orientation Level:  oriented to place, oriented to person, disoriented to time, and disoriented to situation  Following Commands:  follows one step commands with increased time and follows one step commands with repetition  Awareness of Errors:  assistance required to identify errors made, assistance required to correct errors made, and decreased awareness of errors   Problem Solving:   assistance required to identify errors made, assistance required to generate solutions, and assistance required to implement solutions    RANGE OF MOTION AND STRENGTH ASSESSMENT  Upper extremity ROM and strength are within functional limits     Lower extremity ROM is within functional limits     Lower extremity strength is within functional limits; except mild deconditioning      BALANCE  Static Sitting: Good  Dynamic Sitting: Fair +  Static Standing: Poor +  Dynamic Standing: Poor +    ADDITIONAL TESTS                                    ACTIVITY TOLERANCE   BP supine: 160/91   BP sittin/106   BP standin/70       Pt denies dizziness. RN notified.           O2 WALK  Oxygen Therapy  SPO2% on Oxygen at Rest: 98  At rest oxygen flow (liters per minute): 2    NEUROLOGICAL FINDINGS                        AM-PAC '6-Clicks' INPATIENT SHORT FORM - BASIC MOBILITY  How much difficulty does the patient currently have...  Patient Difficulty: Turning over in bed (including adjusting bedclothes, sheets and blankets)?: A Little   Patient Difficulty: Sitting down on and standing up from a chair with arms (e.g., wheelchair, bedside commode, etc.): A Little   Patient Difficulty: Moving from lying on back to sitting on the side of the bed?: A Little   How much help from another person does the patient currently need...   Help from Another: Moving to and from a bed to a chair (including a wheelchair)?: A Little   Help from Another: Need to walk in hospital room?: A Little   Help from Another: Climbing 3-5 steps with a railing?: A Lot       AM-PAC Score:  Raw Score: 17   Approx Degree of Impairment: 50.57%   Standardized Score (AM-PAC Scale): 42.13   CMS Modifier (G-Code): CK    FUNCTIONAL ABILITY STATUS  Gait Assessment   Functional Mobility/Gait Assessment  Gait Assistance: Contact guard assist  Distance (ft): 3  Assistive Device: Rolling walker  Pattern: Shuffle (kyphotic pressure)    Skilled Therapy Provided: Per JADEN hester  to work with pt. Pt received in supine and was agreeable to PT session.     Bed Mobility:  Rolling: NT  Supine to sit: min A    Sit to supine: NT     Transfer Mobility:  Sit to stand: min A    Stand to sit: CGA  Gait = pt ambulated from bed to chair with RW and CGA    Therapist's Comments: Pt educated on role of therapy, goals for session, safety, fall prevention, and activity recommendations.     Exercise/Education Provided:  Bed mobility  Energy conservation  Functional activity tolerated  Gait training  Posture  Strengthening  Transfer training    Patient End of Session: Up in chair;Needs met;Call light within reach;RN aware of session/findings;All patient questions and concerns addressed;Alarm set;Family present;Discussed recommendations with /      Patient Evaluation Complexity Level:  History Moderate - 1 or 2 personal factors and/or co-morbidities   Examination of body systems Low - addressing 1-2 elements   Clinical Presentation Low - Stable   Clinical Decision Making Low - Stable       PT Session Time: 25 minutes  Therapeutic Activity: 8 minutes                      Occupational Therapy Notes (last 72 hours)      Occupational Therapy Note signed by Karen Mistry OT at 4/18/2024 12:33 PM  Version 1 of 1    Author: Karen Mistry OT Service: — Author Type: Occupational Therapist    Filed: 4/18/2024 12:33 PM Date of Service: 4/18/2024 12:15 PM Status: Signed    : Karen Mistry OT (Occupational Therapist)       OCCUPATIONAL THERAPY EVALUATION - INPATIENT     Room Number: 2608/2608-A  Evaluation Date: 4/18/2024  Type of Evaluation: Initial  Presenting Problem: syncope    Physician Order: IP Consult to Occupational Therapy  Reason for Therapy: ADL/IADL Dysfunction and Discharge Planning    OCCUPATIONAL THERAPY ASSESSMENT   Patient is currently functioning near baseline with toileting and dynamic standing balance. Prior to admission, patient's baseline is min A LB  dressing, family assistance sponge bathing, intermittent assistance with toileting needs, and supervision with RW.  Patient is requiring stand-by assist and contact guard assist as a result of the following impairments: cognitive deficits (baseline dementia). Occupational Therapy will continue to follow for duration of hospitalization.    ** As noted above, pt already has assistance with some ADL at home. Rehab potential is GUARDED. In the next session, OT will focus on patient and/or family education on  home safety and energy conservation principles.    Patient will benefit from continued skilled OT Services at discharge to promote functional independence and safety with additional support and return home with home health OT      History Related to Current Admission:  Pt was admitted from home on 4/17 after having a syncopal episode in the kitchen. Pt was assisted by the family member tp the ground.  Admitted for syncope, bradycardia, and A-fib.     Recent admission:  4/12 to 4/16/24 for pleural effusion and CHF-> home with home health    Co-Morbidities : afib, prostate cancer, HTN, covid, MI, back surgery, CHF    WEIGHT BEARING RESTRICTION  Weight Bearing Restriction: None                Recommendations for nursing staff:   Transfers: supervision  Toileting location: toilet    EVALUATION SESSION:  Patient Start of Session: supine  FUNCTIONAL TRANSFER ASSESSMENT  Sit to Stand: Edge of Bed  Edge of Bed: Supervision  Toilet Transfer: Supervision    BED MOBILITY  Supine to Sit : Independent  Sit to Supine (OT): Supervision    O2 SATURATIONS  Oxygen Therapy  SPO2% on Room Air at Rest: 96    COGNITION  Following Commands:  follows one step commands with repetition  Initiation: hand over hand to initiate tasks  Safety Judgement:  decreased awareness of need for assistance and decreased awareness of need for safety  Awareness of Deficits:  not aware of deficits  Problem Solving:  assistance required to identify errors  made, assistance required to generate solutions, and assistance required to implement solutions    Upper Extremity   ROM: within functional limits   Strength: within functional limits   Coordination  Gross motor:   Fine motor: intact    EDUCATION PROVIDED  Patient: Role of Occupational Therapy; Plan of Care; Functional Transfer Techniques; Fall Prevention  Patient's Response to Education: Returned Demonstration    Equipment used: rw  Demonstrates functional use,      Therapist comments: Pleasant. Pt's daughter was present during the session. Pt prefers to be called gradpa. Supine to sit independent level.  Seated BP 98/64, standing /60, no dizziness, HR 78-82.   Supervision to ambulate to bathroom, supervision toilet transfer stand to sit,and supervision sit to stand from toilet. 1 cueing provided to use grab bar for support. Static standing balance SBA while OT adjusted the gown.  SBA dynamic standing balance while pt stood by the sink to place an item from one side to the other side of the counter.  Supervision sit to supine. Alarm on.  Informed RN about the session.     Patient End of Session: In bed;Needs met;Call light within reach;With  staff;RN aware of session/findings;All patient questions and concerns addressed;Alarm set;Family present    OCCUPATIONAL PROFILE    HOME SITUATION  Type of Home: House  Home Layout: Two level;Able to live on main level  Lives With: Daughter;Family    Toilet and Equipment: Standard height toilet  Shower/Tub and Equipment: Other (Comment) (sponge bath)  Other Equipment:  (rw and rollator)          Drives: No  Patient Regularly Uses: Glasses    Prior Level of Function: per daughter, family assists with LB dressing, sponge bathing, and sometimes with toileting hygiene needs. Uses rw or rollator.  Supervision for safety.  Per OT note from 4/14/24. \" Pt's KARINA reports that pt's dementia is advanced at this point and that they have to repeat things very frequently and pt at  times does not recognize his own daughters. Pt uses a RW at home. Pt spends a lot of time in the bathroom (sometimes upwards of 3.5 hours due to frequently having the urge to use the bathroom) Pt's daughter and KARINA have converted their dining room into a bedroom for the pt in the 1st floor.\"    SUBJECTIVE   \"I can do that\" about toile transfer    PAIN ASSESSMENT  Ratin          OBJECTIVE  Precautions: Bed/chair alarm;Hard of hearing  Fall Risk: High fall risk      ASSESSMENTS    AM-PAC ‘6-Clicks’ Inpatient Daily Activity Short Form  -   Putting on and taking off regular lower body clothing?: A Little  -   Bathing (including washing, rinsing, drying)?: A Little  -   Toileting, which includes using toilet, bedpan or urinal? : A Little  -   Putting on and taking off regular upper body clothing?: None  -   Taking care of personal grooming such as brushing teeth?: None  -   Eating meals?: None    AM-PAC Score:  Score: 21  Approx Degree of Impairment: 32.79%  Standardized Score (AM-PAC Scale): 44.27    ADDITIONAL TESTS     NEUROLOGICAL FINDINGS      COGNITION ASSESSMENTS       PLAN  OT Treatment Plan: Energy conservation/work simplification techniques;Patient/Family education  Rehab Potential : Guarded  Frequency: 3x/week  Number of Visits to Meet Established Goals: 2    ADL Goals   Patient will perform toileting: with stand by assist      UE Exercise Program Goal  Patient will be supervision with bilateral AROM HEP (home exercise program).    Additional Goals  Pt and or family will incorporate 2 energy conservation techniques into ADL with 2 cueing.    Pt and or family will recall at least 3 home safety recommendation ideas.      Patient Evaluation Complexity Level:   Occupational Profile/Medical History LOW - Brief history including review of medical or therapy records    Specific performance deficits impacting engagement in ADL/IADL LOW  1 - 3 performance deficits    Client Assessment/Performance Deficits MODERATE  - Comorbidities and min to mod modifications of tasks    Clinical Decision Making LOW - Analysis of occupational profile, problem-focused assessments, limited treatment options    Overall Complexity LOW     OT Session Time:  17 minutes  Self-Care Home Management: 8 minutes  Therapeutic Activity: 4 minutes                                                     Video Swallow Study Notes    No notes of this type exist for this encounter.     SLP Notes    No notes of this type exist for this encounter.     Immunizations     Name Date      Covid-19 Pfizer 02/10/22     Covid-19 Pfizer 03/03/21     Covid-19 Pfizer 02/10/21     Fluad 0.5ml 10/21/17     INFLUENZA defer-01/12/22     Deferral: Contraindication     INFLUENZA 10/20/21     INFLUENZA 10/10/19     INFLUENZA 10/10/19     INFLUENZA 10/09/19     INFLUENZA 10/29/18     INFLUENZA 10/29/18     INFLUENZA 10/01/18     INFLUENZA 10/21/17     INFLUENZA 10/21/17     INFLUENZA 09/24/16     INFLUENZA 10/10/15     INFLUENZA 10/05/14     INFLUENZA 12/03/13     INFLUENZA 10/09/12     INFLUENZA 09/13/11     INFLUENZA 09/13/11     INFLUENZA 11/08/10     Pneumococcal (Prevnar 13) 01/17/18     Pneumococcal (Prevnar 13) 01/17/18     Pneumococcal (Prevnar 13) 01/16/18     Pneumococcal (Prevnar 13) 01/18/17     Pneumococcal (Prevnar 13) 01/18/17     Pneumovax 23 01/18/17     Tb Intradermal Test 10/11/19     Zoster Vaccine Live (Zostavax) 11/12/12       Multidisciplinary Problems     Active Goals     Not on file          Resolved Goals        Problem: Patient/Family Goals    Goal Priority Disciplines Outcome Interventions   Patient/Family Long Term Goal   (Resolved)     Interdisciplinary Adequate for Discharge    Description: Patient's Long Term Goal: to go home    Interventions:  - tele monitoring, labs, EP consultation, increase activity, skin care    - See additional Care Plan goals for specific interventions   Patient/Family Short Term Goal   (Resolved)     Interdisciplinary Adequate for  Discharge    Description: Patient's Short Term Goal: feel better    Interventions:   - - tele monitoring, labs, EP consultation, increase activity, skin care    - See additional Care Plan goals for specific interventions

## (undated) NOTE — IP AVS SNAPSHOT
Patient Demographics     Address  [address]   [place] [address] Phone  [phone] [facility]  [phone] [facility]      Emergency Contact(s)     Name Relation Home Work Mobile    Kathie Lr Daughter [phone]    Audie Sandoval Montelukast Sodium 10 MG Tabs  Commonly known as:  SINGULAIR      TAKE 1 TABLET BY MOUTH EVERY EVENING   Sis Madrigal MD         predniSONE 20 MG Tabs  Commonly known as:  Meryl Ilan  Start taking on:  10/11/2019      Take 1 tablet (20 mg total) by m Order ID Medication Name Action Time Action Reason Comments    [id number] Enoxaparin Sodium (LOVENOX) 40 MG/0.4ML injection 40 mg 10/09/19 2159 Given              Recent Vital Signs       Most Recent Value   Vitals  144/74 Filed at 10/10/2019 1145   Pulse FOLATE, YXW[[id number]]                                      Final result               HEMATOCRIT[[id number]]                   Abnormal            Final result                 Please view results for these tests on the individual orders.     Specimen Inform Strep Culture No Beta Hemolytic Strep Isolated    Rapid Strep A Screen () Once [[id number]]  (Normal) Collected:  10/07/19 1246    Order Status:  Completed Lab Status:  Final result Updated:  10/07/19 1305    Specimen:  Other from Throat      Rapid Stre has been clearing throat a lot but not complaining of pain[LM.3]. No noted respiratory compromise[LM. 4]    Rapid strep ordered  Steroid, pepcid, zyrtec ordered  Monitor closely  -not on ACEI[LM. 3]       [facility] Hospitalist H&P       CC:[LM.5] confusion, genera HYDROcodone-acetaminophen 5-325 MG Oral Tab Take 1 tablet by mouth every 6 (six) hours as needed for Pain.  Disp: 100 tablet Rfl: 0   Doxazosin Mesylate 8 MG Oral Tab TAKE 1 TABLET BY MOUTH DAILY 30 MINUTES AFTER DINNER Disp: 90 tablet Rfl: 3   Montelukast Psychiatric: No f[LM.5]acial droop or dysarthria[LM.7]  appropriate affect,  memory[LM.5] ok[LM. 7]     Diagnostic Data:    CBC/Chem  Recent Labs   Lab 10/06/19  1757 10/07/19  0534   WBC 9.0 7.3   HGB 12.4* 11.4*   MCV 96.9 98.3   .0 135.0*       Re D/w RN Taylor[LM.7]      previous hospital records reviewed. Further recommendations pending patient's clinical course.   [facility] hospitalist to continue to follow patient while in house    Patient and/or patient's family given opportunity to ask question Rapid strep ordered  Steroid, pepcid, zyrtec ordered  Monitor closely  -not on ACEI[LM. 2]       [facility] Hospitalist H&P       CC:[LM.4] confusion, general weakness[LM.5]    PCP: Caleb Najera MD    History of Present Illness:[LM.4] Pt is an 79 yo with Doxazosin Mesylate 8 MG Oral Tab TAKE 1 TABLET BY MOUTH DAILY 30 MINUTES AFTER DINNER Disp: 90 tablet Rfl: 3   Montelukast Sodium 10 MG Oral Tab TAKE 1 TABLET BY MOUTH EVERY EVENING Disp: 90 tablet Rfl: 3   VITAMIN C 500 MG OR TABS 1 TABLET DAILY Disp:  Rf 1757 10/07/19  0534   WBC 9.0 7.3   HGB 12.4* 11.4*   MCV 96.9 98.3   .0 135.0*       Recent Labs   Lab 10/06/19  1757 10/06/19  1926 10/07/19  0534     --  142   K  --  4.1 3.9     --  110   CO2 26.0  --  25.0   BUN 35*  --  25*   [facility] hospitalist to continue to follow patient while in house    Patient and/or patient's family given opportunity to ask questions and note understanding and agreeing with therapeutic plan as outlined    Thank Bijan Gordon MD  [facility] Hospitalist  A confusion and generalized weakness. Daughter at bedside aiding in history. Says pt has not been as sharp as usual- usu does word puzzles and has been unsteady on his feet with cough. Occurring for past few days. Family members with cough too.  Some pleurit Tobacco Use      Smoking status: Former Smoker        Types: Pipe        Quit date: 1964        Years since quittin.8      Smokeless tobacco: Never Used    Alcohol use: No       Fam Hx  Family History   Problem Relation Age of Onset   • Cancer F AST  --  31   ALB 3.5  --        No results for input(s): TROP in the last 168 hours.     Additional Diagnostics: ECG:[LM.3] HR 86[LM.5]    CXR: image personally reviewed[LM.3] agree with radiologist read[LM.5]    Radiology: Xr Chest Ap Portable  (cpt=[id number] LM.1 - Brenda Mclean MD on 10/7/2019  2:12 PM  LM. 2 - Brenda Mclean MD on 10/7/2019  2:13 PM  LM. 3 - Brenda Mclean MD on 10/7/2019  8:41 AM  LM. 4 - Brenda Mclean MD on 10/7/2019  9:02 AM  LM. 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 [LM.3]    **confusion Filed:  10/7/2019  9:10 AM Date of Service:  10/7/2019  8:41 AM Status:  Addendum    :  Taiwo Harris MD (Physician)    Related Notes:  Addendum by Taiwo Harris MD (Physician) filed at 10/7/2019  2:13 PM  Original Note by [facility] Medications Marked as Taking for the 10/6/19 encounter [facility] Encounter): HYDROcodone-acetaminophen 5-325 MG Oral Tab Take 1 tablet by mouth every 6 (six) hours as needed for Pain.  Disp: 100 tablet Rfl: 0   Doxazosin Mesylate 8 MG Oral Tab T Skin: Skin color, texture, turgor normal. No visible rashes     Neurologic:  Psychiatric: No f[LM.1]acial droop or dysarthria[LM.3]  appropriate affect,  memory[LM.1] ok[LM. 3]     Diagnostic Data:    CBC/Chem  Recent Labs   Lab 10/06/19  7876 10/07/19  054 **prostate cancer, allergic rhinitis-stable, no acute issue, f/u outpt    **PPx  -scds, iain    D/w RN Taylor[LM.3]      previous hospital records reviewed. Further recommendations pending patient's clinical course.   [facility] hospitalist to continue to started on empiric abx in ED. When seen, pt AxO x3-- daughter says pt is still not at baseline. No noted falls. [LM.2]      PMH  Past Medical History:   Diagnosis Date   • ALLERGIC RHINITIS    • Arrhythmia    • CANCER     prostate, GL 8 (4+4) L base, GL 6 Comprehensive ROS reviewed and negative except for what's stated above. Including negative for chest pain, shortness of breath, syncope.        OBJECTIVE:  /79 (BP Location: Right arm)   Pulse 79   Temp 98.1 °F (36.7 °C) (Oral)   Resp 18   Wt 165 lb CONCLUSION:  No acute intrathoracic process is identified. Minimal pleural fluid versus pleural scarring at the left lung base laterally. Otherwise no acute process.     Dictated by: Yaneli Saez MD on 10/06/2019 at 18:20     Approved by: Yaneli Saez, through 10/10/2019  3:16 PM)      Physical Therapy Note signed by Thomas Tran PT at 10/8/2019 10:35 AM  Version 1 of 1    Author:  Thomas Tran PT Service:  Rehab Author Type:  Physical Therapist    Filed:  10/8/2019 10:35 AM Date of Service:  10/ Prior Level of [place]: Pt reports ind PTA without use of AD for all mobility and ADLs. Dtr confirms. No AD at home. SUBJECTIVE  \"I want to make it to my 88th birthday! \"      Patient self-stated goal is to make it to his birthday.     OBJECT Gait Assistance: Minimum assistance  Distance (ft): 170x1;  50ftx1  Assistive Device: None;Rolling walker  Pattern: Shuffle          Skilled Therapy Provided: Pt received sitting eob after returning from bathroom with RN.   Pt demos sit to stand with CGA fi measures completed include AMPAC, modified rodriguez balance. On the Modified Rodriguez Balance Scale, in which a score below 23 is significant for fall risk, the Pt scored 4/28 indicating that the patient is at a high risk for falls.   .  Based on this evaluation, through 10/10/2019  3:16 PM)      Occupational Therapy Note signed by Carol Ann Cox OT at 10/9/2019  4:12 PM  Version 1 of 1    Author:  Carol Ann Cox OT Service:  Rehab Author Type:  Occupational Therapist    Filed:  10/9/2019  4:12 PM Date of S Toilet and Equipment: Standard height toilet  Shower/Tub and Equipment: Tub-shower combo;Grab bar; Shower chair  Other Equipment: None    Occupation/Status: retired  Hand Dominance: Right  Drives: No  Patient Regularly Uses: Glasses    Prior Level of [employer] in bed mobility, dressing, and functional transfers/mobility to facilitate IND with self care ADLs and functional mobility upon d/c. Pt completed bed mobility supine <> sit MOD I. Pt completed transfers sit <> stand SUPV from bed to arm chair with RW.  Pt c Therapy needs at this time. Patient discharged from Occupational Therapy services. Please re-order if a new functional limitation presents during this admission.     Patient was able to achieve the following goals:  Patient able to toilet transfer: at pre